# Patient Record
Sex: FEMALE | Race: WHITE | NOT HISPANIC OR LATINO | Employment: PART TIME | URBAN - METROPOLITAN AREA
[De-identification: names, ages, dates, MRNs, and addresses within clinical notes are randomized per-mention and may not be internally consistent; named-entity substitution may affect disease eponyms.]

---

## 2017-04-07 ENCOUNTER — OFFICE VISIT (OUTPATIENT)
Dept: URGENT CARE | Facility: URGENT CARE | Age: 57
End: 2017-04-07
Payer: COMMERCIAL

## 2017-04-07 VITALS
DIASTOLIC BLOOD PRESSURE: 94 MMHG | TEMPERATURE: 98.3 F | BODY MASS INDEX: 39.47 KG/M2 | OXYGEN SATURATION: 97 % | SYSTOLIC BLOOD PRESSURE: 138 MMHG | HEART RATE: 97 BPM | WEIGHT: 252 LBS

## 2017-04-07 DIAGNOSIS — J02.9 SORE THROAT: Primary | ICD-10-CM

## 2017-04-07 LAB
DEPRECATED S PYO AG THROAT QL EIA: NORMAL
FLUAV+FLUBV AG SPEC QL: NEGATIVE
FLUAV+FLUBV AG SPEC QL: NORMAL
MICRO REPORT STATUS: NORMAL
SPECIMEN SOURCE: NORMAL
SPECIMEN SOURCE: NORMAL

## 2017-04-07 PROCEDURE — 87081 CULTURE SCREEN ONLY: CPT | Performed by: FAMILY MEDICINE

## 2017-04-07 PROCEDURE — 87880 STREP A ASSAY W/OPTIC: CPT | Performed by: FAMILY MEDICINE

## 2017-04-07 PROCEDURE — 87804 INFLUENZA ASSAY W/OPTIC: CPT | Performed by: FAMILY MEDICINE

## 2017-04-07 PROCEDURE — 99203 OFFICE O/P NEW LOW 30 MIN: CPT | Performed by: PHYSICIAN ASSISTANT

## 2017-04-07 NOTE — MR AVS SNAPSHOT
After Visit Summary   4/7/2017    Maylin Ni    MRN: [id number]           Patient Information     Date Of Birth          1960        Visit Information        Provider Department      4/7/2017 7:20 PM Zachary Huerta PA-C [facility]        Today's Diagnoses     Sore throat    -  1      Care Instructions      Viral Upper Respiratory Illness (Adult)  You have a viral upper respiratory illness (URI), which is another term for the common cold. This illness is contagious during the first few days. It is spread through the air by coughing and sneezing. It may also be spread by direct contact (touching the sick person and then touching your own eyes, nose, or mouth). Frequent handwashing will decrease risk of spread. Most viral illnesses go away within 7 to 10 days with rest and simple home remedies. Sometimes the illness may last for several weeks. Antibiotics will not kill a virus, and they are generally not prescribed for this condition.    Home care    If symptoms are severe, rest at home for the first 2 to 3 days. When you resume activity, don't let yourself get too tired.    Avoid being exposed to cigarette smoke (yours or others ).    You may use acetaminophen or ibuprofen to control pain and fever, unless another medicine was prescribed. (Note: If you have chronic liver or kidney disease, have ever had a stomach ulcer or gastrointestinal bleeding, or are taking blood-thinning medicines, talk with your healthcare provider before using these medicines.) Aspirin should never be given to anyone under 18 years of age who is ill with a viral infection or fever. It may cause severe liver or brain damage.    Your appetite may be poor, so a light diet is fine. Avoid dehydration by drinking 6 to 8 glasses of fluids per day (water, soft drinks, juices, tea, or soup). Extra fluids will help loosen secretions in the nose and lungs.    Over-the-counter cold medicines will not  shorten the length of time you re sick, but they may be helpful for the following symptoms: cough, sore throat, and nasal and sinus congestion. (Note: Do not use decongestants if you have high blood pressure.)  Follow-up care  Follow up with your healthcare provider, or as advised.  When to seek medical advice  Call your healthcare provider right away if any of these occur:    Cough with lots of colored sputum (mucus)    Severe headache; face, neck, or ear pain    Difficulty swallowing due to throat pain    Fever of 100.4 F (38 C)  Call 911, or get immediate medical care  Call emergency services right away if any of these occur:    Chest pain, shortness of breath, wheezing, or difficulty breathing    Coughing up blood    Inability to swallow due to throat pain    2624-2243 [employer]. [address] [place] [address], [place] [id number]. All rights reserved. This information is not intended as a substitute for professional medical care. Always follow your healthcare professional's instructions.        Self-Care for Sore Throats  Sore throats occur for many reasons, such as colds, allergies, and infections caused by viruses or bacteria. In any case, your throat becomes red and sore. Your goal for self-care is to reduce your discomfort while giving your throat a chance to heal.    Moisten and Soothe Your Throat    Try a sip of water first thing after waking up.    Keep your throat moist by drinking 6 or more glasses of clear liquids every day.    Run a cool-air humidifier in your room overnight.    Avoid cigarette smoke.     Suck on throat lozenges, cough drops, hard candy, ice chips, or frozen fruit-juice bars. Use the sugar-free versions if your diet or medical condition require them.  Gargle to Ease Irritation  Gargling every hour or 2 can ease irritation. Try gargling with 1 of these solutions:    1/4 teaspoon of salt in 1/2 cup of warm water    An over-the-counter anesthetic gargle  Use Medication for More  Relief  Over-the-counter medication can reduce sore throat symptoms. Ask your pharmacist if you have questions about which medication to use:    Ease pain with anesthetic sprays. Aspirin or an aspirin substitute also helps. Remember, never give aspirin to anyone 18 or younger, or if you are already taking blood thinners.     For sore throats caused by allergies, try antihistamines to block the allergic reaction.    Remember: unless a sore throat is caused by a bacterial infection, antibiotics won t help you.  Prevent Future Sore Throats    Stop smoking or reduce contact with secondhand smoke. Smoke irritates the tender throat lining.    Limit contact with pets and with allergy-causing substances, such as pollen and mold.    When you re around someone with a sore throat or cold, wash your hands frequently to keep viruses or bacteria from spreading.    Don t strain your vocal cords.  Call Your Health Care Provider  Contact your doctor if you have:    A temperature over 101 F (38.3 C)    White spots on the throat    Great difficulty swallowing    Trouble breathing    A skin rash    Recent exposure to someone else with strep bacteria    Severe hoarseness and swollen glands in the neck or jaw     7061-6289 [employer]. [address] [place] [address]. All rights reserved. This information is not intended as a substitute for professional medical care. Always follow your healthcare professional's instructions.        Laryngitis    Laryngitis is a swelling of the tissues around the vocal cords. Symptoms include a hoarse (scratchy) voice. The voice may be lost completely. It may be caused by a viral illness, such as a head or chest cold. It may also be due to overuse and strain of the voice. Smoking, drinking alcohol, acid reflux, allergies, or inhaling harsh chemicals may also lead to symptoms. This condition will usually resolve in 1-2 weeks.  Home care    Rest your voice until it recovers. Talk as  little as possible. If your symptoms are severe, rest at home for a day or so.    Breathing cool steam from a humidifier/vaporizer or in a steamy shower may be helpful.    Drink plenty of fluids to stay well hydrated.    Do not smoke  Follow-up care  Follow up with your healthcare provider or this facility if you have not improved after one week.  When to seek medical advice  Contact your healthcare provider for any of the following:    Severe pain with swallowing    Trouble opening mouth    Neck swelling, neck pain, or trouble moving neck    Noisy breathing or trouble breathing    Fever of 100.4 F (38. C) or higher, or as directed by your healthcare provider    Drooling    Symptoms do not resolve in 2 weeks    7414-8905 [employer]. [address] [place] [address], [place] [address]. All rights reserved. This information is not intended as a substitute for professional medical care. Always follow your healthcare professional's instructions.              Follow-ups after your visit        Who to contact     If you have questions or need follow up information about today's clinic visit or your schedule please contact [facility] directly at [phone].  Normal or non-critical lab and imaging results will be communicated to you by [employer], letter or phone within 4 business days after the clinic has received the results. If you do not hear from us within 7 days, please contact the clinic through [employer] or phone. If you have a critical or abnormal lab result, we will notify you by phone as soon as possible.  Submit refill requests through [employer] or call your pharmacy and they will forward the refill request to us. Please allow 3 business days for your refill to be completed.          Additional Information About Your Visit        [employer] Information     [employer] lets you send messages to your doctor, view your test results, renew your prescriptions, schedule appointments and more. To sign up, go  "to [email or web address] . Click on \"Log in\" on the left side of the screen, which will take you to the Welcome page. Then click on \"Sign up Now\" on the right side of the page.     You will be asked to enter the access code listed below, as well as some personal information. Please follow the directions to create your username and password.     Your access code is: [email or web address]  Expires: 2017  8:16 PM     Your access code will  in 90 days. If you need help or a new code, please call your [place] clinic or [phone].        Care EveryWhere ID     This is your Care EveryWhere ID. This could be used by other organizations to access your [place] medical records  [id number]        Your Vitals Were     Pulse Temperature Pulse Oximetry BMI (Body Mass Index)          97 98.3  F (36.8  C) (Oral) 97% 39.47 kg/m2         Blood Pressure from Last 3 Encounters:   17 (!) 138/94   12 122/80    Weight from Last 3 Encounters:   17 252 lb (114.3 kg)   12 225 lb (102.1 kg)              We Performed the Following     Beta strep group A culture     Influenza A/B antigen     Strep, Rapid Screen        Primary Care Provider Office Phone # Fax #    Janeth Billingsley [phone]       [facility] [address] [place] [address] DR SARWAT GOMEZ [facility] [phone]        Thank you!     Thank you for choosing [facility]  for your care. Our goal is always to provide you with excellent care. Hearing back from our patients is one way we can continue to improve our services. Please take a few minutes to complete the written survey that you may receive in the mail after your visit with us. Thank you!             Your Updated Medication List - Protect others around you: Learn how to safely use, store and throw away your medicines at www.disposemymeds.org.          This list is accurate as of: 17  8:19 PM.  Always use your most recent med list.                   Brand Name Dispense " Instructions for use    IBUPROFEN          lisinopril 20 MG tablet    PRINIVIL/ZESTRIL     Take 20 mg by mouth daily.       lisinopril-hydrochlorothiazide 20-25 MG per tablet    PRINZIDE/ZESTORETIC     Take 1 tablet by mouth daily.

## 2017-04-08 NOTE — PROGRESS NOTES
SUBJECTIVE:   Maylin Ni is a 56 year old female presenting with a chief complaint of sore throat.  Onset of symptoms was 3 day(s) ago.  Course of illness is worsening.    Severity moderate  Current and Associated symptoms: rhinorrhea and cough   Treatment measures tried include None tried.  Predisposing factors include None.    No past medical history on file.  Current Outpatient Prescriptions   Medication Sig Dispense Refill     lisinopril-hydrochlorothiazide (PRINZIDE,ZESTORETIC) 20-25 MG per tablet Take 1 tablet by mouth daily.       lisinopril (PRINIVIL,ZESTRIL) 20 MG tablet Take 20 mg by mouth daily.       IBUPROFEN        Social History   Substance Use Topics     Smoking status: Never Smoker     Smokeless tobacco: Not on file     Alcohol use Not on file       ROS:  Review of systems negative except as stated above.    OBJECTIVE  :BP (!) 138/94  Pulse 97  Temp 98.3  F (36.8  C) (Oral)  Wt 252 lb (114.3 kg)  SpO2 97%  BMI 39.47 kg/m2  GENERAL APPEARANCE: healthy, alert and no distress  EYES: EOMI,  PERRL, conjunctiva clear  HENT: ear canals and TM's normal.  Nose and mouth without ulcers, erythema or lesions  NECK: supple, nontender, no lymphadenopathy  RESP: lungs clear to auscultation - no rales, rhonchi or wheezes  CV: regular rates and rhythm, normal S1 S2, no murmur noted    Results for orders placed or performed in visit on 04/07/17   Strep, Rapid Screen   Result Value Ref Range    Specimen Description Throat     Rapid Strep A Screen       NEGATIVE: No Group A streptococcal antigen detected by immunoassay, await   culture report.      Micro Report Status FINAL 04/07/2017    Influenza A/B antigen   Result Value Ref Range    Influenza A/B Agn Specimen Nasal     Influenza A Negative NEG    Influenza B  NEG     Negative   Test results must be correlated with clinical data. If necessary, results   should be confirmed by a molecular assay or viral culture.           ASSESSMENT:  (J02.9) Sore throat   (primary encounter diagnosis)  Comment: Likely viral URI, will follow up with worsening/persisting issues  Plan: Strep, Rapid Screen, Influenza A/B antigen,         Beta strep group A culture  Voice rest x2-3 days  Fluids, rest, supportive measures  Follow up with PCP if symptoms worsen or fail to improve

## 2017-04-08 NOTE — PATIENT INSTRUCTIONS
Viral Upper Respiratory Illness (Adult)  You have a viral upper respiratory illness (URI), which is another term for the common cold. This illness is contagious during the first few days. It is spread through the air by coughing and sneezing. It may also be spread by direct contact (touching the sick person and then touching your own eyes, nose, or mouth). Frequent handwashing will decrease risk of spread. Most viral illnesses go away within 7 to 10 days with rest and simple home remedies. Sometimes the illness may last for several weeks. Antibiotics will not kill a virus, and they are generally not prescribed for this condition.    Home care    If symptoms are severe, rest at home for the first 2 to 3 days. When you resume activity, don't let yourself get too tired.    Avoid being exposed to cigarette smoke (yours or others ).    You may use acetaminophen or ibuprofen to control pain and fever, unless another medicine was prescribed. (Note: If you have chronic liver or kidney disease, have ever had a stomach ulcer or gastrointestinal bleeding, or are taking blood-thinning medicines, talk with your healthcare provider before using these medicines.) Aspirin should never be given to anyone under 18 years of age who is ill with a viral infection or fever. It may cause severe liver or brain damage.    Your appetite may be poor, so a light diet is fine. Avoid dehydration by drinking 6 to 8 glasses of fluids per day (water, soft drinks, juices, tea, or soup). Extra fluids will help loosen secretions in the nose and lungs.    Over-the-counter cold medicines will not shorten the length of time you re sick, but they may be helpful for the following symptoms: cough, sore throat, and nasal and sinus congestion. (Note: Do not use decongestants if you have high blood pressure.)  Follow-up care  Follow up with your healthcare provider, or as advised.  When to seek medical advice  Call your healthcare provider right away if any  of these occur:    Cough with lots of colored sputum (mucus)    Severe headache; face, neck, or ear pain    Difficulty swallowing due to throat pain    Fever of 100.4 F (38 C)  Call 911, or get immediate medical care  Call emergency services right away if any of these occur:    Chest pain, shortness of breath, wheezing, or difficulty breathing    Coughing up blood    Inability to swallow due to throat pain    4502-7022 [employer]. [address] [place] [address], [place] [address]. All rights reserved. This information is not intended as a substitute for professional medical care. Always follow your healthcare professional's instructions.        Self-Care for Sore Throats  Sore throats occur for many reasons, such as colds, allergies, and infections caused by viruses or bacteria. In any case, your throat becomes red and sore. Your goal for self-care is to reduce your discomfort while giving your throat a chance to heal.    Moisten and Soothe Your Throat    Try a sip of water first thing after waking up.    Keep your throat moist by drinking 6 or more glasses of clear liquids every day.    Run a cool-air humidifier in your room overnight.    Avoid cigarette smoke.     Suck on throat lozenges, cough drops, hard candy, ice chips, or frozen fruit-juice bars. Use the sugar-free versions if your diet or medical condition require them.  Gargle to Ease Irritation  Gargling every hour or 2 can ease irritation. Try gargling with 1 of these solutions:    1/4 teaspoon of salt in 1/2 cup of warm water    An over-the-counter anesthetic gargle  Use Medication for More Relief  Over-the-counter medication can reduce sore throat symptoms. Ask your pharmacist if you have questions about which medication to use:    Ease pain with anesthetic sprays. Aspirin or an aspirin substitute also helps. Remember, never give aspirin to anyone 18 or younger, or if you are already taking blood thinners.     For sore throats caused by  allergies, try antihistamines to block the allergic reaction.    Remember: unless a sore throat is caused by a bacterial infection, antibiotics won t help you.  Prevent Future Sore Throats    Stop smoking or reduce contact with secondhand smoke. Smoke irritates the tender throat lining.    Limit contact with pets and with allergy-causing substances, such as pollen and mold.    When you re around someone with a sore throat or cold, wash your hands frequently to keep viruses or bacteria from spreading.    Don t strain your vocal cords.  Call Your Health Care Provider  Contact your doctor if you have:    A temperature over 101 F (38.3 C)    White spots on the throat    Great difficulty swallowing    Trouble breathing    A skin rash    Recent exposure to someone else with strep bacteria    Severe hoarseness and swollen glands in the neck or jaw     1147-4245 [employer]. [address] [place], [place] [address]. All rights reserved. This information is not intended as a substitute for professional medical care. Always follow your healthcare professional's instructions.        Laryngitis    Laryngitis is a swelling of the tissues around the vocal cords. Symptoms include a hoarse (scratchy) voice. The voice may be lost completely. It may be caused by a viral illness, such as a head or chest cold. It may also be due to overuse and strain of the voice. Smoking, drinking alcohol, acid reflux, allergies, or inhaling harsh chemicals may also lead to symptoms. This condition will usually resolve in 1-2 weeks.  Home care    Rest your voice until it recovers. Talk as little as possible. If your symptoms are severe, rest at home for a day or so.    Breathing cool steam from a humidifier/vaporizer or in a steamy shower may be helpful.    Drink plenty of fluids to stay well hydrated.    Do not smoke  Follow-up care  Follow up with your healthcare provider or this facility if you have not improved after one week.  When  to seek medical advice  Contact your healthcare provider for any of the following:    Severe pain with swallowing    Trouble opening mouth    Neck swelling, neck pain, or trouble moving neck    Noisy breathing or trouble breathing    Fever of 100.4 F (38. C) or higher, or as directed by your healthcare provider    Drooling    Symptoms do not resolve in 2 weeks    2198-1412 [employer]. [address] [place], [place] [address]. All rights reserved. This information is not intended as a substitute for professional medical care. Always follow your healthcare professional's instructions.

## 2017-04-08 NOTE — NURSING NOTE
"Chief Complaint   Patient presents with     Urgent Care     Pharyngitis     ST since Tuesday, cough/congestion X 2-3 days, settling into chest yesterday.     Initial BP (!) 138/94  Pulse 97  Temp 98.3  F (36.8  C) (Oral)  Wt 252 lb (114.3 kg)  SpO2 97%  BMI 39.47 kg/m2 Estimated body mass index is 39.47 kg/(m^2) as calculated from the following:    Height as of 5/29/12: 5' 7\" (1.702 m).    Weight as of this encounter: 252 lb (114.3 kg).  BP completed using cuff size  large    Trinity Guy/CMA    "

## 2017-04-09 LAB
BACTERIA SPEC CULT: NORMAL
MICRO REPORT STATUS: NORMAL
SPECIMEN SOURCE: NORMAL

## 2017-04-10 ENCOUNTER — OFFICE VISIT - HEALTHEAST (OUTPATIENT)
Dept: FAMILY MEDICINE | Facility: CLINIC | Age: 57
End: 2017-04-10

## 2017-04-10 DIAGNOSIS — J40 BRONCHITIS: ICD-10-CM

## 2017-04-10 DIAGNOSIS — I10 ESSENTIAL (PRIMARY) HYPERTENSION: ICD-10-CM

## 2017-04-10 LAB
CHOLEST SERPL-MCNC: 167 MG/DL
FASTING STATUS PATIENT QL REPORTED: YES
HDLC SERPL-MCNC: 52 MG/DL
LDLC SERPL CALC-MCNC: 100 MG/DL
TRIGL SERPL-MCNC: 75 MG/DL

## 2017-04-10 ASSESSMENT — MIFFLIN-ST. JEOR: SCORE: 1708.03

## 2017-05-23 ENCOUNTER — RECORDS - HEALTHEAST (OUTPATIENT)
Dept: ADMINISTRATIVE | Facility: OTHER | Age: 57
End: 2017-05-23

## 2017-11-05 ENCOUNTER — COMMUNICATION - HEALTHEAST (OUTPATIENT)
Dept: FAMILY MEDICINE | Facility: CLINIC | Age: 57
End: 2017-11-05

## 2017-11-05 DIAGNOSIS — I10 ESSENTIAL (PRIMARY) HYPERTENSION: ICD-10-CM

## 2017-12-29 ENCOUNTER — HOSPITAL ENCOUNTER (OUTPATIENT)
Dept: MAMMOGRAPHY | Facility: CLINIC | Age: 57
Discharge: HOME | End: 2017-12-29
Attending: FAMILY MEDICINE

## 2017-12-29 DIAGNOSIS — Z12.31 VISIT FOR SCREENING MAMMOGRAM: ICD-10-CM

## 2018-01-16 ENCOUNTER — OFFICE VISIT - HEALTHEAST (OUTPATIENT)
Dept: FAMILY MEDICINE | Facility: CLINIC | Age: 58
End: 2018-01-16

## 2018-01-16 DIAGNOSIS — I10 ESSENTIAL (PRIMARY) HYPERTENSION: ICD-10-CM

## 2018-01-16 DIAGNOSIS — Z00.00 ROUTINE GENERAL MEDICAL EXAMINATION AT A HEALTH CARE FACILITY: ICD-10-CM

## 2018-01-16 LAB
ALBUMIN SERPL-MCNC: 4 G/DL (ref 3.5–5)
ALBUMIN UR-MCNC: NEGATIVE MG/DL
ALP SERPL-CCNC: 68 U/L (ref 45–120)
ALT SERPL W P-5'-P-CCNC: 25 U/L (ref 0–45)
ANION GAP SERPL CALCULATED.3IONS-SCNC: 9 MMOL/L (ref 5–18)
APPEARANCE UR: CLEAR
AST SERPL W P-5'-P-CCNC: 22 U/L (ref 0–40)
BILIRUB SERPL-MCNC: 0.8 MG/DL (ref 0–1)
BILIRUB UR QL STRIP: NEGATIVE
BUN SERPL-MCNC: 12 MG/DL (ref 8–22)
CALCIUM SERPL-MCNC: 9.7 MG/DL (ref 8.5–10.5)
CHLORIDE BLD-SCNC: 102 MMOL/L (ref 98–107)
CHOLEST SERPL-MCNC: 171 MG/DL
CO2 SERPL-SCNC: 27 MMOL/L (ref 22–31)
COLOR UR AUTO: YELLOW
CREAT SERPL-MCNC: 0.7 MG/DL (ref 0.6–1.1)
FASTING STATUS PATIENT QL REPORTED: YES
GFR SERPL CREATININE-BSD FRML MDRD: >60 ML/MIN/1.73M2
GLUCOSE BLD-MCNC: 92 MG/DL (ref 70–125)
GLUCOSE UR STRIP-MCNC: NEGATIVE MG/DL
HDLC SERPL-MCNC: 58 MG/DL
HGB BLD-MCNC: 13.7 G/DL (ref 12–16)
HGB UR QL STRIP: ABNORMAL
KETONES UR STRIP-MCNC: NEGATIVE MG/DL
LDLC SERPL CALC-MCNC: 96 MG/DL
LEUKOCYTE ESTERASE UR QL STRIP: NEGATIVE
NITRATE UR QL: NEGATIVE
PH UR STRIP: 6.5 [PH] (ref 5–8)
POTASSIUM BLD-SCNC: 3.7 MMOL/L (ref 3.5–5)
PROT SERPL-MCNC: 6.9 G/DL (ref 6–8)
SODIUM SERPL-SCNC: 138 MMOL/L (ref 136–145)
SP GR UR STRIP: 1.01 (ref 1–1.03)
TRIGL SERPL-MCNC: 86 MG/DL
UROBILINOGEN UR STRIP-ACNC: ABNORMAL

## 2018-01-16 ASSESSMENT — MIFFLIN-ST. JEOR: SCORE: 1752.48

## 2018-07-27 ENCOUNTER — COMMUNICATION - HEALTHEAST (OUTPATIENT)
Dept: FAMILY MEDICINE | Facility: CLINIC | Age: 58
End: 2018-07-27

## 2018-07-27 DIAGNOSIS — I10 ESSENTIAL (PRIMARY) HYPERTENSION: ICD-10-CM

## 2018-10-06 ENCOUNTER — OFFICE VISIT (OUTPATIENT)
Dept: URGENT CARE | Facility: URGENT CARE | Age: 58
End: 2018-10-06
Payer: COMMERCIAL

## 2018-10-06 VITALS
DIASTOLIC BLOOD PRESSURE: 84 MMHG | WEIGHT: 256 LBS | HEART RATE: 104 BPM | SYSTOLIC BLOOD PRESSURE: 144 MMHG | BODY MASS INDEX: 40.1 KG/M2 | TEMPERATURE: 99.5 F | OXYGEN SATURATION: 97 % | RESPIRATION RATE: 12 BRPM

## 2018-10-06 DIAGNOSIS — J02.9 ACUTE PHARYNGITIS, UNSPECIFIED ETIOLOGY: ICD-10-CM

## 2018-10-06 DIAGNOSIS — J22 LOWER RESP. TRACT INFECTION: Primary | ICD-10-CM

## 2018-10-06 LAB
DEPRECATED S PYO AG THROAT QL EIA: NORMAL
SPECIMEN SOURCE: NORMAL

## 2018-10-06 PROCEDURE — 87081 CULTURE SCREEN ONLY: CPT | Performed by: PHYSICIAN ASSISTANT

## 2018-10-06 PROCEDURE — 87880 STREP A ASSAY W/OPTIC: CPT | Performed by: PHYSICIAN ASSISTANT

## 2018-10-06 PROCEDURE — 99213 OFFICE O/P EST LOW 20 MIN: CPT | Performed by: PHYSICIAN ASSISTANT

## 2018-10-06 RX ORDER — PREDNISONE 20 MG/1
40 TABLET ORAL DAILY
Qty: 10 TABLET | Refills: 0 | Status: SHIPPED | OUTPATIENT
Start: 2018-10-06 | End: 2018-10-11

## 2018-10-06 RX ORDER — ALBUTEROL SULFATE 90 UG/1
2 AEROSOL, METERED RESPIRATORY (INHALATION) EVERY 4 HOURS PRN
Qty: 1 INHALER | Refills: 0 | Status: SHIPPED | OUTPATIENT
Start: 2018-10-06 | End: 2022-05-31

## 2018-10-06 NOTE — PROGRESS NOTES
SUBJECTIVE:   Maylin Ni is a 58 year old female presenting with a chief complaint of ST, cough and cold sx that more in chest now.  Feels congested.  Coughing up green mucous.  Developed fever last night up to 101.4  Denies SOB or chest pain.  ST worse today and worried about strep  Onset of symptoms was 4 week(s) ago on and off but getting worse  Course of illness is worsening.    Severity moderate  Current and Associated symptoms: negative other than stated above  Treatment measures tried include Tylenol/Ibuprofen, Fluids and Rest.  Predisposing factors include None.    Generally healthy and no underlying medical issues    No past medical history on file.  Current Outpatient Prescriptions   Medication Sig Dispense Refill     albuterol (PROAIR HFA/PROVENTIL HFA/VENTOLIN HFA) 108 (90 Base) MCG/ACT inhaler Inhale 2 puffs into the lungs every 4 hours as needed for shortness of breath / dyspnea 1 Inhaler 0     amoxicillin-clavulanate (AUGMENTIN) 875-125 MG per tablet Take 1 tablet by mouth 2 times daily 20 tablet 0     IBUPROFEN        lisinopril (PRINIVIL,ZESTRIL) 20 MG tablet Take 20 mg by mouth daily.       lisinopril-hydrochlorothiazide (PRINZIDE,ZESTORETIC) 20-25 MG per tablet Take 1 tablet by mouth daily.       predniSONE (DELTASONE) 20 MG tablet Take 2 tablets (40 mg) by mouth daily for 5 days 10 tablet 0     Social History   Substance Use Topics     Smoking status: Never Smoker     Smokeless tobacco: Not on file     Alcohol use Not on file       ROS:  Review of systems negative except as stated above.    OBJECTIVE:  /84  Pulse 104  Temp 99.5  F (37.5  C) (Oral)  Resp 12  Wt 256 lb (116.1 kg)  SpO2 97%  BMI 40.1 kg/m2  GENERAL APPEARANCE: healthy, alert and no distress  EYES: EOMI,  PERRL, conjunctiva clear  HENT: TM's normal bilaterally, nasal turbinates erythematous, swollen, rhinorrhea yellow and green, oral mucous membranes moist, no erythema noted and thick green discharge  NECK: supple,  nontender, no lymphadenopathy  RESP: rhonchi throughout   CV: regular rates and rhythm, normal S1 S2, no murmur noted  NEURO: Normal strength and tone, sensory exam grossly normal,  normal speech and mentation  SKIN: no suspicious lesions or rashes    Results for orders placed or performed in visit on 10/06/18   Strep, Rapid Screen   Result Value Ref Range    Specimen Description Throat     Rapid Strep A Screen       NEGATIVE: No Group A streptococcal antigen detected by immunoassay, await culture report.   Beta strep group A culture   Result Value Ref Range    Specimen Description Throat     Culture Micro No beta hemolytic Streptococcus Group A isolated          assessment/plan:  (J22) Lower resp. tract infection  (primary encounter diagnosis)  Comment:   Plan: amoxicillin-clavulanate (AUGMENTIN) 875-125 MG         per tablet, predniSONE (DELTASONE) 20 MG         tablet, albuterol (PROAIR HFA/PROVENTIL         HFA/VENTOLIN HFA) 108 (90 Base) MCG/ACT inhaler          Med as directed and OTC med for sx relief.  Due to length of sx, exam and hx will treat.  Hold x-ray for now. Follow-up with PCP as needed if sx worsen and red flag signs discussed    (J02.9) Acute pharyngitis, unspecified etiology  Comment:   Plan: Strep, Rapid Screen, Beta strep group A culture          As above

## 2018-10-06 NOTE — NURSING NOTE
"Chief Complaint   Patient presents with     Urgent Care     Fever     Pt has been sick off and on over the 2-3 weeks.  Last night she developed a fever and wasnt feeling well.  This morning fever has increased and coughing, painful to swallow and throat feels full.  Coughing up green mucous.      Initial /84  Pulse 104  Temp 99.5  F (37.5  C) (Oral)  Resp 12  Wt 256 lb (116.1 kg)  SpO2 97%  BMI 40.1 kg/m2 Estimated body mass index is 40.1 kg/(m^2) as calculated from the following:    Height as of 5/29/12: 5' 7\" (1.702 m).    Weight as of this encounter: 256 lb (116.1 kg)..  BP completed using cuff size: rosalba Albarran R.N.    "

## 2018-10-07 LAB
BACTERIA SPEC CULT: NORMAL
SPECIMEN SOURCE: NORMAL

## 2018-10-13 ENCOUNTER — HEALTH MAINTENANCE LETTER (OUTPATIENT)
Age: 58
End: 2018-10-13

## 2018-12-11 ENCOUNTER — RECORDS - HEALTHEAST (OUTPATIENT)
Dept: ADMINISTRATIVE | Facility: OTHER | Age: 58
End: 2018-12-11

## 2019-01-11 ENCOUNTER — HOSPITAL ENCOUNTER (OUTPATIENT)
Dept: MAMMOGRAPHY | Facility: CLINIC | Age: 59
Discharge: HOME | End: 2019-01-11
Attending: FAMILY MEDICINE

## 2019-01-11 DIAGNOSIS — Z12.31 VISIT FOR SCREENING MAMMOGRAM: ICD-10-CM

## 2019-01-16 ENCOUNTER — COMMUNICATION - HEALTHEAST (OUTPATIENT)
Dept: FAMILY MEDICINE | Facility: CLINIC | Age: 59
End: 2019-01-16

## 2019-01-16 DIAGNOSIS — I10 ESSENTIAL (PRIMARY) HYPERTENSION: ICD-10-CM

## 2019-01-29 ENCOUNTER — OFFICE VISIT - HEALTHEAST (OUTPATIENT)
Dept: FAMILY MEDICINE | Facility: CLINIC | Age: 59
End: 2019-01-29

## 2019-01-29 DIAGNOSIS — Z00.00 ROUTINE GENERAL MEDICAL EXAMINATION AT A HEALTH CARE FACILITY: ICD-10-CM

## 2019-01-29 DIAGNOSIS — I10 ESSENTIAL (PRIMARY) HYPERTENSION: ICD-10-CM

## 2019-01-29 LAB
ALBUMIN SERPL-MCNC: 4.3 G/DL (ref 3.5–5)
ALBUMIN UR-MCNC: NEGATIVE MG/DL
ALP SERPL-CCNC: 70 U/L (ref 45–120)
ALT SERPL W P-5'-P-CCNC: 24 U/L (ref 0–45)
ANION GAP SERPL CALCULATED.3IONS-SCNC: 11 MMOL/L (ref 5–18)
APPEARANCE UR: CLEAR
AST SERPL W P-5'-P-CCNC: 24 U/L (ref 0–40)
BILIRUB SERPL-MCNC: 0.7 MG/DL (ref 0–1)
BILIRUB UR QL STRIP: NEGATIVE
BUN SERPL-MCNC: 9 MG/DL (ref 8–22)
CALCIUM SERPL-MCNC: 10 MG/DL (ref 8.5–10.5)
CHLORIDE BLD-SCNC: 101 MMOL/L (ref 98–107)
CHOLEST SERPL-MCNC: 164 MG/DL
CO2 SERPL-SCNC: 27 MMOL/L (ref 22–31)
COLOR UR AUTO: YELLOW
CREAT SERPL-MCNC: 0.73 MG/DL (ref 0.6–1.1)
FASTING STATUS PATIENT QL REPORTED: YES
GFR SERPL CREATININE-BSD FRML MDRD: >60 ML/MIN/1.73M2
GLUCOSE BLD-MCNC: 94 MG/DL (ref 70–125)
GLUCOSE UR STRIP-MCNC: NEGATIVE MG/DL
HDLC SERPL-MCNC: 54 MG/DL
HGB BLD-MCNC: 13.8 G/DL (ref 12–16)
HGB UR QL STRIP: ABNORMAL
KETONES UR STRIP-MCNC: NEGATIVE MG/DL
LDLC SERPL CALC-MCNC: 93 MG/DL
LEUKOCYTE ESTERASE UR QL STRIP: NEGATIVE
NITRATE UR QL: NEGATIVE
PH UR STRIP: 7.5 [PH] (ref 5–8)
POTASSIUM BLD-SCNC: 4 MMOL/L (ref 3.5–5)
PROT SERPL-MCNC: 7.1 G/DL (ref 6–8)
SODIUM SERPL-SCNC: 139 MMOL/L (ref 136–145)
SP GR UR STRIP: 1.01 (ref 1–1.03)
TRIGL SERPL-MCNC: 86 MG/DL
UROBILINOGEN UR STRIP-ACNC: ABNORMAL

## 2019-01-30 LAB
HPV SOURCE: NORMAL
HUMAN PAPILLOMA VIRUS 16 DNA: NEGATIVE
HUMAN PAPILLOMA VIRUS 18 DNA: NEGATIVE
HUMAN PAPILLOMA VIRUS FINAL DIAGNOSIS: NORMAL
HUMAN PAPILLOMA VIRUS OTHER HR: NEGATIVE
SPECIMEN DESCRIPTION: NORMAL

## 2019-02-06 LAB
BKR LAB AP ABNORMAL BLEEDING: NO
BKR LAB AP BIRTH CONTROL/HORMONES: NORMAL
BKR LAB AP CERVICAL APPEARANCE: NORMAL
BKR LAB AP GYN ADEQUACY: NORMAL
BKR LAB AP GYN INTERPRETATION: NORMAL
BKR LAB AP HPV REFLEX: NORMAL
BKR LAB AP LMP: NORMAL
BKR LAB AP PATIENT STATUS: NORMAL
BKR LAB AP PREVIOUS ABNORMAL: NO
BKR LAB AP PREVIOUS NORMAL: NORMAL
HIGH RISK?: NO
PATH REPORT.COMMENTS IMP SPEC: NORMAL
RESULT FLAG (HE HISTORICAL CONVERSION): NORMAL

## 2019-05-01 ENCOUNTER — COMMUNICATION - HEALTHEAST (OUTPATIENT)
Dept: FAMILY MEDICINE | Facility: CLINIC | Age: 59
End: 2019-05-01

## 2019-05-01 DIAGNOSIS — I10 ESSENTIAL (PRIMARY) HYPERTENSION: ICD-10-CM

## 2019-05-28 ENCOUNTER — COMMUNICATION - HEALTHEAST (OUTPATIENT)
Dept: FAMILY MEDICINE | Facility: CLINIC | Age: 59
End: 2019-05-28

## 2019-07-01 ENCOUNTER — COMMUNICATION - HEALTHEAST (OUTPATIENT)
Dept: FAMILY MEDICINE | Facility: CLINIC | Age: 59
End: 2019-07-01

## 2019-07-01 DIAGNOSIS — J01.90 ACUTE NON-RECURRENT SINUSITIS, UNSPECIFIED LOCATION: ICD-10-CM

## 2019-07-18 ENCOUNTER — RECORDS - HEALTHEAST (OUTPATIENT)
Dept: ADMINISTRATIVE | Facility: OTHER | Age: 59
End: 2019-07-18

## 2019-07-18 ENCOUNTER — COMMUNICATION - HEALTHEAST (OUTPATIENT)
Dept: FAMILY MEDICINE | Facility: CLINIC | Age: 59
End: 2019-07-18

## 2019-07-22 ENCOUNTER — OFFICE VISIT - HEALTHEAST (OUTPATIENT)
Dept: FAMILY MEDICINE | Facility: CLINIC | Age: 59
End: 2019-07-22

## 2019-07-22 DIAGNOSIS — M25.531 RIGHT WRIST PAIN: ICD-10-CM

## 2019-07-22 DIAGNOSIS — R82.90 ABNORMAL URINALYSIS: ICD-10-CM

## 2019-07-22 DIAGNOSIS — I10 ESSENTIAL (PRIMARY) HYPERTENSION: ICD-10-CM

## 2019-07-22 DIAGNOSIS — Z01.818 PREOPERATIVE EXAMINATION: ICD-10-CM

## 2019-07-22 LAB
ALBUMIN SERPL-MCNC: 4.2 G/DL (ref 3.5–5)
ALBUMIN UR-MCNC: NEGATIVE MG/DL
ALP SERPL-CCNC: 76 U/L (ref 45–120)
ALT SERPL W P-5'-P-CCNC: 28 U/L (ref 0–45)
ANION GAP SERPL CALCULATED.3IONS-SCNC: 10 MMOL/L (ref 5–18)
APPEARANCE UR: CLEAR
AST SERPL W P-5'-P-CCNC: 23 U/L (ref 0–40)
ATRIAL RATE - MUSE: 71 BPM
BACTERIA #/AREA URNS HPF: ABNORMAL HPF
BILIRUB SERPL-MCNC: 0.6 MG/DL (ref 0–1)
BILIRUB UR QL STRIP: NEGATIVE
BUN SERPL-MCNC: 11 MG/DL (ref 8–22)
CALCIUM SERPL-MCNC: 9.8 MG/DL (ref 8.5–10.5)
CHLORIDE BLD-SCNC: 102 MMOL/L (ref 98–107)
CHOLEST SERPL-MCNC: 165 MG/DL
CO2 SERPL-SCNC: 27 MMOL/L (ref 22–31)
COLOR UR AUTO: YELLOW
CREAT SERPL-MCNC: 0.67 MG/DL (ref 0.6–1.1)
DIASTOLIC BLOOD PRESSURE - MUSE: NORMAL MMHG
FASTING STATUS PATIENT QL REPORTED: YES
GFR SERPL CREATININE-BSD FRML MDRD: >60 ML/MIN/1.73M2
GLUCOSE BLD-MCNC: 84 MG/DL (ref 70–125)
GLUCOSE UR STRIP-MCNC: NEGATIVE MG/DL
HDLC SERPL-MCNC: 59 MG/DL
HGB BLD-MCNC: 13.5 G/DL (ref 12–16)
HGB UR QL STRIP: ABNORMAL
INTERPRETATION ECG - MUSE: NORMAL
KETONES UR STRIP-MCNC: NEGATIVE MG/DL
LDLC SERPL CALC-MCNC: 95 MG/DL
LEUKOCYTE ESTERASE UR QL STRIP: ABNORMAL
NITRATE UR QL: NEGATIVE
P AXIS - MUSE: 4 DEGREES
PH UR STRIP: 6.5 [PH] (ref 5–8)
POTASSIUM BLD-SCNC: 3.5 MMOL/L (ref 3.5–5)
PR INTERVAL - MUSE: 140 MS
PROT SERPL-MCNC: 6.6 G/DL (ref 6–8)
QRS DURATION - MUSE: 84 MS
QT - MUSE: 400 MS
QTC - MUSE: 434 MS
R AXIS - MUSE: -10 DEGREES
RBC #/AREA URNS AUTO: ABNORMAL HPF
SODIUM SERPL-SCNC: 139 MMOL/L (ref 136–145)
SP GR UR STRIP: 1.02 (ref 1–1.03)
SQUAMOUS #/AREA URNS AUTO: ABNORMAL LPF
SYSTOLIC BLOOD PRESSURE - MUSE: NORMAL MMHG
T AXIS - MUSE: 4 DEGREES
TRIGL SERPL-MCNC: 57 MG/DL
UROBILINOGEN UR STRIP-ACNC: ABNORMAL
VENTRICULAR RATE- MUSE: 71 BPM
WBC #/AREA URNS AUTO: ABNORMAL HPF

## 2019-07-22 ASSESSMENT — MIFFLIN-ST. JEOR: SCORE: 1579.66

## 2019-07-23 LAB — BACTERIA SPEC CULT: NO GROWTH

## 2019-08-08 ENCOUNTER — RECORDS - HEALTHEAST (OUTPATIENT)
Dept: ADMINISTRATIVE | Facility: OTHER | Age: 59
End: 2019-08-08

## 2019-09-10 ENCOUNTER — RECORDS - HEALTHEAST (OUTPATIENT)
Dept: ADMINISTRATIVE | Facility: OTHER | Age: 59
End: 2019-09-10

## 2019-12-16 ENCOUNTER — HEALTH MAINTENANCE LETTER (OUTPATIENT)
Age: 59
End: 2019-12-16

## 2020-01-06 ENCOUNTER — COMMUNICATION - HEALTHEAST (OUTPATIENT)
Dept: FAMILY MEDICINE | Facility: CLINIC | Age: 60
End: 2020-01-06

## 2020-01-06 DIAGNOSIS — I10 ESSENTIAL (PRIMARY) HYPERTENSION: ICD-10-CM

## 2020-01-31 ENCOUNTER — OFFICE VISIT - HEALTHEAST (OUTPATIENT)
Dept: FAMILY MEDICINE | Facility: CLINIC | Age: 60
End: 2020-01-31

## 2020-01-31 DIAGNOSIS — Z00.00 ROUTINE GENERAL MEDICAL EXAMINATION AT A HEALTH CARE FACILITY: ICD-10-CM

## 2020-01-31 DIAGNOSIS — I10 ESSENTIAL (PRIMARY) HYPERTENSION: ICD-10-CM

## 2020-01-31 DIAGNOSIS — Z12.11 SCREEN FOR COLON CANCER: ICD-10-CM

## 2020-01-31 LAB
ALBUMIN SERPL-MCNC: 4.2 G/DL (ref 3.5–5)
ALBUMIN UR-MCNC: NEGATIVE MG/DL
ALP SERPL-CCNC: 79 U/L (ref 45–120)
ALT SERPL W P-5'-P-CCNC: 19 U/L (ref 0–45)
ANION GAP SERPL CALCULATED.3IONS-SCNC: 14 MMOL/L (ref 5–18)
APPEARANCE UR: CLEAR
AST SERPL W P-5'-P-CCNC: 20 U/L (ref 0–40)
BILIRUB SERPL-MCNC: 0.6 MG/DL (ref 0–1)
BILIRUB UR QL STRIP: NEGATIVE
BUN SERPL-MCNC: 10 MG/DL (ref 8–22)
CALCIUM SERPL-MCNC: 9.7 MG/DL (ref 8.5–10.5)
CHLORIDE BLD-SCNC: 100 MMOL/L (ref 98–107)
CHOLEST SERPL-MCNC: 186 MG/DL
CO2 SERPL-SCNC: 24 MMOL/L (ref 22–31)
COLOR UR AUTO: YELLOW
CREAT SERPL-MCNC: 0.65 MG/DL (ref 0.6–1.1)
FASTING STATUS PATIENT QL REPORTED: NORMAL
GFR SERPL CREATININE-BSD FRML MDRD: >60 ML/MIN/1.73M2
GLUCOSE BLD-MCNC: 77 MG/DL (ref 70–125)
GLUCOSE UR STRIP-MCNC: NEGATIVE MG/DL
HDLC SERPL-MCNC: 66 MG/DL
HGB BLD-MCNC: 14.1 G/DL (ref 12–16)
HGB UR QL STRIP: NEGATIVE
KETONES UR STRIP-MCNC: NEGATIVE MG/DL
LDLC SERPL CALC-MCNC: 108 MG/DL
LEUKOCYTE ESTERASE UR QL STRIP: NEGATIVE
NITRATE UR QL: NEGATIVE
PH UR STRIP: 7 [PH] (ref 5–8)
POTASSIUM BLD-SCNC: 3.9 MMOL/L (ref 3.5–5)
PROT SERPL-MCNC: 6.8 G/DL (ref 6–8)
SODIUM SERPL-SCNC: 138 MMOL/L (ref 136–145)
SP GR UR STRIP: 1.02 (ref 1–1.03)
TRIGL SERPL-MCNC: 60 MG/DL
UROBILINOGEN UR STRIP-ACNC: NORMAL

## 2020-01-31 ASSESSMENT — MIFFLIN-ST. JEOR: SCORE: 1484.41

## 2020-02-17 ENCOUNTER — HOSPITAL ENCOUNTER (OUTPATIENT)
Dept: MAMMOGRAPHY | Facility: CLINIC | Age: 60
Discharge: HOME | End: 2020-02-17
Attending: FAMILY MEDICINE

## 2020-02-17 DIAGNOSIS — Z12.31 VISIT FOR SCREENING MAMMOGRAM: ICD-10-CM

## 2020-06-25 ENCOUNTER — OFFICE VISIT (OUTPATIENT)
Dept: OPTOMETRY | Facility: CLINIC | Age: 60
End: 2020-06-25
Payer: COMMERCIAL

## 2020-06-25 ENCOUNTER — RECORDS - HEALTHEAST (OUTPATIENT)
Dept: ADMINISTRATIVE | Facility: OTHER | Age: 60
End: 2020-06-25

## 2020-06-25 DIAGNOSIS — Z01.00 ENCOUNTER FOR EXAMINATION OF EYES AND VISION WITHOUT ABNORMAL FINDINGS: Primary | ICD-10-CM

## 2020-06-25 DIAGNOSIS — H52.223 REGULAR ASTIGMATISM OF BOTH EYES: ICD-10-CM

## 2020-06-25 DIAGNOSIS — H52.13 MYOPIA OF BOTH EYES: ICD-10-CM

## 2020-06-25 DIAGNOSIS — H52.4 PRESBYOPIA: ICD-10-CM

## 2020-06-25 PROCEDURE — 92004 COMPRE OPH EXAM NEW PT 1/>: CPT | Performed by: OPTOMETRIST

## 2020-06-25 PROCEDURE — 92015 DETERMINE REFRACTIVE STATE: CPT | Performed by: OPTOMETRIST

## 2020-06-25 ASSESSMENT — TONOMETRY
OS_IOP_MMHG: 16
OD_IOP_MMHG: 15
IOP_METHOD: APPLANATION

## 2020-06-25 ASSESSMENT — VISUAL ACUITY
OS_CC: 20/20
METHOD: SNELLEN - LINEAR
OD_CC: 20/20
OS_CC+: -2
CORRECTION_TYPE: GLASSES
OD_SC: 20/150
OD_CC: 20/30-1
OS_CC: 20/25
OD_CC+: -1
OS_SC: 20/100

## 2020-06-25 ASSESSMENT — REFRACTION_MANIFEST
OD_SPHERE: -1.00
OS_AXIS: 003
OS_CYLINDER: +0.50
OD_CYLINDER: SPHERE
OS_AXIS: 001
OD_ADD: +2.25
OS_SPHERE: -1.00
OS_SPHERE: -1.25
OS_ADD: +2.25
OD_CYLINDER: +0.50
OD_SPHERE: -1.25
METHOD_AUTOREFRACTION: 1
OD_AXIS: 180
OS_CYLINDER: +0.50

## 2020-06-25 ASSESSMENT — CONF VISUAL FIELD
OD_NORMAL: 1
OS_NORMAL: 1

## 2020-06-25 ASSESSMENT — EXTERNAL EXAM - RIGHT EYE: OD_EXAM: NORMAL

## 2020-06-25 ASSESSMENT — REFRACTION_WEARINGRX
OS_AXIS: 040
OD_SPHERE: -1.25
OD_ADD: +2.50
SPECS_TYPE: PAL
OS_ADD: +2.50
OS_CYLINDER: +0.25
OS_SPHERE: -1.25
OD_CYLINDER: SPHERE

## 2020-06-25 ASSESSMENT — SLIT LAMP EXAM - LIDS
COMMENTS: NORMAL
COMMENTS: NORMAL

## 2020-06-25 ASSESSMENT — EXTERNAL EXAM - LEFT EYE: OS_EXAM: NORMAL

## 2020-06-25 ASSESSMENT — CUP TO DISC RATIO
OS_RATIO: 0.25
OD_RATIO: 0.25

## 2020-06-25 NOTE — PATIENT INSTRUCTIONS
Maylin was advised of today's exam findings.  Optional to fill new glasses prescription, minimal change  Copy of glasses Rx provided today.    Return in 1-2 years for eye exam, or sooner if needed.    The effects of the dilating drops last for 4- 6 hours.  You will be more sensitive to light and vision will be blurry up close.  Mydriatic sunglasses were given if needed.    Leonard Chen O.D.  [facility]  [address] [place] [address] [place] [address] [phone]

## 2020-06-25 NOTE — PROGRESS NOTES
Chief Complaint   Patient presents with     Annual Eye Exam      Accompanied by self  Last Eye Exam: 3-  Dilated Previously: Yes    What are you currently using to see?  glasses       Distance Vision Acuity: Satisfied with vision    Near Vision Acuity: Not satisfied     Eye Comfort: good  Do you use eye drops? : No  Occupation or Hobbies: nurse manager at the     [employer]          Medical, surgical and family histories reviewed and updated 6/25/2020.       OBJECTIVE: See Ophthalmology exam    ASSESSMENT:    ICD-10-CM    1. Encounter for examination of eyes and vision without abnormal findings  Z01.00    2. Myopia of both eyes  H52.13    3. Regular astigmatism of both eyes  H52.223    4. Presbyopia  H52.4       PLAN:     Patient Instructions   Maylin was advised of today's exam findings.  Optional to fill new glasses prescription, minimal change  Copy of glasses Rx provided today.    Return in 1-2 years for eye exam, or sooner if needed.    The effects of the dilating drops last for 4- 6 hours.  You will be more sensitive to light and vision will be blurry up close.  Mydriatic sunglasses were given if needed.    Leonard Chen O.D.  [facility] [place]  [address] [address] [phone]

## 2020-06-25 NOTE — LETTER
6/25/2020         RE: Maylin Ni  [address] [place] [address]        Dear Colleague,    Thank you for referring your patient, Maylin Ni, to the [facility]. Please see a copy of my visit note below.    Chief Complaint   Patient presents with     Annual Eye Exam      Accompanied by self  Last Eye Exam: 3-  Dilated Previously: Yes    What are you currently using to see?  glasses       Distance Vision Acuity: Satisfied with vision    Near Vision Acuity: Not satisfied     Eye Comfort: good  Do you use eye drops? 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          Medical, surgical and family histories reviewed and updated 6/25/2020.       OBJECTIVE: See Ophthalmology exam    ASSESSMENT:    ICD-10-CM    1. Encounter for examination of eyes and vision without abnormal findings  Z01.00    2. Myopia of both eyes  H52.13    3. Regular astigmatism of both eyes  H52.223    4. Presbyopia  H52.4       PLAN:     Patient Instructions   Maylin was advised of today's exam findings.  Optional to fill new glasses prescription, minimal change  Copy of glasses Rx provided today.    Return in 1-2 years for eye exam, or sooner if needed.    The effects of the dilating drops last for 4- 6 hours.  You will be more sensitive to light and vision will be blurry up close.  Mydriatic sunglasses were given if needed.    Leonard Chen O.D.  [address] [place], [place] [id number] [phone]           Again, thank you for allowing me to participate in the care of your patient.        Sincerely,        Leonard Chen, OD

## 2020-07-31 ENCOUNTER — RECORDS - HEALTHEAST (OUTPATIENT)
Dept: ADMINISTRATIVE | Facility: OTHER | Age: 60
End: 2020-07-31

## 2020-08-10 ENCOUNTER — OFFICE VISIT - HEALTHEAST (OUTPATIENT)
Dept: FAMILY MEDICINE | Facility: CLINIC | Age: 60
End: 2020-08-10

## 2020-08-10 DIAGNOSIS — I10 ESSENTIAL (PRIMARY) HYPERTENSION: ICD-10-CM

## 2020-08-12 ENCOUNTER — COMMUNICATION - HEALTHEAST (OUTPATIENT)
Dept: FAMILY MEDICINE | Facility: CLINIC | Age: 60
End: 2020-08-12

## 2020-08-14 ENCOUNTER — AMBULATORY - HEALTHEAST (OUTPATIENT)
Dept: LAB | Facility: CLINIC | Age: 60
End: 2020-08-14

## 2020-08-14 ENCOUNTER — AMBULATORY - HEALTHEAST (OUTPATIENT)
Dept: NURSING | Facility: CLINIC | Age: 60
End: 2020-08-14

## 2020-08-14 DIAGNOSIS — I10 ESSENTIAL (PRIMARY) HYPERTENSION: ICD-10-CM

## 2020-08-14 LAB
ALBUMIN SERPL-MCNC: 3.9 G/DL (ref 3.5–5)
ALP SERPL-CCNC: 76 U/L (ref 45–120)
ALT SERPL W P-5'-P-CCNC: 19 U/L (ref 0–45)
ANION GAP SERPL CALCULATED.3IONS-SCNC: 8 MMOL/L (ref 5–18)
AST SERPL W P-5'-P-CCNC: 21 U/L (ref 0–40)
BILIRUB SERPL-MCNC: 0.6 MG/DL (ref 0–1)
BUN SERPL-MCNC: 14 MG/DL (ref 8–22)
CALCIUM SERPL-MCNC: 9.3 MG/DL (ref 8.5–10.5)
CHLORIDE BLD-SCNC: 102 MMOL/L (ref 98–107)
CHOLEST SERPL-MCNC: 183 MG/DL
CO2 SERPL-SCNC: 27 MMOL/L (ref 22–31)
CREAT SERPL-MCNC: 0.68 MG/DL (ref 0.6–1.1)
FASTING STATUS PATIENT QL REPORTED: YES
GFR SERPL CREATININE-BSD FRML MDRD: >60 ML/MIN/1.73M2
GLUCOSE BLD-MCNC: 88 MG/DL (ref 70–125)
HDLC SERPL-MCNC: 65 MG/DL
LDLC SERPL CALC-MCNC: 108 MG/DL
POTASSIUM BLD-SCNC: 3.8 MMOL/L (ref 3.5–5)
PROT SERPL-MCNC: 6.4 G/DL (ref 6–8)
SODIUM SERPL-SCNC: 137 MMOL/L (ref 136–145)
TRIGL SERPL-MCNC: 52 MG/DL

## 2020-10-04 ENCOUNTER — COMMUNICATION - HEALTHEAST (OUTPATIENT)
Dept: FAMILY MEDICINE | Facility: CLINIC | Age: 60
End: 2020-10-04

## 2021-03-10 ENCOUNTER — COMMUNICATION - HEALTHEAST (OUTPATIENT)
Dept: FAMILY MEDICINE | Facility: CLINIC | Age: 61
End: 2021-03-10

## 2021-03-24 ENCOUNTER — RECORDS - HEALTHEAST (OUTPATIENT)
Dept: FAMILY MEDICINE | Facility: CLINIC | Age: 61
End: 2021-03-24

## 2021-03-24 DIAGNOSIS — Z12.31 VISIT FOR SCREENING MAMMOGRAM: ICD-10-CM

## 2021-04-15 ENCOUNTER — DOCUMENTATION ONLY (OUTPATIENT)
Dept: LAB | Facility: CLINIC | Age: 61
End: 2021-04-15

## 2021-04-15 DIAGNOSIS — Z13.220 LIPID SCREENING: Primary | ICD-10-CM

## 2021-04-15 DIAGNOSIS — I10 ESSENTIAL HYPERTENSION: ICD-10-CM

## 2021-04-15 NOTE — PROGRESS NOTES
"Patient has lab only appt 4/23/21 for \"fasting labs\", no orders in chart.  She has a Px appt with you on 4/27/21.  Please place FUTURE orders in Epic if appropriate.    Thanks,   lab      ALSO:      Maylin Ni has an upcoming lab appointment:    Future Appointments   Date Time Provider Department Center   4/23/2021  8:20 AM EA LAB EALAB    4/23/2021  8:30 AM EAMA1 EAMAMM    4/27/2021  1:50 PM [facility]      Please review Health Maintenance and sign order: Review of Health Maintenance Protocol Orders (HMPO) to authorize patient's due Health Maintenance labs to be drawn.    Health Maintenance Due   Topic     ANNUAL REVIEW OF HM ORDERS      HIV SCREENING      HEPATITIS C SCREENING      LIPID      Yenifer Ramirez, MLT    "

## 2021-04-23 ENCOUNTER — ANCILLARY PROCEDURE (OUTPATIENT)
Dept: MAMMOGRAPHY | Facility: CLINIC | Age: 61
End: 2021-04-23
Payer: COMMERCIAL

## 2021-04-23 DIAGNOSIS — Z12.31 VISIT FOR SCREENING MAMMOGRAM: ICD-10-CM

## 2021-04-23 DIAGNOSIS — Z13.220 LIPID SCREENING: ICD-10-CM

## 2021-04-23 DIAGNOSIS — Z11.4 SCREENING FOR HIV (HUMAN IMMUNODEFICIENCY VIRUS): ICD-10-CM

## 2021-04-23 DIAGNOSIS — Z11.59 NEED FOR HEPATITIS C SCREENING TEST: ICD-10-CM

## 2021-04-23 DIAGNOSIS — I10 ESSENTIAL HYPERTENSION: ICD-10-CM

## 2021-04-23 LAB
ALBUMIN SERPL-MCNC: 3.8 G/DL (ref 3.4–5)
ALP SERPL-CCNC: 69 U/L (ref 40–150)
ALT SERPL W P-5'-P-CCNC: 33 U/L (ref 0–50)
ANION GAP SERPL CALCULATED.3IONS-SCNC: 6 MMOL/L (ref 3–14)
AST SERPL W P-5'-P-CCNC: 22 U/L (ref 0–45)
BILIRUB SERPL-MCNC: 0.6 MG/DL (ref 0.2–1.3)
BUN SERPL-MCNC: 13 MG/DL (ref 7–30)
CALCIUM SERPL-MCNC: 9.2 MG/DL (ref 8.5–10.1)
CHLORIDE SERPL-SCNC: 104 MMOL/L (ref 94–109)
CHOLEST SERPL-MCNC: 191 MG/DL
CO2 SERPL-SCNC: 28 MMOL/L (ref 20–32)
CREAT SERPL-MCNC: 0.67 MG/DL (ref 0.52–1.04)
GFR SERPL CREATININE-BSD FRML MDRD: >90 ML/MIN/{1.73_M2}
GLUCOSE SERPL-MCNC: 80 MG/DL (ref 70–99)
HCV AB SERPL QL IA: NONREACTIVE
HDLC SERPL-MCNC: 73 MG/DL
HIV 1+2 AB+HIV1 P24 AG SERPL QL IA: NONREACTIVE
LDLC SERPL CALC-MCNC: 103 MG/DL
NONHDLC SERPL-MCNC: 119 MG/DL
POTASSIUM SERPL-SCNC: 3.7 MMOL/L (ref 3.4–5.3)
PROT SERPL-MCNC: 7 G/DL (ref 6.8–8.8)
SODIUM SERPL-SCNC: 138 MMOL/L (ref 133–144)
TRIGL SERPL-MCNC: 77 MG/DL

## 2021-04-23 PROCEDURE — 86803 HEPATITIS C AB TEST: CPT | Performed by: FAMILY MEDICINE

## 2021-04-23 PROCEDURE — 36415 COLL VENOUS BLD VENIPUNCTURE: CPT | Performed by: FAMILY MEDICINE

## 2021-04-23 PROCEDURE — 80053 COMPREHEN METABOLIC PANEL: CPT | Performed by: FAMILY MEDICINE

## 2021-04-23 PROCEDURE — 80061 LIPID PANEL: CPT | Performed by: FAMILY MEDICINE

## 2021-04-23 PROCEDURE — 87389 HIV-1 AG W/HIV-1&-2 AB AG IA: CPT | Performed by: FAMILY MEDICINE

## 2021-04-23 PROCEDURE — 77067 SCR MAMMO BI INCL CAD: CPT | Mod: TC | Performed by: RADIOLOGY

## 2021-04-26 ASSESSMENT — ENCOUNTER SYMPTOMS
NAUSEA: 0
ABDOMINAL PAIN: 0
NERVOUS/ANXIOUS: 0
DYSURIA: 0
HEMATURIA: 0
CONSTIPATION: 0
MYALGIAS: 0
WEAKNESS: 0
CHILLS: 0
BREAST MASS: 0
COUGH: 0
ARTHRALGIAS: 0
DIARRHEA: 0
HEMATOCHEZIA: 0
PARESTHESIAS: 0
HEARTBURN: 0
SHORTNESS OF BREATH: 0
PALPITATIONS: 0
HEADACHES: 0
JOINT SWELLING: 0
FEVER: 0
SORE THROAT: 0
EYE PAIN: 0
FREQUENCY: 0
DIZZINESS: 0

## 2021-04-27 ENCOUNTER — OFFICE VISIT (OUTPATIENT)
Dept: PEDIATRICS | Facility: CLINIC | Age: 61
End: 2021-04-27
Payer: COMMERCIAL

## 2021-04-27 VITALS
SYSTOLIC BLOOD PRESSURE: 138 MMHG | DIASTOLIC BLOOD PRESSURE: 82 MMHG | HEART RATE: 62 BPM | WEIGHT: 225 LBS | BODY MASS INDEX: 36.16 KG/M2 | HEIGHT: 66 IN | RESPIRATION RATE: 18 BRPM | OXYGEN SATURATION: 97 % | TEMPERATURE: 98.2 F

## 2021-04-27 DIAGNOSIS — J45.20 MILD INTERMITTENT ASTHMA WITHOUT COMPLICATION: ICD-10-CM

## 2021-04-27 DIAGNOSIS — I10 ESSENTIAL HYPERTENSION: ICD-10-CM

## 2021-04-27 DIAGNOSIS — E66.01 MORBID OBESITY (H): ICD-10-CM

## 2021-04-27 DIAGNOSIS — Z00.00 ROUTINE GENERAL MEDICAL EXAMINATION AT A HEALTH CARE FACILITY: Primary | ICD-10-CM

## 2021-04-27 PROCEDURE — 99214 OFFICE O/P EST MOD 30 MIN: CPT | Mod: 25 | Performed by: INTERNAL MEDICINE

## 2021-04-27 PROCEDURE — 99396 PREV VISIT EST AGE 40-64: CPT | Performed by: INTERNAL MEDICINE

## 2021-04-27 RX ORDER — LISINOPRIL AND HYDROCHLOROTHIAZIDE 12.5; 2 MG/1; MG/1
2 TABLET ORAL DAILY
Qty: 180 TABLET | Refills: 3 | Status: SHIPPED | OUTPATIENT
Start: 2021-04-27 | End: 2022-04-28

## 2021-04-27 RX ORDER — LISINOPRIL AND HYDROCHLOROTHIAZIDE 12.5; 2 MG/1; MG/1
2 TABLET ORAL DAILY
Qty: 90 TABLET | Refills: 3 | Status: SHIPPED | OUTPATIENT
Start: 2021-04-27 | End: 2021-04-27

## 2021-04-27 ASSESSMENT — ENCOUNTER SYMPTOMS
PARESTHESIAS: 0
HEMATOCHEZIA: 0
DIARRHEA: 0
DYSURIA: 0
HEMATURIA: 0
FEVER: 0
NAUSEA: 0
WEAKNESS: 0
CHILLS: 0
BREAST MASS: 0
FREQUENCY: 0
PALPITATIONS: 0
EYE PAIN: 0
ARTHRALGIAS: 0
SORE THROAT: 0
NERVOUS/ANXIOUS: 0
HEARTBURN: 0
SHORTNESS OF BREATH: 0
ABDOMINAL PAIN: 0
DIZZINESS: 0
HEADACHES: 0
JOINT SWELLING: 0
MYALGIAS: 0
CONSTIPATION: 0
COUGH: 0

## 2021-04-27 ASSESSMENT — MIFFLIN-ST. JEOR: SCORE: 1594.4

## 2021-04-27 NOTE — PROGRESS NOTES
SUBJECTIVE:   CC: Maylin Ni is an 60 year old woman who presents for preventive health visit.     Patient has been advised of split billing requirements and indicates understanding: Yes     Healthy Habits:     Getting at least 3 servings of Calcium per day:  Yes    Bi-annual eye exam:  Yes    Dental care twice a year:  Yes    Sleep apnea or symptoms of sleep apnea:  None    Diet:  Regular (no restrictions)    Frequency of exercise:  6-7 days/week    Duration of exercise:  45-60 minutes    Taking medications regularly:  Yes    Medication side effects:  None    PHQ-2 Total Score: 0    Additional concerns today:  No    Last pap 1/29/2019 negative HPV - due every 5 years  Colonoscopy 7/31/2020 - Q5 years    The 10-year ASCVD risk score (Janessa MOYER JrLeila, et al., 2013) is: 4.6%    Values used to calculate the score:      Age: 60 years      Sex: Female      Is Non- : No      Diabetic: No      Tobacco smoker: No      Systolic Blood Pressure: 138 mmHg      Is BP treated: Yes      HDL Cholesterol: 73 mg/dL      Total Cholesterol: 191 mg/dL      Today's PHQ-2 Score:   PHQ-2 ( 1999 Pfizer) 4/26/2021   Q1: Little interest or pleasure in doing things 0   Q2: Feeling down, depressed or hopeless 0   PHQ-2 Score 0   Q1: Little interest or pleasure in doing things Not at all   Q2: Feeling down, depressed or hopeless Not at all   PHQ-2 Score 0     Abuse: Current or Past (Physical, Sexual or Emotional) - No  Do you feel safe in your environment? Yes    Have you ever done Advance Care Planning? (For example, a Health Directive, POLST, or a discussion with a medical provider or your loved ones about your wishes): No, advance care planning information given to patient to review.  Advanced care planning was discussed at today's visit.    Social History     Tobacco Use     Smoking status: Never Smoker     Smokeless tobacco: Never Used   Substance Use Topics     Alcohol use: Yes     If you drink alcohol do you  typically have >3 drinks per day or >7 drinks per week? No    Alcohol Use 4/27/2021   Prescreen: >3 drinks/day or >7 drinks/week? -   Prescreen: >3 drinks/day or >7 drinks/week? No     Reviewed orders with patient.  Reviewed health maintenance and updated orders accordingly - Yes      Breast Cancer Screening:    FSH-7:   Breast CA Risk Assessment (FHS-7) 4/26/2021   Did any of your first-degree relatives have breast or ovarian cancer? No   Did any of your relatives have bilateral breast cancer? Yes   Did any man in your family have breast cancer? No   Did any woman in your family have breast and ovarian cancer? No   Did any woman in your family have breast cancer before age 50 y? No   Do you have 2 or more relatives with breast and/or ovarian cancer? No   Do you have 2 or more relatives with breast and/or bowel cancer? Yes     Mammogram Screening: Recommended mammography every 1-2 years with patient discussion and risk factor consideration  Pertinent mammograms are reviewed under the imaging tab.    History of abnormal Pap smear: NO - age 30-65 PAP every 5 years with negative HPV co-testing recommended     Reviewed and updated as needed this visit by clinical staff  Tobacco  Allergies               Reviewed and updated as needed this visit by Provider                    Review of Systems   Constitutional: Negative for chills and fever.   HENT: Negative for congestion, ear pain, hearing loss and sore throat.    Eyes: Negative for pain and visual disturbance.   Respiratory: Negative for cough and shortness of breath.    Cardiovascular: Negative for chest pain, palpitations and peripheral edema.   Gastrointestinal: Negative for abdominal pain, constipation, diarrhea, heartburn, hematochezia and nausea.   Breasts:  Negative for tenderness, breast mass and discharge.   Genitourinary: Negative for dysuria, frequency, genital sores, hematuria, pelvic pain, urgency, vaginal bleeding and vaginal discharge.  "  Musculoskeletal: Negative for arthralgias, joint swelling and myalgias.   Skin: Negative for rash.   Neurological: Negative for dizziness, weakness, headaches and paresthesias.   Psychiatric/Behavioral: Negative for mood changes. The patient is not nervous/anxious.           OBJECTIVE:   /82 (BP Location: Right arm, Patient Position: Sitting, Cuff Size: Adult Regular)   Pulse 62   Temp 98.2  F (36.8  C) (Tympanic)   Resp 18   Ht 1.664 m (5' 5.5\")   Wt 102.1 kg (225 lb)   SpO2 97%   BMI 36.87 kg/m    Physical Exam  GENERAL: healthy, alert and no distress  EYES: Eyes grossly normal to inspection, PERRL and conjunctivae and sclerae normal  HENT: ear canals and TM's normal, nose and mouth without ulcers or lesions  NECK: no adenopathy, no asymmetry, masses, or scars and thyroid normal to palpation  RESP: lungs clear to auscultation - no rales, rhonchi or wheezes  CV: regular rate and rhythm, normal S1 S2, no S3 or S4, no murmur, click or rub, no peripheral edema and peripheral pulses strong  ABDOMEN: soft, nontender, no hepatosplenomegaly, no masses and bowel sounds normal  MS: no gross musculoskeletal defects noted, no edema  SKIN: no suspicious lesions or rashes  NEURO: Normal strength and tone, mentation intact and speech normal  PSYCH: mentation appears normal, affect normal/bright    Diagnostic Test Results:  Labs reviewed in Epic    ASSESSMENT/PLAN:   1. Routine general medical examination at a health care facility  61 yo here for annual preventive health physical.  Reviewed healthy BP and BMI ranges.  Counseled on lifestyle modifications for optimal mental and physical health.  Discussed age-appropriate health maintanence.  Additional concerns addressed.      2. Essential hypertension  - Stable, no side effects with medications, refilled meds today  - lisinopril-hydrochlorothiazide (ZESTORETIC) 20-12.5 MG tablet; Take 2 tablets by mouth daily  Dispense: 180 tablet; Refill: 3    3. Mild intermittent " "asthma without complication  - Stable, no side effects with medications, refilled meds today        Patient has been advised of split billing requirements and indicates understanding: No  COUNSELING:  Reviewed preventive health counseling, as reflected in patient instructions    Estimated body mass index is 36.87 kg/m  as calculated from the following:    Height as of this encounter: 1.664 m (5' 5.5\").    Weight as of this encounter: 102.1 kg (225 lb).    Weight management plan: Discussed healthy diet and exercise guidelines    She reports that she has never smoked. She has never used smokeless tobacco.      Counseling Resources:  ATP IV Guidelines  Pooled Cohorts Equation Calculator  Breast Cancer Risk Calculator  BRCA-Related Cancer Risk Assessment: FHS-7 Tool  FRAX Risk Assessment  ICSI Preventive Guidelines  Dietary Guidelines for Americans, 2010  USDA's MyPlate  ASA Prophylaxis  Lung CA Screening    Liang Medina MD  [facility]  "

## 2021-05-27 VITALS
RESPIRATION RATE: 18 BRPM | SYSTOLIC BLOOD PRESSURE: 132 MMHG | HEART RATE: 84 BPM | DIASTOLIC BLOOD PRESSURE: 84 MMHG | OXYGEN SATURATION: 98 %

## 2021-05-28 ASSESSMENT — ASTHMA QUESTIONNAIRES: ACT_TOTALSCORE: 25

## 2021-05-28 NOTE — TELEPHONE ENCOUNTER
Refill Approved    Rx renewed per Medication Renewal Policy. Medication was last renewed on 1/29/19.    Gris Vidal, [facility] Triage/Med Refill 5/2/2019     Requested Prescriptions   Pending Prescriptions Disp Refills     lisinopril-hydrochlorothiazide (PRINZIDE,ZESTORETIC) 20-25 mg per tablet [Pharmacy Med Name: LISINOPRIL-HCTZ 20/25MG TABLETS] 90 tablet 0     Sig: TAKE 1 TABLET BY MOUTH EVERY MORNING       Diuretics/Combination Diuretics Refill Protocol  Passed - 5/1/2019  3:50 PM        Passed - Visit with PCP or prescribing provider visit in past 12 months     Last office visit with prescriber/PCP: 4/10/2017 Janeth Billingsley MD OR same dept: Visit date not found OR same specialty: 4/10/2017 Janeth Billingsley MD  Last physical: 1/29/2019 Last MTM visit: Visit date not found   Next visit within 3 mo: Visit date not found  Next physical within 3 mo: Visit date not found  Prescriber OR PCP: Janeth Billingsley MD  Last diagnosis associated with med order: 1. Essential (primary) hypertension  - lisinopril-hydrochlorothiazide (PRINZIDE,ZESTORETIC) 20-25 mg per tablet [Pharmacy Med Name: LISINOPRIL-HCTZ 20/25MG TABLETS]; TAKE 1 TABLET BY MOUTH EVERY MORNING  Dispense: 90 tablet; Refill: 0  - lisinopril (PRINIVIL,ZESTRIL) 20 MG tablet [Pharmacy Med Name: LISINOPRIL 20MG TABLETS]; TAKE 1 TABLET(20 MG) BY MOUTH DAILY  Dispense: 90 tablet; Refill: 0    If protocol passes may refill for 12 months if within 3 months of last provider visit (or a total of 15 months).             Passed - Serum Potassium in past 12 months      Lab Results   Component Value Date    Potassium 4.0 01/29/2019             Passed - Serum Sodium in past 12 months      Lab Results   Component Value Date    Sodium 139 01/29/2019             Passed - Blood pressure on file in past 12 months     BP Readings from Last 1 Encounters:   01/29/19 132/73             Passed - Serum Creatinine in past 12 months      Creatinine   Date Value Ref  Range Status   01/29/2019 0.73 0.60 - 1.10 mg/dL Final             lisinopril (PRINIVIL,ZESTRIL) 20 MG tablet [Pharmacy Med Name: LISINOPRIL 20MG TABLETS] 90 tablet 0     Sig: TAKE 1 TABLET(20 MG) BY MOUTH DAILY       Ace Inhibitors Refill Protocol Passed - 5/1/2019  3:50 PM        Passed - PCP or prescribing provider visit in past 12 months       Last office visit with prescriber/PCP: 4/10/2017 Janeth Billingsley MD OR same dept: Visit date not found OR same specialty: 4/10/2017 Janeth Billingsley MD  Last physical: 1/29/2019 Last MTM visit: Visit date not found   Next visit within 3 mo: Visit date not found  Next physical within 3 mo: Visit date not found  Prescriber OR PCP: Janeth Billingsley MD  Last diagnosis associated with med order: 1. Essential (primary) hypertension  - lisinopril-hydrochlorothiazide (PRINZIDE,ZESTORETIC) 20-25 mg per tablet [Pharmacy Med Name: LISINOPRIL-HCTZ 20/25MG TABLETS]; TAKE 1 TABLET BY MOUTH EVERY MORNING  Dispense: 90 tablet; Refill: 0  - lisinopril (PRINIVIL,ZESTRIL) 20 MG tablet [Pharmacy Med Name: LISINOPRIL 20MG TABLETS]; TAKE 1 TABLET(20 MG) BY MOUTH DAILY  Dispense: 90 tablet; Refill: 0    If protocol passes may refill for 12 months if within 3 months of last provider visit (or a total of 15 months).             Passed - Serum Potassium in past 12 months     Lab Results   Component Value Date    Potassium 4.0 01/29/2019             Passed - Blood pressure filed in past 12 months     BP Readings from Last 1 Encounters:   01/29/19 132/73             Passed - Serum Creatinine in past 12 months     Creatinine   Date Value Ref Range Status   01/29/2019 0.73 0.60 - 1.10 mg/dL Final

## 2021-05-29 NOTE — TELEPHONE ENCOUNTER
Medication Question or Clarification  Who is calling: Pharmacy: Transfered to another pharmacy.  What medication are you calling about? (include dose and sig) lisinopril and Prinizide  Who prescribed the medication?: PCP  What is your question/concern?: Is the patient on both medications?  Per charting by PCP at annual physical patient is on both to manage hypertension.  No further questions.  Pharmacy: [facility]  Okay to leave a detailed message?: No call back needed.   Site CMT - Please call the pharmacy to obtain any additional needed information.

## 2021-05-30 VITALS — BODY MASS INDEX: 39.91 KG/M2 | WEIGHT: 248.3 LBS | HEIGHT: 66 IN

## 2021-05-30 NOTE — TELEPHONE ENCOUNTER
New Appointment Needed  What is the reason for the visit:  Pre op  Pre-Op Appt Request  When is the surgery? :  07/26/19  Where is the surgery?:   [facility]   Who is the surgeon? :     What type of surgery is being done?: right wrist   Provider Preference: Any available  How soon do you need to be seen?: as soon as she can get in   Waitlist offered?: No  Okay to leave a detailed message:  Yes

## 2021-05-30 NOTE — PROGRESS NOTES
Assessment/Plan:      Visit for Preoperative Exam.     Patient approved for surgery with general or local anesthesia. Postoperative pain to be managed by surgeon during post-operative [employer] timeframe, typically 30-60 days for major surgery, and less for others. Labs will be done as indicated. Above recommendations were reviewed with the patient. Copy of the pre-op was given to the patient to bring along on the day of surgery. Follow up as needed. Proceed with proposed surgery without additional clinical clarifications.     Assessment: Maylin Ni is a 59 y.o. female who presents for pre-op history and physical. 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       Plan:    She appears to be medically optimized for the planned procedure.   . Labs were done as indicated below.  Recommendations were reviewed with the patient. Copy of the pre-op was given to the patient to bring along on the day of surgery.   Patient does have history of hypertension which is well controlled with lisinopril and lisinopril/hydrochlorothiazide.     The patient is approved for surgery and is considered to be low anesthetic risk.   Follow up as needed.    Please page me at [phone] if you have any questions or concerns regarding the care of this patient.     Patient is due for follow-up labs and follow-up visit regarding her hypertension.  She feels as if she is doing well on her blood pressure medication.  She is having no problems or complications from this.  Electrolyte panel as well as lipid panel were drawn today to follow-up on her medication.  She does not need a refill of her medications today but when she does she certainly will let me know.  She denies that she is been having any chest pain or shortness of breath or swelling of her extremities.  We should see her back in about 6 months for follow-up of her hypertension.    Subjective:     Scheduled Procedure:  Release of tendon, right wrist  Surgery Date:  7/29/19  Surgery Location:  [facility] [phone]  Surgeon:  Dr. Villaseñor    Current Outpatient Medications   Medication Sig Dispense Refill     lisinopril (PRINIVIL,ZESTRIL) 20 MG tablet TAKE 1 TABLET(20 MG) BY MOUTH DAILY 90 tablet 2     lisinopril-hydrochlorothiazide (PRINZIDE,ZESTORETIC) 20-25 mg per tablet TAKE 1 TABLET BY MOUTH EVERY MORNING 90 tablet 2     phentermine 30 MG capsule Take 30 mg by mouth every morning.       albuterol (PROVENTIL HFA;VENTOLIN HFA) 90 mcg/actuation inhaler Inhale 2 puffs every 6 (six) hours as needed for wheezing. 18 g 0     No current facility-administered medications for this visit.        Allergies   Allergen Reactions     Other Drug Allergy (See Comments)      Reacted to flu vaccine 10/2018       Immunization History   Administered Date(s) Administered     Influenza P3a1-22, 11/09/2009     Influenza, inj, historic,unspecified 10/23/2014, 10/22/2015, 10/26/2016, 10/06/2017     Influenza,O0B1-30,Pandemic,split,IM 12/04/2009     Influenza,seasonal, Inj IIV3 10/20/2008, 11/08/2009, 10/25/2010, 10/09/2012, 10/10/2013     Pneumo Polysac 23-V 07/04/2012     Td,adult,historic,unspecified 03/20/2006     Tdap 02/12/2010, 02/12/2010, 07/26/2013       Patient Active Problem List   Diagnosis     Adrenal Cortical Adenoma     Complex atypical endometrial hyperplasia     Endometriosis     SBO (small bowel obstruction) (H)     Airway hyperreactivity     Essential (primary) hypertension       Past Medical History:   Diagnosis Date     Endometriosis      Hypertension      Normal delivery     x3, 1986, 1988, 1991     Tubular adenoma 4/2015    found on colonoscopy, repeat in 5 yrs     Unspecified intestinal obstruction 10/2012    small bowel obstruction, thought to be secondary to raw carrots and apples       Social History     Socioeconomic History     Marital status:      Spouse name: Edgar     Number of children: 3     Years of education:  Not on file     Highest education level: Not on file   Occupational History     Occupation: RN Manager     Employer: [employer]     Comment: [place]   Social Needs     Financial resource strain: Not on file     Food insecurity:     Worry: Not on file     Inability: Not on file     Transportation needs:     Medical: Not on file     Non-medical: Not on file   Tobacco Use     Smoking status: Never Smoker     Smokeless tobacco: Never Used   Substance and Sexual Activity     Alcohol use: Yes     Alcohol/week: 0.5 oz     Types: 1 Standard drinks or equivalent per week     Frequency: 2-4 times a month     Drinks per session: 1 or 2     Binge frequency: Never     Drug use: No     Sexual activity: Yes     Partners: Male     Birth control/protection: Surgical     Comment: hysterectomy   Lifestyle     Physical activity:     Days per week: Not on file     Minutes per session: Not on file     Stress: Not on file   Relationships     Social connections:     Talks on phone: Not on file     Gets together: Not on file     Attends Yazidi service: Not on file     Active member of club or organization: Not on file     Attends meetings of clubs or organizations: Not on file     Relationship status: Not on file     Intimate partner violence:     Fear of current or ex partner: Not on file     Emotionally abused: Not on file     Physically abused: Not on file     Forced sexual activity: Not on file   Other Topics Concern     Not on file   Social History Narrative     Not on file       Past Surgical History:   Procedure Laterality Date     APPENDECTOMY  1980    with R ovary, fallopian tube surgery     CHOLECYSTECTOMY  1997    Laparoscopic     HYSTERECTOMY  2012    has left ovary remaining     oophorectomy Right 1980    R ovary and fallopian tube removed, endometriosis       HPI: Maylin is a 59 y.o. female here for a pre-operative consultation. The exam is requested by Dr. Milly Villaseñor in preparation for right wrist  "surgery to be performed at [facility]  on 7/29/19. Today s examination on 7/22/2019 is done to review the underlying surgical condition of chronic right wrist pain  as well as to clear for anesthesia and review her medical problems and make appropriate changes in medications. Patient has had right wrist pain, thought to be DeQuervian syndrome, for several years. She has had multiple injections into this wrist and is still having problems with pain. She now presents for surgical repair of the right wrist. She does have a history of hypertension which is well controlled with lisinopril.     History of Present Illness  Recent Health  Fever: no  Chills: no  Fatigue: no  Chest Pain: no  Cough: no  Dyspnea: no  Urinary Frequency: no  Nausea: no  Vomiting: no  Diarrhea: no  Abdominal Pain: no  Easy Bruising: no  Lower Extremity Swelling: no  Poor Exercise Tolerance: no      Pertinent History  Prior Anesthesia: yes  Previous Anesthesia Reaction:  no  Diabetes: no  Cardiovascular Disease: yes, history of hypertension, well controlled  Pulmonary Disease: no  Renal Disease: no  GI Disease: no  Sleep Apnea: no  Thromboembolic Problems: no  Clotting Disorder: no  Bleeding Disorder: no  Transfusion Reaction: no  Impaired Immunity: no  Steroid use in the last 6 months: no  Frequent Aspirin use: no    Family history of anesthesia reaction, Aneurysm, clotting disorder and bleeding disorder. Patient's father had stroke, MI and sudden cardiac death. Patient herself has not had problems.     Social history of patient does not wear denture or partial plates, there is no transfusion refusal and there are no concerns regarding care after surgery    After surgery, the patient plans to recover at home with family.            Objective:         Vitals:    07/22/19 0802   BP: 105/71   Pulse: 84   SpO2: 99%   Weight: 220 lb (99.8 kg)   Height: 5' 6\" (1.676 m)           Review of Systems:  Denies fever, chills, visual changes, " "fatigue, myalgias, nasal congestion, rhinorrhea, ear pain or discharge, sore throat, swollen glands, breast mass, nipple discharge, breast changes, abdominal pain, nausea, vomiting, diarrhea, constipation, cough, shortness of breath, chest pain, weight change, change in bowel habits, melena, rectal bleeding, dysuria, frequency, urgency, hematuria, polyuria, polydipsia, polyphagia, joint pain or swelling or erythema, edema, rash, weakness, paresthesias, vaginal discharge or bleeding or mood changes.  Remainder of review of systems was negative.    Positives: feeling well other than recurrent right wrist pain      Objective:   /71   Pulse 84   Ht 5' 6\" (1.676 m)   Wt 220 lb (99.8 kg)   LMP 09/16/2013   SpO2 99%   BMI 35.51 kg/m    Weight: 220 lb (99.8 kg)        Physical Exam:  General Appearance: Alert, cooperative, no distress, appears stated age  Head: Normocephalic, without obvious abnormality, atraumatic  Eyes: PERRL, conjunctiva/corneas clear, EOM's intact  Ears: Normal TM's and external ear canals, both ears  Nose: Nares normal, septum midline,mucosa normal, no drainage  Throat: Lips, mucosa, and tongue normal; teeth and gums normal  Neck: Supple, symmetrical, trachea midline, no adenopathy;  thyroid: not enlarged, symmetric, no tenderness/mass/nodules  Back: Symmetric, no curvature, ROM normal, no CVA tenderness  Lungs: Clear to auscultation bilaterally, respirations unlabored  Heart: Regular rate and rhythm, S1 and S2 normal, no murmur, rub, or gallop  Abdomen: Soft, non-tender, bowel sounds active all four quadrants,  no masses, no organomegaly  Extremities: Extremities normal, atraumatic, no cyanosis or edema  Skin: Skin color, texture, turgor normal, no rashes or lesions  Lymph nodes: Cervical and supraclavicular nodes normal  Neurologic: Normal         Recent Results (from the past 240 hour(s))   Electrocardiogram Perform and Read   Result Value Ref Range    SYSTOLIC BLOOD PRESSURE  mmHg    " DIASTOLIC BLOOD PRESSURE  mmHg    VENTRICULAR RATE 71 BPM    ATRIAL RATE 71 BPM    P-R INTERVAL 140 ms    QRS DURATION 84 ms    Q-T INTERVAL 400 ms    QTC CALCULATION (BEZET) 434 ms    P Axis 4 degrees    R AXIS -10 degrees    T AXIS 4 degrees    MUSE DIAGNOSIS       Normal sinus rhythm  Normal ECG  No previous ECGs available  Confirmed by TALA TREVINO MD LOC:[facility] ([id number]) on 7/22/2019 3:51:10 PM     Hemoglobin   Result Value Ref Range    Hemoglobin 13.5 12.0 - 16.0 g/dL   Comprehensive Metabolic Panel   Result Value Ref Range    Sodium 139 136 - 145 mmol/L    Potassium 3.5 3.5 - 5.0 mmol/L    Chloride 102 98 - 107 mmol/L    CO2 27 22 - 31 mmol/L    Anion Gap, Calculation 10 5 - 18 mmol/L    Glucose 84 70 - 125 mg/dL    BUN 11 8 - 22 mg/dL    Creatinine 0.67 0.60 - 1.10 mg/dL    GFR MDRD Af Amer >60 >60 mL/min/1.73m2    GFR MDRD Non Af Amer >60 >60 mL/min/1.73m2    Bilirubin, Total 0.6 0.0 - 1.0 mg/dL    Calcium 9.8 8.5 - 10.5 mg/dL    Protein, Total 6.6 6.0 - 8.0 g/dL    Albumin 4.2 3.5 - 5.0 g/dL    Alkaline Phosphatase 76 45 - 120 U/L    AST 23 0 - 40 U/L    ALT 28 0 - 45 U/L   Lipid Cascade   Result Value Ref Range    Cholesterol 165 <=199 mg/dL    Triglycerides 57 <=149 mg/dL    HDL Cholesterol 59 >=50 mg/dL    LDL Calculated 95 <=129 mg/dL    Patient Fasting > 8hrs? Yes    Urinalysis Macroscopic   Result Value Ref Range    Color, UA Yellow Colorless, Yellow, Straw, Light Yellow    Clarity, UA Clear Clear    Glucose, UA Negative Negative    Bilirubin, UA Negative Negative    Ketones, UA Negative Negative    Specific Gravity, UA 1.020 1.005 - 1.030    Blood, UA Trace (!) Negative    pH, UA 6.5 5.0 - 8.0    Protein, UA Negative Negative mg/dL    Urobilinogen, UA 0.2 E.U./dL 0.2 E.U./dL, 1.0 E.U./dL    Nitrite, UA Negative Negative    Leukocytes, UA Small (!) Negative   Urine,Microscopic   Result Value Ref Range    Bacteria, UA Few (!) None Seen hpf    RBC, UA 0-2 None Seen, 0-2 hpf    WBC, UA 0-5 None Seen, 0-5  hpf    Squam Epithel, UA 10-25 (!) None Seen, 0-5 lpf   Culture, Urine   Result Value Ref Range    Culture No Growth        Janeth Billingsley M.D.  [id number]  [place] [address]  Ph: [phone] Fax: [phone]  Pager [phone]

## 2021-05-31 VITALS — BODY MASS INDEX: 41.48 KG/M2 | HEIGHT: 66 IN | WEIGHT: 258.1 LBS

## 2021-06-02 VITALS — WEIGHT: 251 LBS | BODY MASS INDEX: 40.51 KG/M2

## 2021-06-03 VITALS — WEIGHT: 220 LBS | HEIGHT: 66 IN | BODY MASS INDEX: 35.36 KG/M2

## 2021-06-04 VITALS
HEART RATE: 75 BPM | BODY MASS INDEX: 31.98 KG/M2 | OXYGEN SATURATION: 99 % | SYSTOLIC BLOOD PRESSURE: 100 MMHG | HEIGHT: 66 IN | DIASTOLIC BLOOD PRESSURE: 70 MMHG | WEIGHT: 199 LBS

## 2021-06-05 NOTE — TELEPHONE ENCOUNTER
Refill Approved    Rx renewed per Medication Renewal Policy. Medication was last renewed on 05/02/2019    Zeenat Degroot, [facility] Triage/Med Refill 1/7/2020     Requested Prescriptions   Pending Prescriptions Disp Refills     lisinopril-hydrochlorothiazide (PRINZIDE,ZESTORETIC) 20-25 mg per tablet [Pharmacy Med Name: LISINOPRIL-HYDROCHLOROTH 20-25 TABS] 240 tablet 0     Sig: TAKE ONE TABLET BY MOUTH EVERY MORNING       Diuretics/Combination Diuretics Refill Protocol  Passed - 1/6/2020  9:53 AM        Passed - Visit with PCP or prescribing provider visit in past 12 months     Last office visit with prescriber/PCP: 4/10/2017 Janeht Billingsley MD OR same dept: Visit date not found OR same specialty: 4/10/2017 Janeth Billingsley MD  Last physical: 7/22/2019 Last MTM visit: Visit date not found   Next visit within 3 mo: Visit date not found  Next physical within 3 mo: Visit date not found  Prescriber OR PCP: Janeth Billingsley MD  Last diagnosis associated with med order: 1. Essential (primary) hypertension  - lisinopril-hydrochlorothiazide (PRINZIDE,ZESTORETIC) 20-25 mg per tablet [Pharmacy Med Name: LISINOPRIL-HYDROCHLOROTH 20-25 TABS]; TAKE ONE TABLET BY MOUTH EVERY MORNING  Dispense: 240 tablet; Refill: 0    If protocol passes may refill for 12 months if within 3 months of last provider visit (or a total of 15 months).             Passed - Serum Potassium in past 12 months      Lab Results   Component Value Date    Potassium 3.5 07/22/2019             Passed - Serum Sodium in past 12 months      Lab Results   Component Value Date    Sodium 139 07/22/2019             Passed - Blood pressure on file in past 12 months     BP Readings from Last 1 Encounters:   07/22/19 105/71             Passed - Serum Creatinine in past 12 months      Creatinine   Date Value Ref Range Status   07/22/2019 0.67 0.60 - 1.10 mg/dL Final

## 2021-06-05 NOTE — PROGRESS NOTES
Assessment:      Healthy female exam.      Plan:       All questions answered.  Await pap smear results.      ASSESSMENT: 59 y.o. female physical exam.     PLAN:     Routine general medical examination at a health care facility [Z00.00]    1. Routine general medical examination at a health care facility  - Hemoglobin  - Urinalysis Macroscopic  - Lipid Cascade    2. Essential (primary) hypertension  - Lipid Cascade  - Comprehensive Metabolic Panel  - lisinopril-hydrochlorothiazide (PRINZIDE,ZESTORETIC) 20-25 mg per tablet; Take 1 tablet by mouth every morning.  Dispense: 90 tablet; Refill: 1  - lisinopril (PRINIVIL,ZESTRIL) 20 MG tablet; Take 1 tablet (20 mg total) by mouth daily.  Dispense: 90 tablet; Refill: 1    3. Screen for colon cancer  - Ambulatory referral for Colonoscopy      Patient is a 59 y.o. female who is overall doing well.  She continues on lisinopril as well as lisinopril hydrochlorothiazide for blood pressure.  Those seem to be working well for her.  She is not having side effects to the medication.  Refill of both of those medications was sent to the pharmacy.  I also placed a referral for colonoscopy as she is due for colonoscopy in the upcoming months.  She does have a history of polyps in her colon and therefore needs a colonoscopy every 5 years.  She has been doing a weight loss program and has been very successful with that.  She is quite pleased to report that things are going well.  All of her questions and concerns were addressed today.  If she has additional problems or concerns should let me know.    Will contact her with the results of the labs when available.  Janeth Billingsley M.D.       Subjective:      Maylin Ni is a 59 y.o. female who presents for an annual exam. The patient is sexually active. The patient participates in regular exercise: yes. The patient reports that there is not domestic violence in her life.  She continues on lisinopril and hydrochlorothiazide for her  blood pressure.  She takes lisinopril hydrochlorothiazide in the morning and then an additional 20 mg lisinopril in the evening.  Her blood pressure today looks excellent at 100/70.  She is overall feeling well.  She has been on phentermine and has been losing weight and is quite pleased by that.  She plans to lose another 5 pounds or so and then she is hoping that she can stop the program.  She is tolerating her blood pressure occasions without problems.  She denies that she is having any chest pain or shortness of breath or problems with swelling.  She is due for colonoscopy.  Her mom had colon cancer and she has previously been found to have polyps in her colon and therefore is due to have a colonoscopy every 5 years.  Referral was placed for that today.  She does have mammogram scheduled already.  She does need a refill of her pressure medications which we will do today.  She has already gotten a flu vaccination.  She declines any kind of Pap or pelvic exam today.  She declines hepatitis C testing and HIV testing.  She will talk with her insurance regarding shingles vaccination.    Healthy Habits:   Regular Exercise: Yes  Sunscreen Use: Yes  Healthy Diet: Yes  Dental Visits Regularly: Yes  Seat Belt: Yes  Sexually active: Yes  Self Breast Exam Monthly:Yes  Hemoccults: N/A  Flex Sig: N/A  Colonoscopy: Yes  Lipid Profile: N/A  Glucose Screen: N/A  Prevention of Osteoporosis: N/A  Last Dexa: No  Guns at Home:  Yes  Guns Safety Locks:  Yes and Gun safe/class:  Yes      Immunization History   Administered Date(s) Administered     Influenza [id number], 11/09/2009     Influenza, inj, historic,unspecified 10/23/2014, 10/22/2015, 10/26/2016, 10/06/2017, 09/24/2019     Influenza,[id number],Pandemic,split,IM 12/04/2009     Influenza,seasonal, Inj IIV3 10/20/2008, 11/08/2009, 10/25/2010, 10/09/2012, 10/10/2013     Pneumo Polysac 23-V 07/04/2012     Td,adult,historic,unspecified 03/20/2006     Tdap 02/12/2010, 02/12/2010,  2013     Immunization status: up to date and documented, patient has had hepatitis B examinations.  She will check with her insurance in regards to shingles vaccination..    No exam data present    Gynecologic History  Patient's last menstrual period was 2013.  Contraception: none  Last Pap: . Results were: normal  Last mammogram: . Results were: normal.  Patient has upcoming mammogram already scheduled.      OB History    Para Term  AB Living   3 3 3 0 0 3   SAB TAB Ectopic Multiple Live Births   0 0 0 0 3      # Outcome Date GA Lbr Judd/2nd Weight Sex Delivery Anes PTL Lv   3 Term            2 Term            1 Term                Current Outpatient Medications   Medication Sig Dispense Refill     lisinopril (PRINIVIL,ZESTRIL) 20 MG tablet Take 1 tablet (20 mg total) by mouth daily. 90 tablet 1     lisinopril-hydrochlorothiazide (PRINZIDE,ZESTORETIC) 20-25 mg per tablet Take 1 tablet by mouth every morning. 90 tablet 1     phentermine 30 MG capsule Take 30 mg by mouth every morning.       albuterol (PROVENTIL HFA;VENTOLIN HFA) 90 mcg/actuation inhaler Inhale 2 puffs every 6 (six) hours as needed for wheezing. 18 g 0     No current facility-administered medications for this visit.      Past Medical History:   Diagnosis Date     Endometriosis      Hypertension      Normal delivery     x3, , ,      Tubular adenoma 2015    found on colonoscopy, repeat in 5 yrs     Unspecified intestinal obstruction 10/2012    small bowel obstruction, thought to be secondary to raw carrots and apples     Past Surgical History:   Procedure Laterality Date     APPENDECTOMY      with R ovary, fallopian tube surgery     CHOLECYSTECTOMY      Laparoscopic     HYSTERECTOMY  2012    has left ovary remaining     oophorectomy Right     R ovary and fallopian tube removed, endometriosis     right wrist 1st dorsal compartment release Right 2019    [facility]     Other drug allergy  (see comments)  Family History   Problem Relation Age of Onset     Colon cancer Mother         dx @ 64     Hypertension Mother      Osteopenia Mother      Lupus Mother      Heart disease Father         MI, sudden cardiac death     Hypertension Father      Stroke Father 59        carotid endarterectomy     Heart attack Father      Uterine cancer Sister         leiomyosarcoma     Breast cancer Paternal Grandmother      Social History     Socioeconomic History     Marital status:      Spouse name: Edgar     Number of children: 3     Years of education: Not on file     Highest education level: Not on file   Occupational History     Occupation: RN Manager     Employer: [employer]     Comment: [place]   [employer] Needs     Financial resource strain: Not on file     Food insecurity:     Worry: Not on file     Inability: Not on file     Transportation needs:     Medical: Not on file     Non-medical: Not on file   Tobacco Use     Smoking status: Never Smoker     Smokeless tobacco: Never Used   Substance and Sexual Activity     Alcohol use: Yes     Alcohol/week: 0.8 standard drinks     Types: 1 Standard drinks or equivalent per week     Frequency: 2-4 times a month     Drinks per session: 1 or 2     Binge frequency: Never     Drug use: No     Sexual activity: Yes     Partners: Male     Birth control/protection: Surgical     Comment: hysterectomy   Lifestyle     Physical activity:     Days per week: Not on file     Minutes per session: Not on file     Stress: Not on file   Relationships     Social connections:     Talks on phone: Not on file     Gets together: Not on file     Attends Judaism service: Not on file     Active member of club or organization: Not on file     Attends meetings of clubs or organizations: Not on file     Relationship status: Not on file     Intimate partner violence:     Fear of current or ex partner: Not on file     Emotionally abused: Not on file     Physically abused: Not on file  "    Forced sexual activity: Not on file   Other Topics Concern     Not on file   Social History Narrative     Not on file               Objective:         Vitals:    01/31/20 0958   BP: 100/70   Pulse: 75   SpO2: 99%   Weight: 199 lb (90.3 kg)   Height: 5' 6\" (1.676 m)     Body mass index is 32.12 kg/m .       REVIEW OF SYSTEMS:   Denies fever, chills, visual changes, fatigue, myalgias, nasal congestion, rhinorrhea, ear pain or discharge, sore throat, swollen glands, breast mass, nipple discharge, breast changes, abdominal pain, nausea, vomiting, diarrhea, constipation, cough, shortness of breath, chest pain, weight change, change in bowel habits, melena, rectal bleeding, dysuria, frequency, urgency, hematuria, polyuria, polydipsia, polyphagia, joint pain or swelling or erythema, edema, rash, weakness, paresthesias, vaginal discharge or bleeding or mood changes.  Remainder of review of systems was negative.      PHYSICAL EXAM:  On exam, patient is a WD, WN 59 y.o. female in [facility].  /70   Pulse 75   Ht 5' 6\" (1.676 m)   Wt 199 lb (90.3 kg)   LMP 09/16/2013   SpO2 99%   BMI 32.12 kg/m    Head normocephalic, atraumatic.  Eyes PERRL, ears TM s clear bilaterally.  Throat without significant erythemia or exudate.  Neck was supple, full range of motion. No significant lymphadenopathy or thyromegaly was appreciated.  Lungs clear to auscultation  Heart regular rate and rhythm.  Breast exam was done. No masses were appreciated. Axilla were clear bilaterally. No nipple discharge was appreciated. Self breast exam was reviewed and taught today.  Abdomen was soft, nontender, nondistended. No masses or organomegaly were palpated. Positive bowel sounds were appreciated.  Extremities with full range of motion of all 4 extremities were noted.  Deep tendon reflexes were equal and symmetrical. Motor and sensation were intact to both the upper and lower extremities.  Cranial nerves 2 through 12 were grossly intact.  EOM were " intact.  Patient declined pelvic exam and Pap smear.    Recent Results (from the past 240 hour(s))   Hemoglobin   Result Value Ref Range    Hemoglobin 14.1 12.0 - 16.0 g/dL   Urinalysis Macroscopic   Result Value Ref Range    Color, UA Yellow Colorless, Yellow, Straw, Light Yellow    Clarity, UA Clear Clear    Glucose, UA Negative Negative    Bilirubin, UA Negative Negative    Ketones, UA Negative Negative    Specific Gravity, UA 1.025 1.005 - 1.030    Blood, UA Negative Negative    pH, UA 7.0 5.0 - 8.0    Protein, UA Negative Negative mg/dL    Urobilinogen, UA 0.2 E.U./dL 0.2 E.U./dL, 1.0 E.U./dL    Nitrite, UA Negative Negative    Leukocytes, UA Negative Negative   Lipid Cascade   Result Value Ref Range    Cholesterol 186 <=199 mg/dL    Triglycerides 60 <=149 mg/dL    HDL Cholesterol 66 >=50 mg/dL    LDL Calculated 108 <=129 mg/dL    Patient Fasting > 8hrs? Unknown    Comprehensive Metabolic Panel   Result Value Ref Range    Sodium 138 136 - 145 mmol/L    Potassium 3.9 3.5 - 5.0 mmol/L    Chloride 100 98 - 107 mmol/L    CO2 24 22 - 31 mmol/L    Anion Gap, Calculation 14 5 - 18 mmol/L    Glucose 77 70 - 125 mg/dL    BUN 10 8 - 22 mg/dL    Creatinine 0.65 0.60 - 1.10 mg/dL    GFR MDRD Af Amer >60 >60 mL/min/1.73m2    GFR MDRD Non Af Amer >60 >60 mL/min/1.73m2    Bilirubin, Total 0.6 0.0 - 1.0 mg/dL    Calcium 9.7 8.5 - 10.5 mg/dL    Protein, Total 6.8 6.0 - 8.0 g/dL    Albumin 4.2 3.5 - 5.0 g/dL    Alkaline Phosphatase 79 45 - 120 U/L    AST 20 0 - 40 U/L    ALT 19 0 - 45 U/L

## 2021-06-10 NOTE — PROGRESS NOTES
PROGRESS NOTE   4/10/2017    SUBJECTIVE:  Maylin Ni is a 56 y.o. female  who presents for   Chief Complaint   Patient presents with     Cough     Check productive cough, congestion, headache sinus pressure, facial pain x 1 week Was seen over the weekend had negative strep and influenza     Hypertension     Recheck     Patient comes in today because she has been feeling well now for about the last week or so.  She has had a productive cough as well as nasal congestion headache sinus pressure and facial pain.  She was seen in urgent care over this past weekend and they did a strep screen which was negative as well as an influenza test which was negative.  She continues to feel worse instead of better and therefore comes in for evaluation.  She is wondering if perhaps the cough is causing her to have a headache but the rest of the symptoms just do not seem to be getting better at all.  She started using an inhaler on Friday night and she did get a new one yesterday because it did not seem to be working quite as well but really has not noticed a significant difference.  She has heard some wheezing in her chest over the weekend but it seems like it is a bit better now.  The cough has been primarily dry over the last 24 hours or so but prior to that it was quite wet.  Last night when she was trying to sleep her coughing was really bad and her sinus pressure seems to be much worse today than it has been and therefore she comes back in for evaluation.  She also needs a recheck on her hypertension medications.  She is on lisinopril 20 mg in the evening and lisinopril hydrochlorothiazide 20/20 5 in the morning.  This combination medication is working well for her.  She has been on this medication for quite some time without problems or difficulties.  She denies that she is currently having any side effects to the medication.  Her blood pressure today is 122/86.  She is not having any chest pain shortness of breath or  other difficulties.  She denies that she is having any swelling of her extremities.  She is does need a refill of these medications and also we need to draw labs to follow-up on these medications.    Patient Active Problem List   Diagnosis     Acute Pharyngitis     Abdominal Pain     Adrenal Cortical Adenoma     Complex atypical endometrial hyperplasia     Endometriosis     Decreased potassium in the blood     Elevated WBC count     SBO (small bowel obstruction)     Airway hyperreactivity     Essential (primary) hypertension       Current Outpatient Prescriptions   Medication Sig Dispense Refill     albuterol (PROVENTIL HFA;VENTOLIN HFA) 90 mcg/actuation inhaler Inhale 2 puffs every 6 (six) hours as needed for wheezing. 18 g 0     lisinopril (PRINIVIL,ZESTRIL) 20 MG tablet Take 1 tablet (20 mg total) by mouth daily. 90 tablet 1     lisinopril-hydrochlorothiazide (PRINZIDE,ZESTORETIC) 20-25 mg per tablet Take 1 tablet by mouth every morning. 90 tablet 1     azithromycin (ZITHROMAX Z-YELENA) 250 MG tablet Take 2 tablets (500 mg) on  Day 1,  followed by 1 tablet (250 mg) once daily on Days 2 through 5. 6 tablet 0     No current facility-administered medications for this visit.        No Known Allergies    Past Medical History:   Diagnosis Date     Endometriosis      Hypertension      Normal delivery     x3, 1986, 1988, 1991     Tubular adenoma 4/2015    found on colonoscopy, repeat in 5 yrs     Unspecified intestinal obstruction 10/2012    small bowel obstruction, thought to be secondary to raw carrots and apples       Past Surgical History:   Procedure Laterality Date     APPENDECTOMY  1980    with R ovary, fallopian tube surgery     CHOLECYSTECTOMY  1997    Laparoscopic     HYSTERECTOMY  2012    has left ovary remaining     oophorectomy  1980    R ovary and fallopian tube removed, endometriosis       History   Smoking Status     Never Smoker   Smokeless Tobacco     Never Used       OBJECTIVE:     /86 (Patient  "Site: Right Arm, Patient Position: Sitting, Cuff Size: Adult Large)  Pulse 100 Comment: regular  Temp 98.3  F (36.8  C) (Oral)   Resp 14 Comment: regular  Ht 5' 6\" (1.676 m)  Wt (!) 248 lb 4.8 oz (112.6 kg)  LMP 09/16/2013  BMI 40.08 kg/m2    Physical Exam:  GENERAL APPEARANCE: A&A, NAD, well hydrated, well nourished  SKIN:  Normal skin turgor, no lesions/rashes   HEENT: moist mucous membranes, no rhinorrhea, PERRLA, TM's clear bilaterally, Throat without significant erythema or exudate.  No significant tenderness noted over the maxillary or the frontal sinuses bilaterally.  NECK: Supple, full ROM, no significant lymphadenopathy or thyromegaly  CV: RRR, no M/G/R   LUNGS: CTAB, no wheezing appreciated   EXTREMITY: no edema   NEURO: no gross deficits   PSYCHIATRIC;  Mood appropriate, memory intact    LABS:     Recent Results (from the past 240 hour(s))   Comprehensive Metabolic Panel   Result Value Ref Range    Sodium 140 136 - 145 mmol/L    Potassium 3.8 3.5 - 5.0 mmol/L    Chloride 103 98 - 107 mmol/L    CO2 25 22 - 31 mmol/L    Anion Gap, Calculation 12 5 - 18 mmol/L    Glucose 94 70 - 125 mg/dL    BUN 7 (L) 8 - 22 mg/dL    Creatinine 0.70 0.60 - 1.10 mg/dL    GFR MDRD Af Amer >60 >60 mL/min/1.73m2    GFR MDRD Non Af Amer >60 >60 mL/min/1.73m2    Bilirubin, Total 0.5 0.0 - 1.0 mg/dL    Calcium 9.5 8.5 - 10.5 mg/dL    Protein, Total 6.9 6.0 - 8.0 g/dL    Albumin 4.0 3.5 - 5.0 g/dL    Alkaline Phosphatase 79 45 - 120 U/L    AST 19 0 - 40 U/L    ALT 20 0 - 45 U/L   Lipid Cascade   Result Value Ref Range    Cholesterol 167 <=199 mg/dL    Triglycerides 75 <=149 mg/dL    HDL Cholesterol 52 >=50 mg/dL    LDL Calculated 100 <=129 mg/dL    Patient Fasting > 8hrs? Yes        ASSESSMENT/PLAN:     Bronchitis [J40]      1. Bronchitis  - azithromycin (ZITHROMAX Z-YELENA) 250 MG tablet; Take 2 tablets (500 mg) on  Day 1,  followed by 1 tablet (250 mg) once daily on Days 2 through 5.  Dispense: 6 tablet; Refill: 0    2. " Essential (primary) hypertension  - Comprehensive Metabolic Panel  - Lipid Cascade  - lisinopril (PRINIVIL,ZESTRIL) 20 MG tablet; Take 1 tablet (20 mg total) by mouth daily.  Dispense: 90 tablet; Refill: 1  - lisinopril-hydrochlorothiazide (PRINZIDE,ZESTORETIC) 20-25 mg per tablet; Take 1 tablet by mouth every morning.  Dispense: 90 tablet; Refill: 1    Patient overall seems to be doing okay.  In terms of her cold and upper respiratory infection symptoms I suspect that she has bronchitis.  She has not seemed to improve over a week now and therefore I think she does need to be started on antibiotic.  I did send a prescription for Zithromax to the pharmacy.  She has additional questions or concerns he does not have gradual improvement in her symptoms she certainly should let me know.  She absolutely comes to continue on the albuterol inhaler that she has at home.  She should use this on an as-needed basis.  In terms of her hypertension her blood pressure looks fine today.  We will recheck labs today and then I did also refill both her lisinopril had a chlorothiazide as well as her lisinopril tablets.  Each were given for 90 day supply with a refill which should take her through the next 6 months.  All of her questions were answered today.  If she has additional questions or concerns or again does not have gradual improvement in her symptoms she certainly should let me know.  We will see her back in 6 months for follow-up of her hypertension.  Janeth Billingsley MD     Ears: no ear pain and no hearing problems.Nose: no nasal congestion and no nasal drainage.Mouth/Throat: no dysphagia, no hoarseness and no throat pain.Neck: no lumps, no pain, no stiffness and no swollen glands.

## 2021-06-10 NOTE — PROGRESS NOTES
"Maylin Ni is a 60 y.o. female who is being evaluated via a billable telephone visit.      The patient has been notified of following:     \"This telephone visit will be conducted via a call between you and your physician/provider. We have found that certain health care needs can be provided without the need for a physical exam.  This service lets us provide the care you need with a short phone conversation.  If a prescription is necessary we can send it directly to your pharmacy.  If lab work is needed we can place an order for that and you can then stop by our lab to have the test done at a later time.    Telephone visits are billed at different rates depending on your insurance coverage. During this emergency period, for some insurers they may be billed the same as an in-person visit.  Please reach out to your insurance provider with any questions.    If during the course of the call the physician/provider feels a telephone visit is not appropriate, you will not be charged for this service.\"    Patient has given verbal consent to a Telephone visit? Yes    What phone number would you like to be contacted at? [phone]    Patient would like to receive their AVS by AVS Preference: Rachel.     Chief Complaint   Patient presents with     Medication Management     no concerns: update b/p meds- lab appt sched for fri 8/14 w/ b/p check     Medication Refill     lisinopril & lisinopril-hctz     Allergies   Allergen Reactions     Other Drug Allergy (See Comments)      Reacted to flu vaccine 10/2018     Past Medical History:   Diagnosis Date     Endometriosis      H/O colonoscopy 07/31/2020    nl, repeat 5 years for polyp surveillance     Hypertension      Normal delivery     x3, 1986, 1988, 1991     Tubular adenoma 4/2015    found on colonoscopy, repeat in 5 yrs     Unspecified intestinal obstruction 10/2012    small bowel obstruction, thought to be secondary to raw carrots and apples     Past Surgical History: "   Procedure Laterality Date     APPENDECTOMY  1980    with R ovary, fallopian tube surgery     CHOLECYSTECTOMY  1997    Laparoscopic     HYSTERECTOMY  2012    has left ovary remaining     oophorectomy Right 1980    R ovary and fallopian tube removed, endometriosis     OOPHORECTOMY Right     1 removed, 1 remains     right wrist 1st dorsal compartment release Right 08/2019    [facility]     Family History   Problem Relation Age of Onset     Colon cancer Mother         dx @ 64     Hypertension Mother      Osteopenia Mother      Lupus Mother      Heart disease Father         MI, sudden cardiac death     Hypertension Father      Stroke Father 59        carotid endarterectomy     Heart attack Father      Uterine cancer Sister         leiomyosarcoma     Breast cancer Paternal Grandmother        Current Outpatient Medications:      lisinopriL (PRINIVIL,ZESTRIL) 20 MG tablet, Take 1 tablet (20 mg total) by mouth daily., Disp: 90 tablet, Rfl: 1     lisinopriL-hydrochlorothiazide (PRINZIDE,ZESTORETIC) 20-25 mg per tablet, Take 1 tablet by mouth every morning., Disp: 90 tablet, Rfl: 1     phentermine 30 MG capsule, Take 30 mg by mouth every morning., Disp: , Rfl:      albuterol (PROVENTIL HFA;VENTOLIN HFA) 90 mcg/actuation inhaler, Inhale 2 puffs every 6 (six) hours as needed for wheezing., Disp: 18 g, Rfl: 0    Additional provider notes:   Patient is seen today via telephone visit rather than office visit due to the COVID 19 outbreak pandemic.  This is done for the protection of patient from potential exposure to this virus by coming to the clinic.  Patient is being evaluated today for follow-up of her hypertension.  She has a history of hypertension for many years.  She currently is taking lisinopril hydrochlorothiazide in the morning and lisinopril in the evening.  She overall is doing well.  She is not have any side effects to these medications.  She will occasionally take her blood pressure and it is always been in  acceptable range.  She had a colonoscopy last Friday and her blood pressure was 130/80 on arrival to the gastroenterology clinic.  She notes that she is feeling well.  She denies that she is having any chest pain or shortness of breath or problems with swelling.  She is honest and says that she always remembers her blood pressure medication in the morning and she probably remembers the evening dose about 50% of the time.  She does have access to checking her blood pressure and every once a while she will check it and so is been in the acceptable range.  She does have a history of needing an albuterol inhaler only when she is ill or when the seasons change.  She does have an inhaler at home but she has not had to use anything for about 2 years.  Does need labs to follow-up on her blood pressure medications.  She does need refills to be sent today as well.    Recent Results (from the past 240 hour(s))   Comprehensive Metabolic Panel   Result Value Ref Range    Sodium 137 136 - 145 mmol/L    Potassium 3.8 3.5 - 5.0 mmol/L    Chloride 102 98 - 107 mmol/L    CO2 27 22 - 31 mmol/L    Anion Gap, Calculation 8 5 - 18 mmol/L    Glucose 88 70 - 125 mg/dL    BUN 14 8 - 22 mg/dL    Creatinine 0.68 0.60 - 1.10 mg/dL    GFR MDRD Af Amer >60 >60 mL/min/1.73m2    GFR MDRD Non Af Amer >60 >60 mL/min/1.73m2    Bilirubin, Total 0.6 0.0 - 1.0 mg/dL    Calcium 9.3 8.5 - 10.5 mg/dL    Protein, Total 6.4 6.0 - 8.0 g/dL    Albumin 3.9 3.5 - 5.0 g/dL    Alkaline Phosphatase 76 45 - 120 U/L    AST 21 0 - 40 U/L    ALT 19 0 - 45 U/L   Lipid Cascade   Result Value Ref Range    Cholesterol 183 <=199 mg/dL    Triglycerides 52 <=149 mg/dL    HDL Cholesterol 65 >=50 mg/dL    LDL Calculated 108 <=129 mg/dL    Patient Fasting > 8hrs? Yes        Assessment/Plan:  1. Essential (primary) hypertension  - Comprehensive Metabolic Panel; Future  - Lipid Cascade; Future  - lisinopriL (PRINIVIL,ZESTRIL) 20 MG tablet; Take 1 tablet (20 mg total) by mouth  daily.  Dispense: 90 tablet; Refill: 1  - lisinopriL-hydrochlorothiazide (PRINZIDE,ZESTORETIC) 20-25 mg per tablet; Take 1 tablet by mouth every morning.  Dispense: 90 tablet; Refill: 1    Patient overall seems to be doing well.  She seems to be tolerating her medication without problems.  Her blood pressure last Friday looked great.  We will have her return to do blood pressure check and lab only appointment sometime within the next couple of weeks.  We actually did make her an appointment for this coming Friday for lab only and she will also have her blood pressure checked at that time.  I will contact her with results of the lab work when it returns.  I did refill her lisinopril and her lisinopril hydrochlorothiazide today.  She will continue to take them as directed.  All of her questions were answered today.  We will see her back in about 6 months for follow-up of her medications but also for physical exam.  If patient has any changes in her symptoms will certainly let me know.    Phone call duration:  10 minutes    Janeth Billingsley MD

## 2021-06-10 NOTE — TELEPHONE ENCOUNTER
Medication Question or Clarification  Who is calling: Pharmacy   What medication are you calling about (include dose and sig)?:    Disp  Refills  Start  End     lisinopriL-hydrochlorothiazide (PRINZIDE,ZESTORETIC) 20-25 mg per tablet  90 tablet  1  8/10/2020      Sig - Route: Take 1 tablet by mouth every morning. - Oral     Sent to pharmacy as: lisinopril 20 mg-hydrochlorothiazide 25 mg tablet (PRINZIDE,ZESTORETIC)     E-Prescribing Status: Receipt confirmed by pharmacy (8/10/2020 11:57 AM CDT)        Disp  Refills  Start  End     lisinopriL (PRINIVIL,ZESTRIL) 20 MG tablet  90 tablet  1  8/10/2020      Sig - Route: Take 1 tablet (20 mg total) by mouth daily. - Oral     Sent to pharmacy as: lisinopriL 20 mg tablet (PRINIVIL,ZESTRIL)     E-Prescribing Status: Receipt confirmed by pharmacy (8/10/2020 11:57 AM CDT)         Who prescribed the medication?: aJneth Billingsley MD   What is your question/concern?: Caller is needing to know which one patient is suppose to take.   [facility]ed [facility]: [facility]  Okay to leave a detailed message?: Yes  [phone]

## 2021-06-10 NOTE — TELEPHONE ENCOUNTER
Spoke with Dr. Billingsley and clarified medication dosing. Called pharmacy and relayed message.     No further action needed.     Ember Lee, CMA

## 2021-06-15 NOTE — PROGRESS NOTES
Mayiln Ni is a 57 y.o. here for a Physical exam. Patient is overall doing well.  She continues on lisinopril hydrochlorothiazide in the morning and lisinopril in the evening for her blood pressure.  This combination seems to be working well.  Her blood pressure today is 122/80.  She denies that she is having any side effects or problems with this medication.  She does need a refill of this medication and that will be sent today.  She does have an albuterol inhaler at home but she only uses it very occasionally when she gets ill.  She otherwise is doing well.  She has had a hysterectomy for noncancerous reasons and will not repeat a Pap smear today.  Tdap is up-to-date she has had a flu vaccine this year already.  She just recently had a mammogram so she is up-to-date in terms of that as well and her colonoscopy is up-to-date as well.  She does need colonoscopy every 5 years because of maternal history of colon cancer as well as personal history of colon polyps.    Healthy Habits:   Regular Exercise: Yes  Sunscreen Use: Yes  Healthy Diet: Yes  Dental Visits Regularly: Yes  Seat Belt: Yes  Sexually active: Yes  Self Breast Exam Monthly:Yes  Hemoccults: No  Flex Sig: No  Colonoscopy: Yes  Lipid Profile: Yes  Glucose Screen: Yes  Prevention of Osteoporosis: No  Last Dexa: No  Guns at Home:  No  Domestic Violence:  No    Current Outpatient Medications Include:    Current Outpatient Prescriptions:      albuterol (PROVENTIL HFA;VENTOLIN HFA) 90 mcg/actuation inhaler, Inhale 2 puffs every 6 (six) hours as needed for wheezing., Disp: 18 g, Rfl: 0     lisinopril (PRINIVIL,ZESTRIL) 20 MG tablet, Take 1 tablet (20 mg total) by mouth daily., Disp: 90 tablet, Rfl: 1     lisinopril-hydrochlorothiazide (PRINZIDE,ZESTORETIC) 20-25 mg per tablet, Take 1 tablet by mouth every morning., Disp: 90 tablet, Rfl: 1    Allergies:  No Known Allergies    Past Medical History:   Diagnosis Date     Endometriosis      Hypertension       Normal delivery     x3, 1986, 1988, 1991     Tubular adenoma 4/2015    found on colonoscopy, repeat in 5 yrs     Unspecified intestinal obstruction 10/2012    small bowel obstruction, thought to be secondary to raw carrots and apples       Past Surgical History:   Procedure Laterality Date     APPENDECTOMY  1980    with R ovary, fallopian tube surgery     CHOLECYSTECTOMY  1997    Laparoscopic     HYSTERECTOMY  2012    has left ovary remaining     oophorectomy Right 1980    R ovary and fallopian tube removed, endometriosis       Immunization History   Administered Date(s) Administered     Influenza [id number], 11/09/2009     Influenza, inj, historic,unspecified 10/23/2014, 10/22/2015, 10/26/2016, 10/06/2017     Influenza,[id number],Pandemic,split,IM 12/04/2009     Influenza,seasonal, Inj IIV3 10/20/2008, 11/08/2009, 10/25/2010, 10/09/2012, 10/10/2013     Pneumo Polysac 23-V 07/04/2012     Td,adult,historic,unspecified 03/20/2006     Tdap 02/12/2010, 02/12/2010, 07/26/2013       Family History   Problem Relation Age of Onset     Colon cancer Mother      dx @ 64     Hypertension Mother      Osteopenia Mother      Lupus Mother      Heart disease Father      MI, sudden cardiac death     Hypertension Father      Stroke Father 59     carotid endarterectomy     Heart attack Father      Uterine cancer Sister      leiomyosarcoma     Breast cancer Paternal Grandmother        Social History     Social History     Marital status:      Spouse name: Edgar     Number of children: 3     Years of education: N/A     Occupational History     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[employer] [place]     Social History Main Topics     Smoking status: Never Smoker     Smokeless tobacco: Never Used     Alcohol use 0.5 oz/week     1 Standard drinks or equivalent per week     Drug use: No     Sexual activity: Yes     Partners: Male     Birth control/ protection: Surgical      Comment: hysterectomy     Other Topics Concern     Not on file     Social  "History Narrative       Last cholesterol:   Lab Results   Component Value Date    CHOL 171 01/16/2018    CHOL 167 04/10/2017    CHOL 191 11/11/2016     Lab Results   Component Value Date    HDL 58 01/16/2018    HDL 52 04/10/2017    HDL 65 11/11/2016     Lab Results   Component Value Date    LDLCALC 96 01/16/2018    LDLCALC 100 04/10/2017    LDLCALC 111 11/11/2016     Lab Results   Component Value Date    TRIG 86 01/16/2018    TRIG 75 04/10/2017    TRIG 75 11/11/2016       Last mammogram: 12/29/2017    Birth Control Method: hysterectomy  High Risk/Behavior: none    PREVENTATIVE SERVICES  Preventative services were reviewed today, 1/16/2018    LMP: Patient's last menstrual period was 09/16/2013.  Menstrual Regularity: n/a  Flow: n/a    REVIEW OF SYSTEMS:  Denies fever, chills, visual changes, fatigue, myalgias, nasal congestion, rhinorrhea, ear pain or discharge, sore throat, swollen glands, breast mass, nipple discharge, breast changes, abdominal pain, nausea, vomiting, diarrhea, constipation, cough, shortness of breath, chest pain, weight change, change in bowel habits, melena, rectal bleeding, dysuria, frequency, urgency, hematuria, polyuria, polydipsia, polyphagia, joint pain or swelling or erythema, edema, rash, weakness, paresthesias, vaginal discharge or bleeding or mood changes.  Remainder of review of systems was negative.      PHYSICAL EXAM:  On exam, patient is a WD, WN 57 y.o. female in NAD.  /80 (Patient Site: Left Arm, Patient Position: Sitting, Cuff Size: Adult Large)  Pulse 76 Comment: reular  Temp 97.8  F (36.6  C) (Oral)   Resp 14 Comment: regular  Ht 5' 6\" (1.676 m)  Wt (!) 258 lb 1.6 oz (117.1 kg)  LMP 09/16/2013  BMI 41.66 kg/m2  Head normocephalic, atraumatic.  Eyes PERRL, ears TM s clear bilaterally.  Throat without significant erythemia or exudate.  Neck was supple, full range of motion. No significant lymphadenopathy or thyromegaly was appreciated.  Lungs clear to " auscultation  Heart regular rate and rhythm.  Breast exam was done. No masses were appreciated. Axilla were clear bilaterally. No nipple discharge was appreciated. Self breast exam was reviewed and taught today.  Abdomen was soft, nontender, nondistended. No masses or organomegaly were palpated. Positive bowel sounds were appreciated.  Extremities with full range of motion of all 4 extremities were noted.  Deep tendon reflexes were equal and symmetrical. Motor and sensation were intact to both the upper and lower extremities.  Cranial nerves 2 through 12 were grossly intact.  EOM were intact.  Pelvic exam was done.  External genitalia appeared normal. Bimanual exam revealed no palpable masses.     Recent Results (from the past 240 hour(s))   Hemoglobin   Result Value Ref Range    Hemoglobin 13.7 12.0 - 16.0 g/dL   Urinalysis Macroscopic   Result Value Ref Range    Color, UA Yellow Colorless, Yellow, Straw, Light Yellow    Clarity, UA Clear Clear    Glucose, UA Negative Negative    Bilirubin, UA Negative Negative    Ketones, UA Negative Negative    Specific Gravity, UA 1.015 1.005 - 1.030    Blood, UA Trace (!) Negative    pH, UA 6.5 5.0 - 8.0    Protein, UA Negative Negative mg/dL    Urobilinogen, UA 0.2 E.U./dL 0.2 E.U./dL, 1.0 E.U./dL    Nitrite, UA Negative Negative    Leukocytes, UA Negative Negative   Lipid Cascade   Result Value Ref Range    Cholesterol 171 <=199 mg/dL    Triglycerides 86 <=149 mg/dL    HDL Cholesterol 58 >=50 mg/dL    LDL Calculated 96 <=129 mg/dL    Patient Fasting > 8hrs? Yes    Comprehensive Metabolic Panel   Result Value Ref Range    Sodium 138 136 - 145 mmol/L    Potassium 3.7 3.5 - 5.0 mmol/L    Chloride 102 98 - 107 mmol/L    CO2 27 22 - 31 mmol/L    Anion Gap, Calculation 9 5 - 18 mmol/L    Glucose 92 70 - 125 mg/dL    BUN 12 8 - 22 mg/dL    Creatinine 0.70 0.60 - 1.10 mg/dL    GFR MDRD Af Amer >60 >60 mL/min/1.73m2    GFR MDRD Non Af Amer >60 >60 mL/min/1.73m2    Bilirubin, Total 0.8  0.0 - 1.0 mg/dL    Calcium 9.7 8.5 - 10.5 mg/dL    Protein, Total 6.9 6.0 - 8.0 g/dL    Albumin 4.0 3.5 - 5.0 g/dL    Alkaline Phosphatase 68 45 - 120 U/L    AST 22 0 - 40 U/L    ALT 25 0 - 45 U/L       ASSESSMENT: 57 y.o. female physical exam and pap smear.    PLAN:     Routine general medical examination at a health care facility [Z00.00]    1. Routine general medical examination at a health care facility  - Hemoglobin  - Urinalysis Macroscopic  - Lipid Cascade    2. Essential (primary) hypertension  - lisinopril-hydrochlorothiazide (PRINZIDE,ZESTORETIC) 20-25 mg per tablet; Take 1 tablet by mouth every morning.  Dispense: 90 tablet; Refill: 1  - lisinopril (PRINIVIL,ZESTRIL) 20 MG tablet; Take 1 tablet (20 mg total) by mouth daily.  Dispense: 90 tablet; Refill: 1  - Lipid Cascade  - Comprehensive Metabolic Panel    Patient is a 57 y.o. female who is overall doing well.  He continues on medication for high pressure which seems to be controlling her blood pressure.  Refill of lisinopril hydrochlorothiazide as well as lisinopril was sent to the pharmacy today.  Lipid panel as well as electrolytes were drawn today and will contact her with results of those and they return.  She is up-to-date with her colonoscopy as well as her mammogram as well as her immunizations. She overall feels like things are doing well.  All of her questions and concerns were addressed today.  If she has additional problems or concerns should let me know.    Will contact her with the results of the labs when available.  I should see her back in 6 months for follow-up of her hypertension.  Janeth Billingsley M.D.

## 2021-06-16 ENCOUNTER — TRANSFERRED RECORDS (OUTPATIENT)
Dept: HEALTH INFORMATION MANAGEMENT | Facility: CLINIC | Age: 61
End: 2021-06-16

## 2021-06-23 NOTE — TELEPHONE ENCOUNTER
RN cannot approve Refill Request    RN can NOT refill this medication Protocol failed and NO refill given. Last office visit: 4/10/2017 Janeth Billingsley MD Last Physical: 1/16/2018 Last MTM visit: Visit date not found Last visit same specialty: 4/10/2017 Janeth Billingsley MD.  Next visit within 3 mo: Visit date not found  Next physical within 3 mo: Visit date not found      Migdalia Rankin, [facility] Triage/Med Refill 1/16/2019    Requested Prescriptions   Pending Prescriptions Disp Refills     lisinopril-hydrochlorothiazide (PRINZIDE,ZESTORETIC) 20-25 mg per tablet [Pharmacy Med Name: LISINOPRIL-HCTZ 20/25MG TABLETS] 90 tablet 0     Sig: TAKE 1 TABLET BY MOUTH EVERY MORNING    Diuretics/Combination Diuretics Refill Protocol  Failed - 1/16/2019  3:26 AM       Failed - Visit with PCP or prescribing provider visit in past 12 months    Last office visit with prescriber/PCP: 4/10/2017 Janeth Billingsley MD OR same dept: Visit date not found OR same specialty: 4/10/2017 Janeht Billingsley MD  Last physical: 1/16/2018 Last MTM visit: Visit date not found   Next visit within 3 mo: Visit date not found  Next physical within 3 mo: Visit date not found  Prescriber OR PCP: Janeth Billingsley MD  Last diagnosis associated with med order: 1. Essential (primary) hypertension  - lisinopril-hydrochlorothiazide (PRINZIDE,ZESTORETIC) 20-25 mg per tablet [Pharmacy Med Name: LISINOPRIL-HCTZ 20/25MG TABLETS]; TAKE 1 TABLET BY MOUTH EVERY MORNING  Dispense: 90 tablet; Refill: 0  - lisinopril (PRINIVIL,ZESTRIL) 20 MG tablet [Pharmacy Med Name: LISINOPRIL 20MG TABLETS]; TAKE 1 TABLET(20 MG) BY MOUTH DAILY  Dispense: 90 tablet; Refill: 0    If protocol passes may refill for 12 months if within 3 months of last provider visit (or a total of 15 months).            Passed - Serum Potassium in past 12 months     Lab Results   Component Value Date    Potassium 3.7 01/16/2018            Passed - Serum Sodium in past 12 months      Lab Results   Component Value Date    Sodium 138 01/16/2018            Passed - Blood pressure on file in past 12 months    BP Readings from Last 1 Encounters:   01/16/18 122/80            Passed - Serum Creatinine in past 12 months     Creatinine   Date Value Ref Range Status   01/16/2018 0.70 0.60 - 1.10 mg/dL Final             lisinopril (PRINIVIL,ZESTRIL) 20 MG tablet [Pharmacy Med Name: LISINOPRIL 20MG TABLETS] 90 tablet 0     Sig: TAKE 1 TABLET(20 MG) BY MOUTH DAILY    Ace Inhibitors Refill Protocol Failed - 1/16/2019  3:26 AM       Failed - PCP or prescribing provider visit in past 12 months      Last office visit with prescriber/PCP: 4/10/2017 Janeth Billingsley MD OR same dept: Visit date not found OR same specialty: 4/10/2017 Janeth Billingsley MD  Last physical: 1/16/2018 Last MTM visit: Visit date not found   Next visit within 3 mo: Visit date not found  Next physical within 3 mo: Visit date not found  Prescriber OR PCP: Janeth Billingsley MD  Last diagnosis associated with med order: 1. Essential (primary) hypertension  - lisinopril-hydrochlorothiazide (PRINZIDE,ZESTORETIC) 20-25 mg per tablet [Pharmacy Med Name: LISINOPRIL-HCTZ 20/25MG TABLETS]; TAKE 1 TABLET BY MOUTH EVERY MORNING  Dispense: 90 tablet; Refill: 0  - lisinopril (PRINIVIL,ZESTRIL) 20 MG tablet [Pharmacy Med Name: LISINOPRIL 20MG TABLETS]; TAKE 1 TABLET(20 MG) BY MOUTH DAILY  Dispense: 90 tablet; Refill: 0    If protocol passes may refill for 12 months if within 3 months of last provider visit (or a total of 15 months).            Passed - Serum Potassium in past 12 months    Lab Results   Component Value Date    Potassium 3.7 01/16/2018            Passed - Blood pressure filed in past 12 months    BP Readings from Last 1 Encounters:   01/16/18 122/80            Passed - Serum Creatinine in past 12 months    Creatinine   Date Value Ref Range Status   01/16/2018 0.70 0.60 - 1.10 mg/dL Final

## 2021-06-23 NOTE — PROGRESS NOTES
Maylin Ni is a 58 y.o. here for a Pap smear and physical exam. Patient is overall doing well.  She continues on lisinopril and lisinopril hydrochlorothiazide for her blood pressure.  This combination medication is working well for her.  She has been on this for several years.  She does need labs to be drawn today to follow-up on these medications.  Her blood pressure looks excellent today at 132/73.  She denies that she is having any chest pain or shortness of breath or other difficulties.  She does note that she had a reaction to her flu shot this year.  She thinks that that is probably what it was because she got really sick right after she got her flu vaccination.  She seems to have recovered from that fine.  She had a mammogram last week.  She is due for colonoscopy in 2020.  She has been trying to lose weight and has lost 3 pounds since last week so is quite proud of that.  Patient does have an albuterol inhaler at home that she uses only when she gets ill.    Healthy Habits:   Regular Exercise: Yes  Sunscreen Use: Yes  Healthy Diet: Yes  Dental Visits Regularly: Yes  Seat Belt: Yes  Sexually active: Yes  Self Breast Exam Monthly:Yes  Hemoccults: N/A  Flex Sig: N/A  Colonoscopy: Yes  Lipid Profile: N/A  Glucose Screen: N/A  Prevention of Osteoporosis: No  Last Dexa: No  Guns at Home:  Yes  Guns Safety Locks:  Yes  Domestic Violence:  No    Current Outpatient Medications Include:    Current Outpatient Medications:      lisinopril (PRINIVIL,ZESTRIL) 20 MG tablet, Take 1 tablet (20 mg total) by mouth daily., Disp: 90 tablet, Rfl: 1     lisinopril-hydrochlorothiazide (PRINZIDE,ZESTORETIC) 20-25 mg per tablet, Take 1 tablet by mouth every morning., Disp: 90 tablet, Rfl: 1     albuterol (PROVENTIL HFA;VENTOLIN HFA) 90 mcg/actuation inhaler, Inhale 2 puffs every 6 (six) hours as needed for wheezing., Disp: 18 g, Rfl: 0    Allergies:    Allergies   Allergen Reactions     Other Drug Allergy (See Comments)       Reacted to flu vaccine 10/2018       Past Medical History:   Diagnosis Date     Endometriosis      Hypertension      Normal delivery     x3, 1986, 1988, 1991     Tubular adenoma 4/2015    found on colonoscopy, repeat in 5 yrs     Unspecified intestinal obstruction 10/2012    small bowel obstruction, thought to be secondary to raw carrots and apples       Past Surgical History:   Procedure Laterality Date     APPENDECTOMY  1980    with R ovary, fallopian tube surgery     CHOLECYSTECTOMY  1997    Laparoscopic     HYSTERECTOMY  2012    has left ovary remaining     oophorectomy Right 1980    R ovary and fallopian tube removed, endometriosis       Immunization History   Administered Date(s) Administered     Influenza A2b6-11, 11/09/2009     Influenza, inj, historic,unspecified 10/23/2014, 10/22/2015, 10/26/2016, 10/06/2017     Influenza,W4B7-23,Pandemic,split,IM 12/04/2009     Influenza,seasonal, Inj IIV3 10/20/2008, 11/08/2009, 10/25/2010, 10/09/2012, 10/10/2013     Pneumo Polysac 23-V 07/04/2012     Td,adult,historic,unspecified 03/20/2006     Tdap 02/12/2010, 02/12/2010, 07/26/2013       Family History   Problem Relation Age of Onset     Colon cancer Mother         dx @ 64     Hypertension Mother      Osteopenia Mother      Lupus Mother      Heart disease Father         MI, sudden cardiac death     Hypertension Father      Stroke Father 59        carotid endarterectomy     Heart attack Father      Uterine cancer Sister         leiomyosarcoma     Breast cancer Paternal Grandmother        Social History     Socioeconomic History     Marital status:      Spouse name: Edgar     Number of children: 3     Years of education: Not on file     Highest education level: Not on file   Social Needs     Financial resource strain: Not on file     Food insecurity - worry: Not on file     Food insecurity - inability: Not on file     Transportation needs - medical: Not on file     Transportation needs - non-medical: Not on file    Occupational History     Occupation: RN Manager     Employer: [employer]     Comment: [employer]   Tobacco Use     Smoking status: Never Smoker     Smokeless tobacco: Never Used   Substance and Sexual Activity     Alcohol use: Yes     Alcohol/week: 0.5 oz     Types: 1 Standard drinks or equivalent per week     Frequency: 2-4 times a month     Drinks per session: 1 or 2     Binge frequency: Never     Drug use: No     Sexual activity: Yes     Partners: Male     Birth control/protection: Surgical     Comment: hysterectomy   Other Topics Concern     Not on file   Social History Narrative     Not on file       Last cholesterol:   Lab Results   Component Value Date    CHOL 164 01/29/2019    CHOL 171 01/16/2018    CHOL 167 04/10/2017     Lab Results   Component Value Date    HDL 54 01/29/2019    HDL 58 01/16/2018    HDL 52 04/10/2017     Lab Results   Component Value Date    LDLCALC 93 01/29/2019    LDLCALC 96 01/16/2018    LDLCALC 100 04/10/2017     Lab Results   Component Value Date    TRIG 86 01/29/2019    TRIG 86 01/16/2018    TRIG 75 04/10/2017       Last mammogram: 12/29/2018    Birth Control Method: Hysterectomy  High Risk/Behavior: No    PREVENTATIVE SERVICES  Preventative services were reviewed today, 1/29/2019    LMP: Patient's last menstrual period was 09/16/2013.  Menstrual Regularity: n/a  Flow: n/a    REVIEW OF SYSTEMS:   Denies fever, chills, visual changes, fatigue, myalgias, nasal congestion, rhinorrhea, ear pain or discharge, sore throat, swollen glands, breast mass, nipple discharge, breast changes, abdominal pain, nausea, vomiting, diarrhea, constipation, cough, shortness of breath, chest pain, weight change, change in bowel habits, melena, rectal bleeding, dysuria, frequency, urgency, hematuria, polyuria, polydipsia, polyphagia, joint pain or swelling or erythema, edema, rash, weakness, paresthesias, vaginal discharge or bleeding or mood changes.  Remainder of review of systems was  negative.      PHYSICAL EXAM:  On exam, patient is a WD, WN 58 y.o. female in NAD.  /73   Pulse 97   Wt (!) 251 lb (113.9 kg)   LMP 09/16/2013   SpO2 98%   BMI 40.51 kg/m    Head normocephalic, atraumatic.  Eyes PERRL, ears TM s clear bilaterally.  Throat without significant erythemia or exudate.  Neck was supple, full range of motion. No significant lymphadenopathy or thyromegaly was appreciated.  Lungs clear to auscultation  Heart regular rate and rhythm.  Breast exam was done. No masses were appreciated. Axilla were clear bilaterally. No nipple discharge was appreciated. Self breast exam was reviewed and taught today.  Abdomen was soft, nontender, nondistended. No masses or organomegaly were palpated. Positive bowel sounds were appreciated.  Extremities with full range of motion of all 4 extremities were noted.  Deep tendon reflexes were equal and symmetrical. Motor and sensation were intact to both the upper and lower extremities.  Cranial nerves 2 through 12 were grossly intact.  EOM were intact.  Pelvic exam was done.  External genitalia appeared normal. Speculum was introduced and the Pap smear obtained. Bimanual exam revealed no palpable masses.     Recent Results (from the past 240 hour(s))   Hemoglobin   Result Value Ref Range    Hemoglobin 13.8 12.0 - 16.0 g/dL   Lipid Cascade   Result Value Ref Range    Cholesterol 164 <=199 mg/dL    Triglycerides 86 <=149 mg/dL    HDL Cholesterol 54 >=50 mg/dL    LDL Calculated 93 <=129 mg/dL    Patient Fasting > 8hrs? Yes    Comprehensive Metabolic Panel   Result Value Ref Range    Sodium 139 136 - 145 mmol/L    Potassium 4.0 3.5 - 5.0 mmol/L    Chloride 101 98 - 107 mmol/L    CO2 27 22 - 31 mmol/L    Anion Gap, Calculation 11 5 - 18 mmol/L    Glucose 94 70 - 125 mg/dL    BUN 9 8 - 22 mg/dL    Creatinine 0.73 0.60 - 1.10 mg/dL    GFR MDRD Af Amer >60 >60 mL/min/1.73m2    GFR MDRD Non Af Amer >60 >60 mL/min/1.73m2    Bilirubin, Total 0.7 0.0 - 1.0 mg/dL     Calcium 10.0 8.5 - 10.5 mg/dL    Protein, Total 7.1 6.0 - 8.0 g/dL    Albumin 4.3 3.5 - 5.0 g/dL    Alkaline Phosphatase 70 45 - 120 U/L    AST 24 0 - 40 U/L    ALT 24 0 - 45 U/L   Urinalysis Macroscopic   Result Value Ref Range    Color, UA Yellow Colorless, Yellow, Straw, Light Yellow    Clarity, UA Clear Clear    Glucose, UA Negative Negative    Bilirubin, UA Negative Negative    Ketones, UA Negative Negative    Specific Gravity, UA 1.015 1.005 - 1.030    Blood, UA Trace (!) Negative    pH, UA 7.5 5.0 - 8.0    Protein, UA Negative Negative mg/dL    Urobilinogen, UA 0.2 E.U./dL 0.2 E.U./dL, 1.0 E.U./dL    Nitrite, UA Negative Negative    Leukocytes, UA Negative Negative   HPV High Risk DNA Cervical   Result Value Ref Range    HPV Source Sure[facility]     HPV16 DNA Negative NEG    HPV18 DNA Negative NEG    Other HR HPV Negative NEG    Final Diagnosis SEE NOTES     Specimen Description Cervical Cells        ASSESSMENT: 58 y.o. female physical exam and pap smear.    PLAN:     Routine general medical examination at a health care facility [Z00.00]    1. Routine general medical examination at a health care facility  - Hemoglobin  - Urinalysis Macroscopic  - Lipid Cascade  - Gynecologic Cytology (PAP Smear)  - HPV High Risk DNA Cervical    2. Essential (primary) hypertension  - Lipid Cascade  - Comprehensive Metabolic Panel  - lisinopril-hydrochlorothiazide (PRINZIDE,ZESTORETIC) 20-25 mg per tablet; Take 1 tablet by mouth every morning.  Dispense: 90 tablet; Refill: 1  - lisinopril (PRINIVIL,ZESTRIL) 20 MG tablet; Take 1 tablet (20 mg total) by mouth daily.  Dispense: 90 tablet; Refill: 1      Patient is a 58 y.o. female who is overall doing well.  She continues on lisinopril/chlorothiazide in the morning and lisinopril in the evening for her blood pressure.  She seems to be tolerating this well.  Her blood pressure today looks excellent.  She denies that she is having any chest pain or shortness of breath.  She will continue  on both these medications.  Prescriptions for both of them were sent to the pharmacy today.  Labs are drawn including lipid panel and electrolyte panel to follow-up on her hypertensive medication.  Will contact her with results of those when they return.  She is otherwise feeling well.  She is trying to lose weight now and has been successful with a new soren on her phone and she is quite excited about that.  Asthma control test and asthma action plan were completed today.  All of her questions and concerns were addressed today.  If she has additional problems or concerns should let me know.    Will contact her with the results of the labs when available.  Janeth Billingsley M.D.

## 2021-07-06 ENCOUNTER — TRANSFERRED RECORDS (OUTPATIENT)
Dept: HEALTH INFORMATION MANAGEMENT | Facility: CLINIC | Age: 61
End: 2021-07-06

## 2021-07-16 ENCOUNTER — OFFICE VISIT (OUTPATIENT)
Dept: PEDIATRICS | Facility: CLINIC | Age: 61
End: 2021-07-16
Payer: COMMERCIAL

## 2021-07-16 VITALS
HEIGHT: 66 IN | RESPIRATION RATE: 16 BRPM | WEIGHT: 229 LBS | DIASTOLIC BLOOD PRESSURE: 72 MMHG | BODY MASS INDEX: 36.8 KG/M2 | SYSTOLIC BLOOD PRESSURE: 124 MMHG | TEMPERATURE: 97.6 F | HEART RATE: 77 BPM | OXYGEN SATURATION: 98 %

## 2021-07-16 DIAGNOSIS — T30.0 BURN: ICD-10-CM

## 2021-07-16 DIAGNOSIS — G56.01 CARPAL TUNNEL SYNDROME OF RIGHT WRIST: ICD-10-CM

## 2021-07-16 DIAGNOSIS — Z01.818 PREOP GENERAL PHYSICAL EXAM: Primary | ICD-10-CM

## 2021-07-16 DIAGNOSIS — I10 ESSENTIAL HYPERTENSION: ICD-10-CM

## 2021-07-16 DIAGNOSIS — E66.01 MORBID OBESITY (H): ICD-10-CM

## 2021-07-16 DIAGNOSIS — J45.20 MILD INTERMITTENT ASTHMA WITHOUT COMPLICATION: ICD-10-CM

## 2021-07-16 LAB
ANION GAP SERPL CALCULATED.3IONS-SCNC: 4 MMOL/L (ref 3–14)
BUN SERPL-MCNC: 14 MG/DL (ref 7–30)
CALCIUM SERPL-MCNC: 9.7 MG/DL (ref 8.5–10.1)
CHLORIDE BLD-SCNC: 105 MMOL/L (ref 94–109)
CO2 SERPL-SCNC: 29 MMOL/L (ref 20–32)
CREAT SERPL-MCNC: 0.67 MG/DL (ref 0.52–1.04)
GFR SERPL CREATININE-BSD FRML MDRD: >90 ML/MIN/1.73M2
GLUCOSE BLD-MCNC: 88 MG/DL (ref 70–99)
HGB BLD-MCNC: 12.8 G/DL (ref 11.7–15.7)
POTASSIUM BLD-SCNC: 3.7 MMOL/L (ref 3.4–5.3)
SODIUM SERPL-SCNC: 138 MMOL/L (ref 133–144)

## 2021-07-16 PROCEDURE — 99214 OFFICE O/P EST MOD 30 MIN: CPT | Performed by: NURSE PRACTITIONER

## 2021-07-16 PROCEDURE — 36415 COLL VENOUS BLD VENIPUNCTURE: CPT | Performed by: NURSE PRACTITIONER

## 2021-07-16 PROCEDURE — 80048 BASIC METABOLIC PNL TOTAL CA: CPT | Performed by: NURSE PRACTITIONER

## 2021-07-16 PROCEDURE — 85018 HEMOGLOBIN: CPT | Performed by: NURSE PRACTITIONER

## 2021-07-16 RX ORDER — MUPIROCIN 20 MG/G
OINTMENT TOPICAL 3 TIMES DAILY
Qty: 30 G | Refills: 0 | Status: SHIPPED | OUTPATIENT
Start: 2021-07-16 | End: 2022-05-16

## 2021-07-16 ASSESSMENT — MIFFLIN-ST. JEOR: SCORE: 1607.55

## 2021-07-16 NOTE — PROGRESS NOTES
[facility]  [address]  [address]  [place] [address]  Phone: [phone]  Fax: [phone]  Primary Provider: Jessica Medina Mai  Pre-op Performing Provider: SHAQ JAFFE    PREOPERATIVE EVALUATION:  Today's date: 7/16/2021    Maylin Ni is a 61 year old female who presents for a preoperative evaluation.    Surgical Information:  Surgery/Procedure: right hand carpal tunnel   Surgery Location: [facility]   Surgeon: Dr. Villaseñro   Surgery Date: 7/19/21  Time of Surgery: 930  Where patient plans to recover: At home with family  Fax number for surgical facility: [phone]    Type of Anesthesia Anticipated: to be determined    Assessment & Plan     The proposed surgical procedure is considered INTERMEDIATE risk.    Preop general physical exam  - Basic metabolic panel  (Ca, Cl, CO2, Creat, Gluc, K, Na, BUN); Future  - Hemoglobin; Future  - Basic metabolic panel  (Ca, Cl, CO2, Creat, Gluc, K, Na, BUN)  - Hemoglobin    Carpal tunnel syndrome of right wrist  Reason for procedure    Essential hypertension  stable  - Basic metabolic panel  (Ca, Cl, CO2, Creat, Gluc, K, Na, BUN); Future  - Hemoglobin; Future  - Basic metabolic panel  (Ca, Cl, CO2, Creat, Gluc, K, Na, BUN)  - Hemoglobin    Mild intermittent asthma without complication  Stable, rare albuterol use    Morbid obesity (H)  Discussed risks of post op complications     Burn  Located on forearm but should be higher than incision site, no signs of infection but discussed s/s to watch for and can start topicals if signs of any infection.  - mupirocin (BACTROBAN) 2 % external ointment; Apply topically 3 times daily     Risks and Recommendations:  The patient has the following additional risks and recommendations for perioperative complications:   - No identified additional risk factors other than previously addressed    Medication Instructions:  Patient is to take all scheduled medications on the day  of surgery    RECOMMENDATION:  APPROVAL GIVEN to proceed with proposed procedure, without further diagnostic evaluation.      Subjective     HPI related to upcoming procedure: hx of previous right wrist repair in 2019, now carpal tunnel issues    Preop Questions 7/9/2021   1. Have you ever had a heart attack or stroke? No   2. Have you ever had surgery on your heart or blood vessels, such as a stent placement, a coronary artery bypass, or surgery on an artery in your head, neck, heart, or legs? No   3. Do you have chest pain with activity? No   4. Do you have a history of  heart failure? No   5. Do you currently have a cold, bronchitis or symptoms of other infection? No   6. Do you have a cough, shortness of breath, or wheezing? No   7. Do you or anyone in your family have previous history of blood clots? No   8. Do you or does anyone in your family have a serious bleeding problem such as prolonged bleeding following surgeries or cuts? No   9. Have you ever had problems with anemia or been told to take iron pills? No   10. Have you had any abnormal blood loss such as black, tarry or bloody stools, or abnormal vaginal bleeding? No   11. Have you ever had a blood transfusion? No   12. Are you willing to have a blood transfusion if it is medically needed before, during, or after your surgery? YES    13. Have you or any of your relatives ever had problems with anesthesia? No   14. Do you have sleep apnea, excessive snoring or daytime drowsiness? No   15. Do you have any artifical heart valves or other implanted medical devices like a pacemaker, defibrillator, or continuous glucose monitor? No   16. Do you have artificial joints? No   17. Are you allergic to latex? No       Health Care Directive:  Patient does not have a Health Care Directive or Living Will: Discussed advance care planning with patient; information given to patient to review.    Preoperative Review of :   reviewed - no record of controlled  substances prescribed.    Status of Chronic Conditions:  ASTHMA - Patient has a longstanding history of moderate-severe Asthma . Patient has been doing well overall noting NO SYMPTOMS and continues on medication regimen consisting of prn albuterol (no use in years) without adverse reactions or side effects.     HYPERTENSION - Patient has longstanding history of HTN , currently denies any symptoms referable to elevated blood pressure. Specifically denies chest pain, palpitations, dyspnea, orthopnea, PND or peripheral edema. Blood pressure readings have been in normal range. Current medication regimen is as listed below. Patient denies any side effects of medication.     Review of Systems  Constitutional, neuro, ENT, endocrine, pulmonary, cardiac, gastrointestinal, genitourinary, musculoskeletal, integument and psychiatric systems are negative, except as otherwise noted.    Patient Active Problem List    Diagnosis Date Noted     Morbid obesity (H) 04/27/2021     Priority: Medium     Essential hypertension 09/15/2007     Priority: Medium     Asthma 09/15/2007     Priority: Medium      Past Medical History:   Diagnosis Date     Endometriosis      H/O colonoscopy 07/31/2020    nl, repeat 5 years for polyp surveillance     Hypertension      Hypertension      Normal delivery     x3, 1986, 1988, 1991     Tubular adenoma 4/2015    found on colonoscopy, repeat in 5 yrs     Unspecified intestinal obstruction 10/2012    small bowel obstruction, thought to be secondary to raw carrots and apples     Past Surgical History:   Procedure Laterality Date     APPENDECTOMY  1980    with R ovary, fallopian tube surgery     CHOLECYSTECTOMY  1997    Laparoscopic     hysterectomy N/A      HYSTERECTOMY  2012    has left ovary remaining     OOPHORECTOMY Right     1 removed, 1 remains     OTHER SURGICAL HISTORY Right 1980    oophorectomyR ovary and fallopian tube removed, endometriosis     OTHER SURGICAL HISTORY Right 08/2019    right [facility]     right [facility]       Current Outpatient Medications   Medication Sig Dispense Refill     albuterol (PROAIR HFA/PROVENTIL HFA/VENTOLIN HFA) 108 (90 Base) MCG/ACT inhaler Inhale 2 puffs into the lungs every 4 hours as needed for shortness of breath / dyspnea 1 Inhaler 0     IBUPROFEN        mupirocin (BACTROBAN) 2 % external ointment Apply topically 3 times daily 30 g 0     lisinopril-hydrochlorothiazide (ZESTORETIC) 20-12.5 MG tablet Take 2 tablets by mouth daily 180 tablet 3       Allergies   Allergen Reactions     Other Drug Allergy (See Comments) Unknown     Reacted to flu vaccine 10/2018        Social History     Tobacco Use     Smoking status: Never Smoker     Smokeless tobacco: Never Used   Substance Use Topics     Alcohol use: Yes     Family History   Problem Relation Age of Onset     Cancer Mother      Hypertension Father      Cerebrovascular Disease Father 59.00        carotid endarterectomy     Colon Cancer Mother         dx @ 64     Hypertension Mother      Osteopenia Mother      Lupus Mother      Heart Disease Father         MI, sudden cardiac death     Coronary Artery Disease Father      Uterine Cancer Sister         leiomyosarcoma     Breast Cancer Paternal Grandmother      History   Drug Use Unknown         Objective     /72 (BP Location: Right arm, Patient Position: Chair, Cuff Size: Adult Large)   Pulse 77   Temp 97.6  F (36.4  C) (Tympanic)   Resp 16   Ht 1.664 m (5' 5.5\")   Wt 103.9 kg (229 lb)   SpO2 98%   BMI 37.53 kg/m      Physical Exam    GENERAL APPEARANCE: healthy, alert and no distress     EYES: EOMI, PERRL     HENT: ear canals and TM's normal and nose and mouth without ulcers or lesions     NECK: no adenopathy, no asymmetry, masses, or scars and thyroid normal to palpation     RESP: lungs clear to auscultation - no rales, rhonchi or wheezes     CV: regular rates and rhythm, normal S1 S2, no S3 or S4 and no murmur, click " or rub     ABDOMEN:  soft, nontender, no HSM or masses and bowel sounds normal     MS: extremities normal- no gross deformities noted, no evidence of inflammation in joints, FROM in all extremities.     SKIN: small blister to right forearm, no drainage or surrounding erythema or swelling     NEURO: Normal strength and tone, sensory exam grossly normal, mentation intact and speech normal     PSYCH: mentation appears normal. and affect normal/bright     LYMPHATICS: No cervical adenopathy    Recent Labs   Lab Test 04/23/21  0827 08/14/20  0659 01/31/20  1106 07/22/19  0926   HGB  --   --  14.1 13.5    137 138 139   POTASSIUM 3.7 3.8 3.9 3.5   CR 0.67 0.68 0.65 0.67        Diagnostics:  Recent Results (from the past 24 hour(s))   Basic metabolic panel  (Ca, Cl, CO2, Creat, Gluc, K, Na, BUN)    Collection Time: 07/16/21  7:46 AM   Result Value Ref Range    Sodium 138 133 - 144 mmol/L    Potassium 3.7 3.4 - 5.3 mmol/L    Chloride 105 94 - 109 mmol/L    Carbon Dioxide (CO2) 29 20 - 32 mmol/L    Anion Gap 4 3 - 14 mmol/L    Urea Nitrogen 14 7 - 30 mg/dL    Creatinine 0.67 0.52 - 1.04 mg/dL    Calcium 9.7 8.5 - 10.1 mg/dL    Glucose 88 70 - 99 mg/dL    GFR Estimate >90 >60 mL/min/1.73m2   Hemoglobin    Collection Time: 07/16/21  7:46 AM   Result Value Ref Range    Hemoglobin 12.8 11.7 - 15.7 g/dL      No EKG required, no history of coronary heart disease, significant arrhythmia, peripheral arterial disease or other structural heart disease.    Revised Cardiac Risk Index (RCRI):  The patient has the following serious cardiovascular risks for perioperative complications:   - No serious cardiac risks = 0 points     RCRI Interpretation: 0 points: Class I (very low risk - 0.4% complication rate)           Signed Electronically by: Hiwot Murcia NP  Copy of this evaluation report is provided to requesting physician.

## 2021-07-22 ENCOUNTER — RECORDS - HEALTHEAST (OUTPATIENT)
Dept: MAMMOGRAPHY | Facility: CLINIC | Age: 61
End: 2021-07-22

## 2021-07-22 DIAGNOSIS — Z12.31 OTHER SCREENING MAMMOGRAM: ICD-10-CM

## 2021-09-04 ENCOUNTER — HEALTH MAINTENANCE LETTER (OUTPATIENT)
Age: 61
End: 2021-09-04

## 2022-03-18 NOTE — TELEPHONE ENCOUNTER
DIAGNOSIS: right hip injury ~5 weeks ago, some low back pain/ self ref/ no imaging   APPOINTMENT DATE: 4.26.22   NOTES STATUS DETAILS   MEDICATION LIST Internal

## 2022-03-31 ENCOUNTER — TRANSFERRED RECORDS (OUTPATIENT)
Dept: HEALTH INFORMATION MANAGEMENT | Facility: CLINIC | Age: 62
End: 2022-03-31
Payer: COMMERCIAL

## 2022-04-26 ENCOUNTER — PRE VISIT (OUTPATIENT)
Dept: ORTHOPEDICS | Facility: CLINIC | Age: 62
End: 2022-04-26

## 2022-04-27 DIAGNOSIS — I10 ESSENTIAL HYPERTENSION: ICD-10-CM

## 2022-04-28 RX ORDER — LISINOPRIL AND HYDROCHLOROTHIAZIDE 12.5; 2 MG/1; MG/1
2 TABLET ORAL DAILY
Qty: 180 TABLET | Refills: 0 | Status: SHIPPED | OUTPATIENT
Start: 2022-04-28 | End: 2022-05-16

## 2022-04-28 NOTE — TELEPHONE ENCOUNTER
Prescription approved per [facility] Refill Protocol.    Gris Clark RN on 4/28/2022 at 9:55 AM

## 2022-05-10 SDOH — ECONOMIC STABILITY: TRANSPORTATION INSECURITY
IN THE PAST 12 MONTHS, HAS LACK OF TRANSPORTATION KEPT YOU FROM MEETINGS, WORK, OR FROM GETTING THINGS NEEDED FOR DAILY LIVING?: NO

## 2022-05-10 SDOH — HEALTH STABILITY: PHYSICAL HEALTH: ON AVERAGE, HOW MANY MINUTES DO YOU ENGAGE IN EXERCISE AT THIS LEVEL?: 60 MIN

## 2022-05-10 SDOH — ECONOMIC STABILITY: TRANSPORTATION INSECURITY
IN THE PAST 12 MONTHS, HAS THE LACK OF TRANSPORTATION KEPT YOU FROM MEDICAL APPOINTMENTS OR FROM GETTING MEDICATIONS?: NO

## 2022-05-10 SDOH — ECONOMIC STABILITY: FOOD INSECURITY: WITHIN THE PAST 12 MONTHS, THE FOOD YOU BOUGHT JUST DIDN'T LAST AND YOU DIDN'T HAVE MONEY TO GET MORE.: NEVER TRUE

## 2022-05-10 SDOH — ECONOMIC STABILITY: FOOD INSECURITY: WITHIN THE PAST 12 MONTHS, YOU WORRIED THAT YOUR FOOD WOULD RUN OUT BEFORE YOU GOT MONEY TO BUY MORE.: NEVER TRUE

## 2022-05-10 SDOH — HEALTH STABILITY: PHYSICAL HEALTH: ON AVERAGE, HOW MANY DAYS PER WEEK DO YOU ENGAGE IN MODERATE TO STRENUOUS EXERCISE (LIKE A BRISK WALK)?: 5 DAYS

## 2022-05-10 SDOH — ECONOMIC STABILITY: INCOME INSECURITY: IN THE LAST 12 MONTHS, WAS THERE A TIME WHEN YOU WERE NOT ABLE TO PAY THE MORTGAGE OR RENT ON TIME?: NO

## 2022-05-10 SDOH — ECONOMIC STABILITY: INCOME INSECURITY: HOW HARD IS IT FOR YOU TO PAY FOR THE VERY BASICS LIKE FOOD, HOUSING, MEDICAL CARE, AND HEATING?: NOT HARD AT ALL

## 2022-05-10 ASSESSMENT — LIFESTYLE VARIABLES
HOW OFTEN DO YOU HAVE A DRINK CONTAINING ALCOHOL: 2-4 TIMES A MONTH
AUDIT-C TOTAL SCORE: 2
SKIP TO QUESTIONS 9-10: 1
HOW OFTEN DO YOU HAVE SIX OR MORE DRINKS ON ONE OCCASION: NEVER
HOW MANY STANDARD DRINKS CONTAINING ALCOHOL DO YOU HAVE ON A TYPICAL DAY: 1 OR 2

## 2022-05-10 ASSESSMENT — ENCOUNTER SYMPTOMS
WEAKNESS: 0
CHILLS: 0
NAUSEA: 0
NERVOUS/ANXIOUS: 0
JOINT SWELLING: 0
BREAST MASS: 0
ARTHRALGIAS: 0
EYE PAIN: 0
HEMATURIA: 0
CONSTIPATION: 0
COUGH: 0
DYSURIA: 0
MYALGIAS: 0
HEADACHES: 0
PARESTHESIAS: 0
FREQUENCY: 0
HEARTBURN: 0
FEVER: 0
SHORTNESS OF BREATH: 0
DIARRHEA: 0
PALPITATIONS: 0
ABDOMINAL PAIN: 0
SORE THROAT: 0
HEMATOCHEZIA: 0
DIZZINESS: 0

## 2022-05-10 ASSESSMENT — SOCIAL DETERMINANTS OF HEALTH (SDOH)
HOW OFTEN DO YOU GET TOGETHER WITH FRIENDS OR RELATIVES?: THREE TIMES A WEEK
IN A TYPICAL WEEK, HOW MANY TIMES DO YOU TALK ON THE PHONE WITH FAMILY, FRIENDS, OR NEIGHBORS?: MORE THAN THREE TIMES A WEEK
HOW OFTEN DO YOU ATTEND CHURCH OR RELIGIOUS SERVICES?: MORE THAN 4 TIMES PER YEAR
DO YOU BELONG TO ANY CLUBS OR ORGANIZATIONS SUCH AS CHURCH GROUPS UNIONS, FRATERNAL OR ATHLETIC GROUPS, OR SCHOOL GROUPS?: NO

## 2022-05-16 ENCOUNTER — ANCILLARY PROCEDURE (OUTPATIENT)
Dept: MAMMOGRAPHY | Facility: CLINIC | Age: 62
End: 2022-05-16
Attending: FAMILY MEDICINE
Payer: COMMERCIAL

## 2022-05-16 ENCOUNTER — OFFICE VISIT (OUTPATIENT)
Dept: PEDIATRICS | Facility: CLINIC | Age: 62
End: 2022-05-16
Payer: COMMERCIAL

## 2022-05-16 VITALS
HEIGHT: 66 IN | OXYGEN SATURATION: 99 % | RESPIRATION RATE: 18 BRPM | WEIGHT: 229 LBS | TEMPERATURE: 97.8 F | HEART RATE: 70 BPM | BODY MASS INDEX: 36.8 KG/M2 | DIASTOLIC BLOOD PRESSURE: 78 MMHG | SYSTOLIC BLOOD PRESSURE: 116 MMHG

## 2022-05-16 DIAGNOSIS — Z12.31 VISIT FOR SCREENING MAMMOGRAM: ICD-10-CM

## 2022-05-16 DIAGNOSIS — Z13.220 SCREENING FOR LIPID DISORDERS: ICD-10-CM

## 2022-05-16 DIAGNOSIS — I10 ESSENTIAL HYPERTENSION: ICD-10-CM

## 2022-05-16 DIAGNOSIS — Z00.00 ROUTINE GENERAL MEDICAL EXAMINATION AT A HEALTH CARE FACILITY: Primary | ICD-10-CM

## 2022-05-16 DIAGNOSIS — E66.01 MORBID OBESITY (H): ICD-10-CM

## 2022-05-16 LAB
ANION GAP SERPL CALCULATED.3IONS-SCNC: 3 MMOL/L (ref 3–14)
BUN SERPL-MCNC: 9 MG/DL (ref 7–30)
CALCIUM SERPL-MCNC: 9.4 MG/DL (ref 8.5–10.1)
CHLORIDE BLD-SCNC: 103 MMOL/L (ref 94–109)
CHOLEST SERPL-MCNC: 199 MG/DL
CO2 SERPL-SCNC: 31 MMOL/L (ref 20–32)
CREAT SERPL-MCNC: 0.62 MG/DL (ref 0.52–1.04)
FASTING STATUS PATIENT QL REPORTED: YES
GFR SERPL CREATININE-BSD FRML MDRD: >90 ML/MIN/1.73M2
GLUCOSE BLD-MCNC: 91 MG/DL (ref 70–99)
HDLC SERPL-MCNC: 76 MG/DL
LDLC SERPL CALC-MCNC: 109 MG/DL
NONHDLC SERPL-MCNC: 123 MG/DL
POTASSIUM BLD-SCNC: 3.7 MMOL/L (ref 3.4–5.3)
SODIUM SERPL-SCNC: 137 MMOL/L (ref 133–144)
TRIGL SERPL-MCNC: 72 MG/DL

## 2022-05-16 PROCEDURE — 80048 BASIC METABOLIC PNL TOTAL CA: CPT | Performed by: INTERNAL MEDICINE

## 2022-05-16 PROCEDURE — 80061 LIPID PANEL: CPT | Performed by: INTERNAL MEDICINE

## 2022-05-16 PROCEDURE — 99396 PREV VISIT EST AGE 40-64: CPT | Performed by: INTERNAL MEDICINE

## 2022-05-16 PROCEDURE — 36415 COLL VENOUS BLD VENIPUNCTURE: CPT | Performed by: INTERNAL MEDICINE

## 2022-05-16 PROCEDURE — 77067 SCR MAMMO BI INCL CAD: CPT | Mod: TC | Performed by: RADIOLOGY

## 2022-05-16 RX ORDER — LISINOPRIL AND HYDROCHLOROTHIAZIDE 12.5; 2 MG/1; MG/1
2 TABLET ORAL DAILY
Qty: 180 TABLET | Refills: 3 | Status: SHIPPED | OUTPATIENT
Start: 2022-05-16 | End: 2023-05-23

## 2022-05-16 ASSESSMENT — ENCOUNTER SYMPTOMS
HEARTBURN: 0
WEAKNESS: 0
BREAST MASS: 0
DIZZINESS: 0
COUGH: 0
ABDOMINAL PAIN: 0
DIARRHEA: 0
NERVOUS/ANXIOUS: 0
EYE PAIN: 0
DYSURIA: 0
CHILLS: 0
HEMATOCHEZIA: 0
FREQUENCY: 0
PALPITATIONS: 0
MYALGIAS: 0
HEMATURIA: 0
ARTHRALGIAS: 0
SHORTNESS OF BREATH: 0
JOINT SWELLING: 0
SORE THROAT: 0
PARESTHESIAS: 0
CONSTIPATION: 0
HEADACHES: 0
NAUSEA: 0
FEVER: 0

## 2022-05-16 ASSESSMENT — ASTHMA QUESTIONNAIRES: ACT_TOTALSCORE: 25

## 2022-05-16 ASSESSMENT — PAIN SCALES - GENERAL: PAINLEVEL: NO PAIN (0)

## 2022-05-16 NOTE — PROGRESS NOTES
SUBJECTIVE:   CC: Maylin Ni is an 61 year old woman who presents for preventive health visit.       Patient has been advised of split billing requirements and indicates understanding: Yes  Healthy Habits:     Getting at least 3 servings of Calcium per day:  Yes    Bi-annual eye exam:  Yes    Dental care twice a year:  Yes    Sleep apnea or symptoms of sleep apnea:  None    Diet:  Regular (no restrictions)    Frequency of exercise:  4-5 days/week    Duration of exercise:  45-60 minutes    Taking medications regularly:  Yes    Medication side effects:  None    PHQ-2 Total Score: 0    Additional concerns today:  No    ACT Total Scores 8/10/2020 5/16/2022   ACT TOTAL SCORE (Goal Greater than or Equal to 20) 25 25   In the past 12 months, how many times did you visit the emergency room for your asthma without being admitted to the hospital? 0 0   In the past 12 months, how many times were you hospitalized overnight because of your asthma? 0 0       Hypertension Follow-up      Do you check your blood pressure regularly outside of the clinic? No     Are you following a low salt diet? Yes    Are your blood pressures ever more than 140 on the top number (systolic) OR more   than 90 on the bottom number (diastolic), for example 140/90? Yes      Today's PHQ-2 Score:   PHQ-2 ( 1999 Pfizer) 5/10/2022   Q1: Little interest or pleasure in doing things 0   Q2: Feeling down, depressed or hopeless 0   PHQ-2 Score 0   PHQ-2 Total Score (12-17 Years)- Positive if 3 or more points; Administer PHQ-A if positive -   Q1: Little interest or pleasure in doing things Not at all   Q2: Feeling down, depressed or hopeless Not at all   PHQ-2 Score 0       Abuse: Current or Past (Physical, Sexual or Emotional) - No  Do you feel safe in your environment? Yes        Social History     Tobacco Use     Smoking status: Never Smoker     Smokeless tobacco: Never Used   Substance Use Topics     Alcohol use: Yes         Alcohol Use 5/10/2022    Prescreen: >3 drinks/day or >7 drinks/week? No   Prescreen: >3 drinks/day or >7 drinks/week? -       Reviewed orders with patient.  Reviewed health maintenance and updated orders accordingly - Yes  Lab work is in process    Breast Cancer Screening:    FHS-7:   Breast CA Risk Assessment (FHS-7) 4/26/2021 5/10/2022 5/16/2022   Did any of your first-degree relatives have breast or ovarian cancer? No No Yes   Did any of your relatives have bilateral breast cancer? Yes No No   Did any man in your family have breast cancer? No No No   Did any woman in your family have breast and ovarian cancer? No No No   Did any woman in your family have breast cancer before age 50 y? No No No   Do you have 2 or more relatives with breast and/or ovarian cancer? No No No   Do you have 2 or more relatives with breast and/or bowel cancer? Yes No Yes       Mammogram Screening: Recommended mammography every 1-2 years with patient discussion and risk factor consideration  Pertinent mammograms are reviewed under the imaging tab.    History of abnormal Pap smear: NO - age 30-65 PAP every 5 years with negative HPV co-testing recommended  PAP / HPV Latest Ref Rng & Units 1/29/2019   PAP Negative for squamous intraepithelial lesion or malignancy. Negative for squamous intraepithelial lesion or malignancy  Electronically signed by Sofya Gu CT (ASCP) on 2/6/2019 at  8:35 AM     HPV16 NEG Negative   HPV18 NEG Negative   HRHPV NEG Negative     Reviewed and updated as needed this visit by clinical staff   Tobacco  Allergies  Meds   Med Hx  Surg Hx  Fam Hx  Soc Hx          Reviewed and updated as needed this visit by Provider                       Review of Systems   Constitutional: Negative for chills and fever.   HENT: Negative for congestion, ear pain, hearing loss and sore throat.    Eyes: Negative for pain and visual disturbance.   Respiratory: Negative for cough and shortness of breath.    Cardiovascular: Negative for chest  "pain, palpitations and peripheral edema.   Gastrointestinal: Negative for abdominal pain, constipation, diarrhea, heartburn, hematochezia and nausea.   Breasts:  Negative for tenderness, breast mass and discharge.   Genitourinary: Negative for dysuria, frequency, genital sores, hematuria, pelvic pain, urgency, vaginal bleeding and vaginal discharge.   Musculoskeletal: Negative for arthralgias, joint swelling and myalgias.   Skin: Negative for rash.   Neurological: Negative for dizziness, weakness, headaches and paresthesias.   Psychiatric/Behavioral: Negative for mood changes. The patient is not nervous/anxious.           OBJECTIVE:   /78 (BP Location: Right arm, Patient Position: Sitting, Cuff Size: Adult Large)   Pulse 70   Temp 97.8  F (36.6  C) (Tympanic)   Resp 18   Ht 1.676 m (5' 6\")   Wt 103.9 kg (229 lb)   SpO2 99%   BMI 36.96 kg/m    Physical Exam  GENERAL: healthy, alert and no distress  EYES: Eyes grossly normal to inspection, PERRL and conjunctivae and sclerae normal  HENT: ear canals and TM's normal, nose and mouth without ulcers or lesions  NECK: no adenopathy, no asymmetry, masses, or scars and thyroid normal to palpation  RESP: lungs clear to auscultation - no rales, rhonchi or wheezes  CV: regular rate and rhythm, normal S1 S2, no S3 or S4, no murmur, click or rub, no peripheral edema and peripheral pulses strong  ABDOMEN: soft, nontender, no hepatosplenomegaly, no masses and bowel sounds normal  MS: no gross musculoskeletal defects noted, no edema  SKIN: no suspicious lesions or rashes  NEURO: Normal strength and tone, mentation intact and speech normal  PSYCH: mentation appears normal, affect normal/bright    Diagnostic Test Results:  Labs reviewed in Epic    ASSESSMENT/PLAN:       ICD-10-CM    1. Routine general medical examination at a health care facility  Z00.00    2. Essential hypertension   - Stable, no side effects with medications, refilled meds today   I10 " "lisinopril-hydrochlorothiazide (ZESTORETIC) 20-12.5 MG tablet     Basic metabolic panel  (Ca, Cl, CO2, Creat, Gluc, K, Na, BUN)   3. Screening for lipid disorders  Z13.220 Lipid panel reflex to direct LDL Fasting     4. BMI 37 - joined gym, doing cardio and weight training, doing weight watchers, not seeing results.  Plan: as it related to her blood pressure, would like to continue doing this for a while and will message me if she wants referral to [facility] to discuss wt loss meds vs referral to weight management clinic in [place].    Patient has been advised of split billing requirements and indicates understanding: No    COUNSELING:  Reviewed preventive health counseling, as reflected in patient instructions    Estimated body mass index is 36.96 kg/m  as calculated from the following:    Height as of this encounter: 1.676 m (5' 6\").    Weight as of this encounter: 103.9 kg (229 lb).    Weight management plan: Discussed healthy diet and exercise guidelines    She reports that she has never smoked. She has never used smokeless tobacco.      Counseling Resources:  ATP IV Guidelines  Pooled Cohorts Equation Calculator  Breast Cancer Risk Calculator  BRCA-Related Cancer Risk Assessment: FHS-7 Tool  FRAX Risk Assessment  ICSI Preventive Guidelines  Dietary Guidelines for Americans, 2010  USDA's MyPlate  ASA Prophylaxis  Lung CA Screening    Liang Medina MD  [facility]  "

## 2022-05-17 NOTE — RESULT ENCOUNTER NOTE
"  Dear Glo,    It was nice to see you the other day!    Your lab results are within normal limits.  Your LDL (aka \"bad\" cholesterol) is borderline elevated, but nothing that needs to be treated.  At this time, I do not recommend any changes to your current plan of care.    Please feel free to call with any questions.  Otherwise, we can discuss further at your next appointment.    Sincerely,    Liang Medina MD  "
None

## 2022-05-31 ENCOUNTER — HOSPITAL ENCOUNTER (EMERGENCY)
Facility: CLINIC | Age: 62
Discharge: HOME | End: 2022-05-31
Attending: STUDENT IN AN ORGANIZED HEALTH CARE EDUCATION/TRAINING PROGRAM | Admitting: STUDENT IN AN ORGANIZED HEALTH CARE EDUCATION/TRAINING PROGRAM
Payer: COMMERCIAL

## 2022-05-31 ENCOUNTER — APPOINTMENT (OUTPATIENT)
Dept: GENERAL RADIOLOGY | Facility: CLINIC | Age: 62
End: 2022-05-31
Attending: STUDENT IN AN ORGANIZED HEALTH CARE EDUCATION/TRAINING PROGRAM
Payer: COMMERCIAL

## 2022-05-31 VITALS
RESPIRATION RATE: 16 BRPM | OXYGEN SATURATION: 99 % | DIASTOLIC BLOOD PRESSURE: 85 MMHG | HEART RATE: 75 BPM | TEMPERATURE: 97.9 F | SYSTOLIC BLOOD PRESSURE: 163 MMHG

## 2022-05-31 DIAGNOSIS — J06.9 VIRAL URI WITH COUGH: Primary | ICD-10-CM

## 2022-05-31 DIAGNOSIS — J22 LOWER RESP. TRACT INFECTION: ICD-10-CM

## 2022-05-31 LAB
ALBUMIN SERPL-MCNC: 3.7 G/DL (ref 3.4–5)
ALP SERPL-CCNC: 78 U/L (ref 40–150)
ALT SERPL W P-5'-P-CCNC: 28 U/L (ref 0–50)
ANION GAP SERPL CALCULATED.3IONS-SCNC: 7 MMOL/L (ref 3–14)
AST SERPL W P-5'-P-CCNC: 21 U/L (ref 0–45)
BASOPHILS # BLD AUTO: 0.1 10E3/UL (ref 0–0.2)
BASOPHILS NFR BLD AUTO: 1 %
BILIRUB SERPL-MCNC: 0.4 MG/DL (ref 0.2–1.3)
BUN SERPL-MCNC: 7 MG/DL (ref 7–30)
CALCIUM SERPL-MCNC: 9.4 MG/DL (ref 8.5–10.1)
CHLORIDE BLD-SCNC: 103 MMOL/L (ref 94–109)
CO2 SERPL-SCNC: 27 MMOL/L (ref 20–32)
CREAT SERPL-MCNC: 0.52 MG/DL (ref 0.52–1.04)
EOSINOPHIL # BLD AUTO: 0.1 10E3/UL (ref 0–0.7)
EOSINOPHIL NFR BLD AUTO: 1 %
ERYTHROCYTE [DISTWIDTH] IN BLOOD BY AUTOMATED COUNT: 13.2 % (ref 10–15)
FLUAV RNA SPEC QL NAA+PROBE: NEGATIVE
FLUBV RNA RESP QL NAA+PROBE: NEGATIVE
GFR SERPL CREATININE-BSD FRML MDRD: >90 ML/MIN/1.73M2
GLUCOSE BLD-MCNC: 125 MG/DL (ref 70–99)
HCT VFR BLD AUTO: 39.4 % (ref 35–47)
HGB BLD-MCNC: 13.4 G/DL (ref 11.7–15.7)
IMM GRANULOCYTES # BLD: 0 10E3/UL
IMM GRANULOCYTES NFR BLD: 0 %
LYMPHOCYTES # BLD AUTO: 2.2 10E3/UL (ref 0.8–5.3)
LYMPHOCYTES NFR BLD AUTO: 30 %
MCH RBC QN AUTO: 28.8 PG (ref 26.5–33)
MCHC RBC AUTO-ENTMCNC: 34 G/DL (ref 31.5–36.5)
MCV RBC AUTO: 85 FL (ref 78–100)
MONOCYTES # BLD AUTO: 0.4 10E3/UL (ref 0–1.3)
MONOCYTES NFR BLD AUTO: 5 %
NEUTROPHILS # BLD AUTO: 4.5 10E3/UL (ref 1.6–8.3)
NEUTROPHILS NFR BLD AUTO: 63 %
NRBC # BLD AUTO: 0 10E3/UL
NRBC BLD AUTO-RTO: 0 /100
PLATELET # BLD AUTO: 441 10E3/UL (ref 150–450)
POTASSIUM BLD-SCNC: 3.5 MMOL/L (ref 3.4–5.3)
PROT SERPL-MCNC: 7.5 G/DL (ref 6.8–8.8)
RBC # BLD AUTO: 4.66 10E6/UL (ref 3.8–5.2)
RSV RNA SPEC NAA+PROBE: NEGATIVE
SARS-COV-2 RNA RESP QL NAA+PROBE: NEGATIVE
SODIUM SERPL-SCNC: 137 MMOL/L (ref 133–144)
WBC # BLD AUTO: 7.2 10E3/UL (ref 4–11)

## 2022-05-31 PROCEDURE — 71046 X-RAY EXAM CHEST 2 VIEWS: CPT

## 2022-05-31 PROCEDURE — 85025 COMPLETE CBC W/AUTO DIFF WBC: CPT | Performed by: STUDENT IN AN ORGANIZED HEALTH CARE EDUCATION/TRAINING PROGRAM

## 2022-05-31 PROCEDURE — C9803 HOPD COVID-19 SPEC COLLECT: HCPCS | Performed by: STUDENT IN AN ORGANIZED HEALTH CARE EDUCATION/TRAINING PROGRAM

## 2022-05-31 PROCEDURE — 80053 COMPREHEN METABOLIC PANEL: CPT | Performed by: STUDENT IN AN ORGANIZED HEALTH CARE EDUCATION/TRAINING PROGRAM

## 2022-05-31 PROCEDURE — 36415 COLL VENOUS BLD VENIPUNCTURE: CPT | Performed by: STUDENT IN AN ORGANIZED HEALTH CARE EDUCATION/TRAINING PROGRAM

## 2022-05-31 PROCEDURE — 99283 EMERGENCY DEPT VISIT LOW MDM: CPT | Performed by: STUDENT IN AN ORGANIZED HEALTH CARE EDUCATION/TRAINING PROGRAM

## 2022-05-31 PROCEDURE — 99284 EMERGENCY DEPT VISIT MOD MDM: CPT | Performed by: STUDENT IN AN ORGANIZED HEALTH CARE EDUCATION/TRAINING PROGRAM

## 2022-05-31 PROCEDURE — 87637 SARSCOV2&INF A&B&RSV AMP PRB: CPT | Performed by: STUDENT IN AN ORGANIZED HEALTH CARE EDUCATION/TRAINING PROGRAM

## 2022-05-31 RX ORDER — ALBUTEROL SULFATE 90 UG/1
2 AEROSOL, METERED RESPIRATORY (INHALATION) EVERY 4 HOURS PRN
Status: DISCONTINUED | OUTPATIENT
Start: 2022-05-31 | End: 2022-05-31 | Stop reason: HOSPADM

## 2022-05-31 RX ORDER — ALBUTEROL SULFATE 90 UG/1
2 AEROSOL, METERED RESPIRATORY (INHALATION) EVERY 4 HOURS PRN
Qty: 8 G | Refills: 1 | Status: SHIPPED | OUTPATIENT
Start: 2022-05-31 | End: 2023-05-23

## 2022-05-31 ASSESSMENT — ENCOUNTER SYMPTOMS
DIARRHEA: 0
SORE THROAT: 0
ABDOMINAL PAIN: 0
VOMITING: 0
SHORTNESS OF BREATH: 0
COUGH: 1
FEVER: 0
NAUSEA: 0

## 2022-05-31 NOTE — ED TRIAGE NOTES
Pt report congestion and productive cough since last Tuesday.        Triage Assessment     Row Name 05/31/22 1012       Triage Assessment (Adult)    Airway WDL WDL       Respiratory WDL    Respiratory WDL WDL       Skin Circulation/Temperature WDL    Skin Circulation/Temperature WDL WDL       Cardiac WDL    Cardiac WDL WDL       Peripheral/Neurovascular WDL    Peripheral Neurovascular WDL WDL       Cognitive/Neuro/Behavioral WDL    Cognitive/Neuro/Behavioral WDL WDL

## 2022-05-31 NOTE — ED PROVIDER NOTES
ED Provider Note  [facility]      History     Chief Complaint   Patient presents with     Cough     Pt report congestion and productive cough since last Tuesday.      The history is provided by the patient and medical records.     Maylin Ni is a 61 year old female with history of asthma and HTN presenting to the ED with 7 day history of congestion and cough. Patient reports that she began feeling ill last Tuesday (5/24) around 10 am while at work. She went home that night and swabbed herself for Covid which was negative. She continued to feel worse for the next few days. She Covid swabbed herself again x3, all of which were negative. She reports persistent cough productive of cloudy sputum and nasal congestion with green colored drainage. No sore throat. Coughing is associated with difficulty breathing, but no new or worsening shortness of breath. No chest pain or leg swelling. She feels as though her symptoms are all upper respiratory. She is out of her inhalers. She has been using sinus meds during the day and cough syrup to sleep at night. She has also been drinking lots of tea with honey. She feels warm, but has not been febrile. She is flu and Covid vaccinated x3. No nausea, vomiting, or diarrhea.     Past Medical History  Past Medical History:   Diagnosis Date     Endometriosis      H/O colonoscopy 07/31/2020    nl, repeat 5 years for polyp surveillance     Hypertension      Hypertension      Normal delivery     x3, 1986, 1988, 1991     Tubular adenoma 4/2015    found on colonoscopy, repeat in 5 yrs     Unspecified intestinal obstruction 10/2012    small bowel obstruction, thought to be secondary to raw carrots and apples     Past Surgical History:   Procedure Laterality Date     APPENDECTOMY  1980    with R ovary, fallopian tube surgery     CHOLECYSTECTOMY  1997    Laparoscopic     hysterectomy N/A      HYSTERECTOMY  2012    has left ovary remaining     OOPHORECTOMY Right      1 removed, 1 remains     OTHER SURGICAL HISTORY Right 1980    oophorectomyR ovary and fallopian tube removed, endometriosis     OTHER SURGICAL HISTORY Right [facility]2019    [facility]       albuterol (PROAIR HFA/PROVENTIL HFA/VENTOLIN HFA) 108 (90 Base) MCG/ACT inhaler  IBUPROFEN  lisinopril-hydrochlorothiazide (ZESTORETIC) 20-12.5 MG tablet      No Known Allergies  Family History  Family History   Problem Relation Age of Onset     Cancer Mother      Hypertension Father      Cerebrovascular Disease Father 59.00        carotid endarterectomy     Colon Cancer Mother         dx @ 64     Hypertension Mother      Osteopenia Mother      Lupus Mother      Heart Disease Father         MI, sudden cardiac death     Coronary Artery Disease Father      Uterine Cancer Sister         leiomyosarcoma     Breast Cancer Paternal Grandmother      Social History   Social History     Tobacco Use     Smoking status: Never Smoker     Smokeless tobacco: Never Used   Substance Use Topics     Alcohol use: Yes     Drug use: Never      Past medical history, past surgical history, medications, allergies, family history, and social history were reviewed with the patient. No additional pertinent items.       Review of Systems   Constitutional: Negative for fever.   HENT: Positive for congestion. Negative for sore throat.    Respiratory: Positive for cough (productive). Negative for shortness of breath.    Cardiovascular: Negative for chest pain.   Gastrointestinal: Negative for abdominal pain, diarrhea, nausea and vomiting.   All other systems reviewed and are negative.    A complete review of systems was performed with pertinent positives and negatives noted in the HPI, and all other systems negative.    Physical Exam   BP: (!) 163/85  Pulse: 75  Temp: 97.9  F (36.6  C)  Resp: 16  SpO2: 99 %  Physical Exam  Vitals and nursing note reviewed.   Constitutional:       Appearance:  She is not ill-appearing or toxic-appearing.   HENT:      Head: Normocephalic and atraumatic.      Right Ear: External ear normal.      Left Ear: External ear normal.      Nose: Nose normal.   Eyes:      Extraocular Movements: Extraocular movements intact.      Conjunctiva/sclera: Conjunctivae normal.   Cardiovascular:      Rate and Rhythm: Normal rate and regular rhythm.   Pulmonary:      Effort: Pulmonary effort is normal. No respiratory distress.      Breath sounds: Normal breath sounds. No wheezing or rales.   Abdominal:      General: There is no distension.      Palpations: Abdomen is soft.      Tenderness: There is no abdominal tenderness.   Musculoskeletal:         General: No deformity or signs of injury.      Cervical back: Neck supple. No rigidity.   Skin:     General: Skin is warm.      Findings: No rash.   Neurological:      General: No focal deficit present.      Mental Status: She is alert. Mental status is at baseline.   Psychiatric:         Mood and Affect: Mood normal.         Behavior: Behavior normal.       ED Course      Procedures       The medical record was reviewed and interpreted.  Current labs reviewed and interpreted.  Current images reviewed and interpreted: no acute cardiopulmonary disease.              Results for orders placed or performed during the hospital encounter of 05/31/22   Chest XR,  PA & LAT     Status: None    Narrative    CHEST TWO VIEWS May 31, 2022 10:47 AM     HISTORY: Shortness of breath, cough.    COMPARISON: None.       Impression    IMPRESSION: There are no acute infiltrates. The cardiac silhouette is  not enlarged. Pulmonary vasculature is unremarkable.    CASE COLLINS MD         SYSTEM ID:  [id number]   Comprehensive metabolic panel     Status: Abnormal   Result Value Ref Range    Sodium 137 133 - 144 mmol/L    Potassium 3.5 3.4 - 5.3 mmol/L    Chloride 103 94 - 109 mmol/L    Carbon Dioxide (CO2) 27 20 - 32 mmol/L    Anion Gap 7 3 - 14 mmol/L    Urea Nitrogen 7 7 -  30 mg/dL    Creatinine 0.52 0.52 - 1.04 mg/dL    Calcium 9.4 8.5 - 10.1 mg/dL    Glucose 125 (H) 70 - 99 mg/dL    Alkaline Phosphatase 78 40 - 150 U/L    AST 21 0 - 45 U/L    ALT 28 0 - 50 U/L    Protein Total 7.5 6.8 - 8.8 g/dL    Albumin 3.7 3.4 - 5.0 g/dL    Bilirubin Total 0.4 0.2 - 1.3 mg/dL    GFR Estimate >90 >60 mL/min/1.73m2   CBC with platelets and differential     Status: None   Result Value Ref Range    WBC Count 7.2 4.0 - 11.0 10e3/uL    RBC Count 4.66 3.80 - 5.20 10e6/uL    Hemoglobin 13.4 11.7 - 15.7 g/dL    Hematocrit 39.4 35.0 - 47.0 %    MCV 85 78 - 100 fL    MCH 28.8 26.5 - 33.0 pg    MCHC 34.0 31.5 - 36.5 g/dL    RDW 13.2 10.0 - 15.0 %    Platelet Count 441 150 - 450 10e3/uL    % Neutrophils 63 %    % Lymphocytes 30 %    % Monocytes 5 %    % Eosinophils 1 %    % Basophils 1 %    % Immature Granulocytes 0 %    NRBCs per 100 WBC 0 <1 /100    Absolute Neutrophils 4.5 1.6 - 8.3 10e3/uL    Absolute Lymphocytes 2.2 0.8 - 5.3 10e3/uL    Absolute Monocytes 0.4 0.0 - 1.3 10e3/uL    Absolute Eosinophils 0.1 0.0 - 0.7 10e3/uL    Absolute Basophils 0.1 0.0 - 0.2 10e3/uL    Absolute Immature Granulocytes 0.0 <=0.4 10e3/uL    Absolute NRBCs 0.0 10e3/uL   Symptomatic; Unknown Influenza A/B & SARS-CoV2 (COVID-19) Virus PCR Multiplex Nasopharyngeal     Status: Normal    Specimen: Nasopharyngeal; Swab   Result Value Ref Range    Influenza A PCR Negative Negative    Influenza B PCR Negative Negative    RSV PCR Negative Negative    SARS CoV2 PCR Negative Negative, Testing sent to reference lab. Results will be returned via unsolicited result    Narrative    Testing was performed using the Xpert Xpress CoV2/Flu/RSV Assay on the [email or web address] Instrument. This test should be ordered for the detection of SARS-CoV-2 and influenza viruses in individuals who meet clinical and/or epidemiological criteria. Test performance is unknown in asymptomatic patients. This test is for in vitro diagnostic use under the FDA EUA for  laboratories certified under CLIA to perform high or moderate complexity testing. This test has not been FDA cleared or approved. A negative result does not rule out the presence of PCR inhibitors in the specimen or target RNA in concentration below the limit of detection for the assay. If only one viral target is positive but coinfection with multiple targets is suspected, the sample should be re-tested with another FDA cleared, approved, or authorized test, if coinfection would change clinical management. This test was validated by the [facility]. These laboratories are certified under the Clinical  Laboratory Improvement Amendments of 1988 (CLIA-88) as qualified to perform high complexity laboratory testing.   CBC with platelets differential     Status: None    Narrative    The following orders were created for panel order CBC with platelets differential.  Procedure                               Abnormality         Status                     ---------                               -----------         ------                     CBC with platelets and d...[[id number]]                      Final result                 Please view results for these tests on the individual orders.     Medications - No data to display     Assessments & Plan (with Medical Decision Making)   MDM:    61 year old F with past medical history of hypertension who presents for long-term URI symptoms with productive cough.  Basic labs unremarkable, chest x-ray with no evidence of bacterial infiltrate.  Not hypoxic, tachycardic, no other clinical signs or symptoms to suggest ACS or PE.  Will recommend continued symptomatic treatment, she states that she is improved with albuterol in the past, so will order albuterol inhaler here and refill her previous prescription.  On reevaluation she is clinically unchanged.  I discussed all test results and the plan of care with the patient, who is agreeable to discharge with outpatient  follow-up.  Strict return precautions given all questions answered prior to discharge.    I have reviewed the nursing notes. I have reviewed the findings, diagnosis, plan and need for follow up with the patient.    Discharge Medication List as of 5/31/2022 11:34 AM          Final diagnoses:   Viral URI with cough   I, Bina Plata, am serving as a trained medical scribe to document services personally performed by Qiana Anderson MD, based on the provider's statements to me.     I, Qiana Anderson MD, was physically present and have reviewed and verified the accuracy of this note documented by Bina Plata.      --  Qiana Anderson MD  [facility] EMERGENCY DEPARTMENT  5/31/2022

## 2022-06-09 ENCOUNTER — MYC MEDICAL ADVICE (OUTPATIENT)
Dept: PEDIATRICS | Facility: CLINIC | Age: 62
End: 2022-06-09
Payer: COMMERCIAL

## 2022-06-09 DIAGNOSIS — E66.01 MORBID OBESITY (H): Primary | ICD-10-CM

## 2022-06-10 NOTE — TELEPHONE ENCOUNTER
Dr. Medina,    Please see  message and advise regarding weight medications    LOV: 5/16/22 Notes    4. BMI 37 - joined gym, doing cardio and weight training, doing weight watchers, not seeing results.  Plan: as it related to her blood pressure, would like to continue doing this for a while and will message me if she wants referral to [facility] to discuss wt loss meds vs referral to weight management clinic in [place].       MTM?    Thank you  Keisha Mcknight RN on 6/10/2022 at 8:57 AM

## 2022-06-22 NOTE — PROGRESS NOTES
Medication Therapy Management (MTM) Encounter    ASSESSMENT:                            Medication Adherence/Access: No issues identified    Obesity: Patient's BMI is >30 kg/m2 and has not met weight-loss goals with diet and exercise intervention alone which qualifies her for pharmacologic therapy. Patient would benefit from using a GLP-1 (Wegovy or Saxenda), Contrave (naltrexone/bupropion), or topiramate. Phentermine was taken previously and patient would prefer to try a different agent. Would benefit from starting Wegovy due to its increased weight loss efficacy and lower administration frequency compared to the others. If adverse effects are intolerable, re-trialing phentermine or trying Contrave or topiramate may be considered, being watchful of worsening hypertension for both medications.     Hypertension: Patient will benefit from monitoring blood pressure at home to ensure it is back at baseline since recent ED admission.    Asthma: Stable.    Pain: Stable.    Supplements: Stable.    PLAN:                            Patient to:  1. Start Wegovy 0.25 mg subcutaneous injection once weekly for weeks 1 through 4; then 0.5 mg once weekly for weeks 5 through 8. Continue titrating up if tolerated.  2. Continue exercising at gym for 30 minutes at least 5 days a week and eating a healthy low-carbohydrate diet.  3. Send at-home blood pressure reading via [employer].    -- Pharmacy unable to source Wegovy 0.25 mg dose, Saxenda 0.6 mg x 7 day, 1.2 mg x 7 days, then 1.8 mg x 14 days order placed as replacement GLP-1 option.    Follow-up: 1 month on 7/18/22 with Anastasia Urbina PharmD    SUBJECTIVE/OBJECTIVE:                          Maylin Ni is a 61 year old female called for an initial visit. She was referred to me from Liang Medina MD.      Reason for visit: Weight loss management    Allergies/ADRs: Reviewed in chart  Past Medical History: Reviewed in chart  Tobacco: She reports that she has never smoked. She has  "never used smokeless tobacco.  Alcohol: Less than 1 beverages / week  Caffeine: 2 cups of coffee in the morning  Social: Works as a nurse manager for 30 years  Activity: Uses the [employer] fitness center - 14 times per month, uses treadmill, bike, and strength exercises. Very active at home - mows the lawn, walks around in the yard.    Medication Adherence/Access:   Medication barriers: none.   The patient fills medications at [place]: YES. [facility]    Class II Obesity (BMI 35 to 39.9): Current medication(s) include: none. Previously went from 254 lbs to 205 lbs while on phentermine for 2 years, then returned to current weight after discontinuation. Patient tolerated well with no side effects and reports was effective. Her end weight goal is 205 lbs. Patient has not yet reached insurance deductable but has a Health Savings Account to help with cost.  Nutrition/Eating Habits: Patient reports she eats a well rounded diet with salads and chicken. Tries to minimize carbs the best she can.   Exercise/Activity: Patient reports she has some right hip bursitis which has made exercise difficult but this has improved. See above for Activity.  Failed medications include: Phentermine: 30mg daily    Wt Readings from Last 4 Encounters:   05/16/22 229 lb (103.9 kg)   07/16/21 229 lb (103.9 kg)   04/27/21 225 lb (102.1 kg)   01/31/20 199 lb (90.3 kg)     Estimated body mass index is 36.96 kg/m  as calculated from the following:    Height as of 5/16/22: 5' 6\" (1.676 m).    Weight as of 5/16/22: 229 lb (103.9 kg).    Hypertension: Current medications include lisinopril-hydrochlorothiazide 20-12.5mg tablets 2 tablets daily.  Patient does self-monitor blood pressure. Patient reports her last blood pressure reading was elevated due to ED admission that has since resolved. Patient will send an updated at-home blood pressure via [employer] soon.  BP Readings from Last 3 Encounters:   05/31/22 (!) 163/85   05/16/22 116/78   07/16/21 124/72 "     Potassium   Date Value Ref Range Status   05/31/2022 3.5 3.4 - 5.3 mmol/L Final   04/23/2021 3.7 3.4 - 5.3 mmol/L Final       Asthma: Current medications include albuterol inhaler 2 puffs every 4 hours as needed for shortness of breath or wheezing. No reported concerns or side effects at this time.  ACT Total Scores 8/10/2020 5/16/2022   ACT TOTAL SCORE (Goal Greater than or Equal to 20) 25 25   In the past 12 months, how many times did you visit the emergency room for your asthma without being admitted to the hospital? 0 0   In the past 12 months, how many times were you hospitalized overnight because of your asthma? 0 0     Pain: Currently taking ibuprofen as needed for right hip bursitis. Patient uses very rarely. No reported concerns or side effects at this time.    Supplements: Currently taking vitamin D 2000 units daily, multivitamin daily. No reported concerns or side effects at this time.      Today's Vitals: There were no vitals taken for this visit.  ----------------  Post Discharge Medication Reconciliation Status: discharge medications reconciled, continue medications without change.    I spent 39 minutes with this patient today. All changes were made via verbal approval with Liang Medina MD. A copy of the visit note was provided to the patient's provider(s).    The patient was sent via [employer] a summary of these recommendations.     Franny Person, PharmD4 Student    Yari GeorgesD  Medication Therapy Management Resident  Pager: [phone]    Preceptor cosignature: Maylin Ni was seen independently by Dr. Matthews. I have reviewed the assessment and plan. Loreto Sorensen, YariD, KIRILL, BCACP      Telemedicine Visit Details  Type of service:  Telephone visit  Start Time: 9:31 AM  End Time: 10:10 AM  Originating Location (patient location): Home  Distant Location (provider location):  [facility]     Medication Therapy Recommendations  Morbid obesity (H)     Rationale: Untreated condition - Needs additional medication therapy - Indication   Recommendation: Start Medication - Wegovy 0.25 MG/0.5ML Soaj - Start Wegovy 0.25 mg subcutaneous injection once weekly for weeks 1 through 4; then 0.5 mg once weekly for weeks 5 through 8. Continue titrating up if tolerated.   Status: Accepted per Provider

## 2022-06-23 ENCOUNTER — MYC MEDICAL ADVICE (OUTPATIENT)
Dept: PHARMACY | Facility: CLINIC | Age: 62
End: 2022-06-23

## 2022-06-23 ENCOUNTER — VIRTUAL VISIT (OUTPATIENT)
Dept: PHARMACY | Facility: CLINIC | Age: 62
End: 2022-06-23
Attending: INTERNAL MEDICINE
Payer: COMMERCIAL

## 2022-06-23 DIAGNOSIS — E66.9 OBESITY, UNSPECIFIED CLASSIFICATION, UNSPECIFIED OBESITY TYPE, UNSPECIFIED WHETHER SERIOUS COMORBIDITY PRESENT: Primary | ICD-10-CM

## 2022-06-23 DIAGNOSIS — J45.909 UNCOMPLICATED ASTHMA, UNSPECIFIED ASTHMA SEVERITY, UNSPECIFIED WHETHER PERSISTENT: ICD-10-CM

## 2022-06-23 DIAGNOSIS — Z78.9 TAKES DIETARY SUPPLEMENTS: ICD-10-CM

## 2022-06-23 DIAGNOSIS — I10 ESSENTIAL HYPERTENSION: ICD-10-CM

## 2022-06-23 DIAGNOSIS — R52 PAIN: ICD-10-CM

## 2022-06-23 PROCEDURE — 99605 MTMS BY PHARM NP 15 MIN: CPT

## 2022-06-23 PROCEDURE — 99607 MTMS BY PHARM ADDL 15 MIN: CPT

## 2022-06-23 RX ORDER — CHOLECALCIFEROL (VITAMIN D3) 50 MCG
1 TABLET ORAL DAILY
COMMUNITY
End: 2023-05-23

## 2022-06-23 RX ORDER — LIRAGLUTIDE 6 MG/ML
INJECTION, SOLUTION SUBCUTANEOUS
Qty: 3 PEN | Refills: 0 | Status: SHIPPED | OUTPATIENT
Start: 2022-06-23 | End: 2022-06-27

## 2022-06-23 RX ORDER — SEMAGLUTIDE 0.25 MG/.5ML
0.25 INJECTION, SOLUTION SUBCUTANEOUS WEEKLY
Qty: 2 ML | Refills: 0 | Status: SHIPPED | OUTPATIENT
Start: 2022-06-23 | End: 2022-06-23 | Stop reason: ALTCHOICE

## 2022-06-23 NOTE — TELEPHONE ENCOUNTER
Called patient to discuss remaining options. Due to previous success with phentermine plan to restart phentermine 15 mg daily with the goal of transitioning to Wegovy when it becomes available again. Discussed side effects including hypertension. Pt plans to monitor BP at home using her cuff and follow up with MTM for monitoring. Pended orders for PCP review and approval.    Danielle Matthews, PharmD  Medication Therapy Management Resident  Pager: [phone]

## 2022-06-23 NOTE — PATIENT INSTRUCTIONS
"Recommendations from today's MTM visit:                                                    MTM (medication therapy management) is a service provided by a clinical pharmacist designed to help you get the most of out of your medicines.   Today we reviewed what your medicines are for, how to know if they are working, that your medicines are safe and how to make your medicine regimen as easy as possible.      1. Start Wegovy 0.25 mg subcutaneous injection once weekly for weeks 1 through 4; then 0.5 mg once weekly for weeks 5 through 8. Continue titrating up if tolerated.  2. Continue exercising at gym for 30 minutes at least 5 days a week and eating a healthy low-carbohydrate diet.  3. Send at-home blood pressure reading via [employer].    Follow-up: Return in about 25 days (around 7/18/2022) for MTM Pharmacist Visit, using a phone visit.    It was great speaking with you today.  I value your experience and would be very thankful for your time in providing feedback in our clinic survey. In the next few days, you may receive an email or text message from [employer] with a link to a survey related to your  clinical pharmacist.\"     To schedule another MTM appointment, please call the clinic directly or you may call the MTM scheduling line at [phone] or toll-free at [phone].     My Clinical Pharmacist's contact information:                                                      Please feel free to contact me with any questions or concerns you have.      Danielle Matthews, Norma  Medication Therapy Management Resident  Pager: [phone]    Anastasia Urbina PharmD  Medication Therapy Management Practitioner  Pharmacist  Pager: [phone]    "

## 2022-06-24 ENCOUNTER — MYC MEDICAL ADVICE (OUTPATIENT)
Dept: PEDIATRICS | Facility: CLINIC | Age: 62
End: 2022-06-24

## 2022-06-27 RX ORDER — PHENTERMINE HYDROCHLORIDE 15 MG/1
15 CAPSULE ORAL EVERY MORNING
Qty: 90 CAPSULE | Refills: 0 | Status: SHIPPED | OUTPATIENT
Start: 2022-06-27 | End: 2022-07-18

## 2022-06-27 NOTE — TELEPHONE ENCOUNTER
Please see patient MyChart message as update. Routing to [facility] for follow up. Added patient reported BP to screenings.     Damian CASILLAS RN

## 2022-06-27 NOTE — TELEPHONE ENCOUNTER
Following up with patient as she also LVM on MTM pager regarding phentermine and blood pressure update. PCP did send Rx to the pharmacy today. I informed patient. I asked she check her blood pressure for our follow-up call too.    Anastasia Urbina, PharmD  Medication Therapy Management Pharmacist

## 2022-07-18 ENCOUNTER — VIRTUAL VISIT (OUTPATIENT)
Dept: PHARMACY | Facility: CLINIC | Age: 62
End: 2022-07-18
Payer: COMMERCIAL

## 2022-07-18 DIAGNOSIS — E66.9 OBESITY, UNSPECIFIED CLASSIFICATION, UNSPECIFIED OBESITY TYPE, UNSPECIFIED WHETHER SERIOUS COMORBIDITY PRESENT: Primary | ICD-10-CM

## 2022-07-18 DIAGNOSIS — E66.9 OBESITY, UNSPECIFIED CLASSIFICATION, UNSPECIFIED OBESITY TYPE, UNSPECIFIED WHETHER SERIOUS COMORBIDITY PRESENT: ICD-10-CM

## 2022-07-18 PROCEDURE — 99607 MTMS BY PHARM ADDL 15 MIN: CPT | Performed by: PHARMACIST

## 2022-07-18 PROCEDURE — 99606 MTMS BY PHARM EST 15 MIN: CPT | Performed by: PHARMACIST

## 2022-07-18 NOTE — PATIENT INSTRUCTIONS
"Recommendations from today's MTM visit:                                                      MTM to send request to PCP:  Increase phentermine to 15 mg before breakfast and 15 mg before lunch.   Patient to send mychart with blood pressure update in 2 weeks.    MTM check with pharmacy downstairs Wegovy still on shortage.    Follow-up: Return in about 3 months (around 10/11/2022) for Medication Therapy Management Visit.    It was great speaking with you today.  I value your experience and would be very thankful for your time in providing feedback in our clinic survey. In the next few days, you may receive an email or text message from [employer] with a link to a survey related to your  clinical pharmacist.\"     To schedule another MTM appointment, please call the clinic directly or you may call the MTM scheduling line at [phone] or toll-free at [phone].     My Clinical Pharmacist's contact information:                                                      Please feel free to contact me with any questions or concerns you have.      Anastasia Urbina, PharmD  Medication Therapy Management Pharmacist     "

## 2022-07-18 NOTE — TELEPHONE ENCOUNTER
MTM today to follow-up on weight management. Recommend dose increase. Patient will follow-up via blood pressure in 2 weeks. Patient's blood pressure check at work clinic have been < 130/80 HR 70-80's.    Pended for primary care provider review.    Anastasia Urbina, PharmD  Medication Therapy Management Pharmacist

## 2022-07-18 NOTE — PROGRESS NOTES
Medication Therapy Management (MTM) Encounter    ASSESSMENT:                            Medication Adherence/Access: No issues identified    Class II Obesity (BMI 35 to 39.9): Patient's may benefit from further increase in phentermine as she is tolerating and had had some benefit only.    PLAN:                            MTM to send request to PCP:  Increase phentermine to 15 mg before breakfast and 15 mg before lunch.   Patient to send mychart with blood pressure update in 2 weeks.    MTM check with pharmacy downstairs Wegovy still on shortage.    Follow-up: 10/11    SUBJECTIVE/OBJECTIVE:                          Maylin Ni is a 62 year old female called for a follow-up visit. She was referred to me from Liang Medina MD. Today is a follow-up from 6/23/2022 with Danielle Matthews PharmD.    Reason for visit: Weight loss management, follow-up.    Allergies/ADRs: Reviewed in chart  Past Medical History: Reviewed in chart  Tobacco: She reports that she has never smoked. She has never used smokeless tobacco.  Alcohol: Less than 1 beverages / week  Caffeine: 2 cups of coffee in the morning  Social: Works as a nurse manager for 30 years  Activity: Uses the [employer] fitness center - 14 times per month, uses treadmill, bike, and strength exercises. Very active at home - mows the lawn, walks around in the yard.    Medication Adherence/Access:   Medication barriers: none.   The patient fills medications at [place]: YES. [facility]    Class II Obesity (BMI 35 to 39.9): Current medication(s) include: phentermine 15 mg daily at around 9-10 am. She reports history of being on this and was taking twice daily of 37.5 mg dose each. This was managed by an outside clinic, no longer following.   Usually more hungry around mid-afternoon therefore tries to take phentermine later in the morning but also avoid taking after 3pm to avoid insomnia. Denies any side effects at this point. She does notice some appetite suppression but still  "notices some difficulty at times. She finds that she is not eating as much junk foods which is helpful.   She reports going to the gym and tries to monitor, today was the 8th day out of the month, gaol to be more consistent and 12 times a month for insurance benefit. Weight lifting at the gym and walking lot at work. She does feel better with going to the gym more. She had bursitis in Feb.  She has been checking blood pressure (she is able to check at work), she reports on Saturday was 128/70, HR 78 bpms.  Weight: Her end weight goal is 205 lbs. Fearful/discouraged to weigh self at this point.   Nutrition/Eating Habits: Patient reports she eats a well rounded diet with salads and chicken. Tries to minimize carbs the best she can.   Exercise/Activity: Patient reports she has some right hip bursitis which has made exercise difficult but this has improved. See above for Activity.  Failed medications include: Phentermine: 30mg daily, no side effects. Previously went from 254 lbs to 205 lbs while on phentermine for 2 years, then returned to current weight after discontinuation.    At initial MTM glp-1RA were discussed but not covered, she is wondering if the shortage will be resolved anytime.    Wt Readings from Last 4 Encounters:   05/16/22 229 lb (103.9 kg)   07/16/21 229 lb (103.9 kg)   04/27/21 225 lb (102.1 kg)   01/31/20 199 lb (90.3 kg)     Estimated body mass index is 36.96 kg/m  as calculated from the following:    Height as of 5/16/22: 5' 6\" (1.676 m).    Weight as of 5/16/22: 229 lb (103.9 kg).      Today's Vitals: There were no vitals taken for this visit.  ----------------      I spent 29 minutes with this patient today. All changes were made via cc'd chart Liang Medina MD. A copy of the visit note was provided to the patient's provider(s).    The patient was sent via [employer] a summary of these recommendations.     Anastasia Urbina, PharmD  Medication Therapy Management Pharmacist    Telemedicine Visit " Details  Type of service:  Telephone visit  Start Time: 2pm  End Time: 2:29pm  Originating Location (patient location): Home  Distant Location (provider location):  [facility]     Medication Therapy Recommendations  Obesity, unspecified classification, unspecified obesity type, unspecified whether serious comorbidity present    Current Medication: phentermine (ADIPEX-P) 15 MG capsule   Rationale: Dose too low - Dosage too low - Effectiveness   Recommendation: Increase Dose   Status: Contact Provider - Awaiting Response

## 2022-07-19 RX ORDER — PHENTERMINE HYDROCHLORIDE 15 MG/1
15 CAPSULE ORAL 2 TIMES DAILY
Qty: 180 CAPSULE | Refills: 0 | Status: SHIPPED | OUTPATIENT
Start: 2022-07-19 | End: 2022-10-11

## 2022-08-22 ENCOUNTER — TELEPHONE (OUTPATIENT)
Dept: PEDIATRICS | Facility: CLINIC | Age: 62
End: 2022-08-22

## 2022-08-22 DIAGNOSIS — E66.9 OBESITY, UNSPECIFIED CLASSIFICATION, UNSPECIFIED OBESITY TYPE, UNSPECIFIED WHETHER SERIOUS COMORBIDITY PRESENT: Primary | ICD-10-CM

## 2022-08-22 NOTE — TELEPHONE ENCOUNTER
Central Prior Authorization Team   Phone: [phone]    PA Initiation    Medication: Phentermine 15 mg  Insurance Company: [employer] - Phone [phone] Fax [phone]  Pharmacy Filling the Rx: [facility] - [address]   Filling Pharmacy Phone: [phone]  Filling Pharmacy Fax:    Start Date: 8/22/2022

## 2022-08-22 NOTE — TELEPHONE ENCOUNTER
Prior Authorization Retail Medication Request    Medication/Dose: Phentermine 15 mg  ICD code (if different than what is on RX):    Previously Tried and Failed:    Rationale:      Insurance Name:  [employer]  Insurance ID:  [id number]      Pharmacy Information (if different than what is on RX)  Name:  [facility]  Phone:  [phone]    Max of 1 capsule daily.    Thank you,  Geovanna Hernandez CPhT  [facility]

## 2022-08-23 RX ORDER — PHENTERMINE HYDROCHLORIDE 30 MG/1
30 CAPSULE ORAL EVERY MORNING
Qty: 90 CAPSULE | Refills: 0 | Status: SHIPPED | OUTPATIENT
Start: 2022-08-23 | End: 2022-10-11

## 2022-08-23 NOTE — TELEPHONE ENCOUNTER
PRIOR AUTHORIZATION DENIED    Medication: Phentermine 15 mg    Denial Date: 8/23/2022    Denial Rational:              Appeal Information:    If you would like to appeal, please supply P/A team with a letter of medical necessity with clinical reason.

## 2022-09-06 ENCOUNTER — MYC MEDICAL ADVICE (OUTPATIENT)
Dept: PEDIATRICS | Facility: CLINIC | Age: 62
End: 2022-09-06

## 2022-09-06 ENCOUNTER — OFFICE VISIT (OUTPATIENT)
Dept: OPTOMETRY | Facility: CLINIC | Age: 62
End: 2022-09-06
Payer: COMMERCIAL

## 2022-09-06 DIAGNOSIS — H52.203 MYOPIA OF BOTH EYES WITH ASTIGMATISM: Primary | ICD-10-CM

## 2022-09-06 DIAGNOSIS — H02.889 MEIBOMIAN GLAND DYSFUNCTION: ICD-10-CM

## 2022-09-06 DIAGNOSIS — H52.4 PRESBYOPIA: ICD-10-CM

## 2022-09-06 DIAGNOSIS — H52.13 MYOPIA OF BOTH EYES WITH ASTIGMATISM: Primary | ICD-10-CM

## 2022-09-06 PROCEDURE — 92015 DETERMINE REFRACTIVE STATE: CPT | Performed by: OPTOMETRIST

## 2022-09-06 PROCEDURE — 92014 COMPRE OPH EXAM EST PT 1/>: CPT | Performed by: OPTOMETRIST

## 2022-09-06 ASSESSMENT — REFRACTION_MANIFEST
OS_AXIS: 018
OD_CYLINDER: +0.25
OD_ADD: +2.25
METHOD_AUTOREFRACTION: 1
OD_CYLINDER: +0.25
OS_SPHERE: -1.00
OS_AXIS: 010
OD_AXIS: 178
OS_ADD: +2.25
OD_SPHERE: -1.00
OD_SPHERE: -1.50
OD_AXIS: 169
OS_SPHERE: -1.50
OS_CYLINDER: +0.75
OS_CYLINDER: +0.75

## 2022-09-06 ASSESSMENT — CONF VISUAL FIELD
OD_NORMAL: 1
METHOD: COUNTING FINGERS
OS_NORMAL: 1

## 2022-09-06 ASSESSMENT — REFRACTION_WEARINGRX
OD_CYLINDER: +0.50
OS_ADD: +2.25
OD_SPHERE: -1.00
OS_CYLINDER: +0.50
OS_AXIS: 003
OD_ADD: +2.25
OD_AXIS: 180
OS_SPHERE: -1.00
SPECS_TYPE: PAL

## 2022-09-06 ASSESSMENT — VISUAL ACUITY
CORRECTION_TYPE: GLASSES
OS_CC+: -1
OD_SC: 20/200
OS_CC: 20/20
OS_CC: 20/25
OD_CC+: -2
METHOD: SNELLEN - LINEAR
OD_CC: 20/20-1
OS_SC: 20/200
OD_CC: 20/20

## 2022-09-06 ASSESSMENT — KERATOMETRY
OS_K2POWER_DIOPTERS: 44.37
OS_AXISANGLE_DEGREES: 50
OD_AXISANGLE_DEGREES: 93
METHOD_AUTO_MANUAL: AUTOMATED
OD_AXISANGLE2_DEGREES: 3
OD_K2POWER_DIOPTERS: 44.12
OS_K1POWER_DIOPTERS: 42.87
OD_K1POWER_DIOPTERS: 43
OS_AXISANGLE2_DEGREES: 140

## 2022-09-06 ASSESSMENT — TONOMETRY
IOP_METHOD: APPLANATION
OS_IOP_MMHG: 17
OD_IOP_MMHG: 16

## 2022-09-06 ASSESSMENT — EXTERNAL EXAM - RIGHT EYE: OD_EXAM: NORMAL

## 2022-09-06 ASSESSMENT — EXTERNAL EXAM - LEFT EYE: OS_EXAM: NORMAL

## 2022-09-06 ASSESSMENT — CUP TO DISC RATIO
OS_RATIO: 0.3
OD_RATIO: 0.3

## 2022-09-06 NOTE — LETTER
9/6/2022         RE: Maylin Ni  [address] [place] [address] [place] [address]        Dear Colleague,    Thank you for referring your patient, Maylin Ni, to the [facility]. Please see a copy of my visit note below.    Chief Complaint   Patient presents with     Annual Eye Exam        Last Eye Exam: 06/25/2020  Dilated Previously: Yes, side effects of dilation explained today    What are you currently using to see?  glasses       Distance Vision Acuity: Satisfied with vision    Near Vision Acuity: Satisfied with vision while reading and using computer with glasses    Eye Comfort: good  Do you use eye drops? : No      Geovanna Mcclendon - Optometric Assistant           Medical, surgical and family histories reviewed and updated 9/6/2022.       OBJECTIVE: See Ophthalmology exam    ASSESSMENT:    ICD-10-CM    1. Myopia of both eyes with astigmatism  H52.13     H52.203    2. Presbyopia  H52.4    3. Meibomian gland dysfunction  H02.889        PLAN:   Update prescription     Hiwot Ward OD         Again, thank you for allowing me to participate in the care of your patient.        Sincerely,        Hiwot Ward, OD

## 2022-09-06 NOTE — PATIENT INSTRUCTIONS
Very little prescription change    Meibomian gland dysfunction or Posterior Blepharitis, is characterized by inflammation along both the uppper and lower eyelid margins. A single row of these glands is present in each lid with openings along the lid margins.  It is often found in association with skin conditions such as rosacea and seborrheic dermatitis.    Symptoms include:  ?Red eyes  ?Gritty or burning sensation  ?Excessive tearing  ?Itchy eyelids  ?Red, swollen eyelids  ?Crusting or matting of eyelashes in the morning  ?Light sensitivity  ?Blurred vision    It is important to keep cosmetics from blocking these oil glands. If blocked, they do not   excrete oil into the tear film, which causes the tears to evaporate quickly.   This may result in watery eyes.  There is also an increase of bacterial growth when the tear film is unstable, leading to further ocular surface inflammation.    Treatment:  1. Warm compresses for 5-10 minutes twice daily     2.  Keep the eyelid margins clean by using a commercial eye scrub or mild baby shampoo on a washcloth 1-2x daily    3. Use preservative free artificial tears 4-8x daily     For warm compresses    Moisten a washcloth with hot water, or microwave for 10 seconds, being careful to not get the cloth too hot.   Then put the washcloth onto your eyelids for 5 minutes. It will cool quickly so a rice pack or eyemask that can be heated and laid on top of the washcloth will help retain the heat.    Omega 3 fatty acid supplements taken once to twice daily and artificial tears such as Soothe xp, Refresh optive , Retaine and systane balance are also an additional treatment to control inflammation and help soothe your eyes.

## 2022-09-06 NOTE — PROGRESS NOTES
Chief Complaint   Patient presents with     Annual Eye Exam        Last Eye Exam: 06/25/2020  Dilated Previously: Yes, side effects of dilation explained today    What are you currently using to see?  glasses       Distance Vision Acuity: Satisfied with vision    Near Vision Acuity: Satisfied with vision while reading and using computer with glasses    Eye Comfort: good  Do you use eye drops? : No      Geovanna Mcclendon - Optometric Assistant           Medical, surgical and family histories reviewed and updated 9/6/2022.       OBJECTIVE: See Ophthalmology exam    ASSESSMENT:    ICD-10-CM    1. Myopia of both eyes with astigmatism  H52.13     H52.203    2. Presbyopia  H52.4    3. Meibomian gland dysfunction  H02.889        PLAN:   Update prescription     Hiwot Ward OD

## 2022-10-05 ENCOUNTER — ALLIED HEALTH/NURSE VISIT (OUTPATIENT)
Dept: PEDIATRICS | Facility: CLINIC | Age: 62
End: 2022-10-05
Payer: COMMERCIAL

## 2022-10-05 VITALS — SYSTOLIC BLOOD PRESSURE: 128 MMHG | DIASTOLIC BLOOD PRESSURE: 78 MMHG

## 2022-10-05 DIAGNOSIS — Z01.30 BP CHECK: Primary | ICD-10-CM

## 2022-10-05 PROCEDURE — 99207 PR NO CHARGE NURSE ONLY: CPT | Performed by: INTERNAL MEDICINE

## 2022-10-05 NOTE — PROGRESS NOTES
Maylin Ni was evaluated at [facility] on October 5, 2022 at which time her blood pressure was:    BP Readings from Last 3 Encounters:   10/05/22 128/78   05/31/22 (!) 163/85   05/16/22 116/78     Pulse Readings from Last 3 Encounters:   05/31/22 75   05/16/22 70   07/16/21 77       Reviewed lifestyle modifications for blood pressure control and reduction: including making healthy food choices, managing weight, getting regular exercise, smoking cessation, reducing alcohol consumption, monitoring blood pressure regularly.     Symptoms: None    BP Goal:< 140/90 mmHg    BP Assessment:  BP at goal    Potential Reasons for BP too high: NA - Not applicable    BP Follow-Up Plan: Recheck BP in 6 months at pharmacy    Recommendation to Provider: pt in pharmacy for vaccine and BP check. Currently on phentermine and needed BP check for that. Reports tolerating medication fine and continuing with lifestyle monification.     Note completed by:     Arianne Heath, Norma  [facility]  [phone]

## 2022-10-11 ENCOUNTER — OFFICE VISIT (OUTPATIENT)
Dept: PHARMACY | Facility: CLINIC | Age: 62
End: 2022-10-11
Payer: COMMERCIAL

## 2022-10-11 VITALS
OXYGEN SATURATION: 97 % | SYSTOLIC BLOOD PRESSURE: 124 MMHG | DIASTOLIC BLOOD PRESSURE: 78 MMHG | WEIGHT: 228 LBS | BODY MASS INDEX: 36.8 KG/M2 | HEART RATE: 87 BPM

## 2022-10-11 DIAGNOSIS — E66.9 OBESITY, UNSPECIFIED CLASSIFICATION, UNSPECIFIED OBESITY TYPE, UNSPECIFIED WHETHER SERIOUS COMORBIDITY PRESENT: Primary | ICD-10-CM

## 2022-10-11 PROCEDURE — 99606 MTMS BY PHARM EST 15 MIN: CPT | Performed by: PHARMACIST

## 2022-10-11 PROCEDURE — 99607 MTMS BY PHARM ADDL 15 MIN: CPT | Performed by: PHARMACIST

## 2022-10-11 NOTE — LETTER
My Asthma Action Plan    Name: Maylin Ni   YOB: 1960  Date: 10/11/2022   My doctor: Anastasia Urbina RPH   My clinic: [facility]        My Rescue Medicine:   Albuterol inhaler (Proair/Ventolin/Proventil HFA)  2-4 puffs EVERY 4 HOURS as needed. Use a spacer if recommended by your provider.   My Asthma Severity:   Intermittent / Exercise Induced  Know your asthma triggers: Patient is unaware of triggers             GREEN ZONE   Good Control    I feel good    No cough or wheeze    Can work, sleep and play without asthma symptoms       Take your asthma control medicine every day.     1. If exercise triggers your asthma, take your rescue medication    15 minutes before exercise or sports, and    During exercise if you have asthma symptoms  2. Spacer to use with inhaler: If you have a spacer, make sure to use it with your inhaler             YELLOW ZONE Getting Worse  I have ANY of these:    I do not feel good    Cough or wheeze    Chest feels tight    Wake up at night   1. Keep taking your Green Zone medications  2. Start taking your rescue medicine:    every 20 minutes for up to 1 hour. Then every 4 hours for 24-48 hours.  3. If you stay in the Yellow Zone for more than 12-24 hours, contact your doctor.  4. If you do not return to the Green Zone in 12-24 hours or you get worse, start taking your oral steroid medicine if prescribed by your provider.           RED ZONE Medical Alert - Get Help  I have ANY of these:    I feel awful    Medicine is not helping    Breathing getting harder    Trouble walking or talking    Nose opens wide to breathe       1. Take your rescue medicine NOW  2. If your provider has prescribed an oral steroid medicine, start taking it NOW  3. Call your doctor NOW  4. If you are still in the Red Zone after 20 minutes and you have not reached your doctor:    Take your rescue medicine again and    Call 911 or go to the emergency room right away    See your  regular doctor within 2 weeks of an Emergency Room or Urgent Care visit for follow-up treatment.          Annual Reminders:  Meet with Asthma Educator,  Flu Shot in the Fall, consider Pneumonia Vaccination for patients with asthma (aged 19 and older).    Pharmacy:    [facility] - [place], [place] - [address] [facility] [place] [address]     Electronically signed by Anastasia Urbina RPH   Date: 10/11/22                    Asthma Triggers  How To Control Things That Make Your Asthma Worse    Triggers are things that make your asthma worse.  Look at the list below to help you find your triggers and   what you can do about them. You can help prevent asthma flare-ups by staying away from your triggers.      Trigger                                                          What you can do   Cigarette Smoke  Tobacco smoke can make asthma worse. Do not allow smoking in your home, car or around you.  Be sure no one smokes at a child s day care or school.  If you smoke, ask your health care provider for ways to help you quit.  Ask family members to quit too.  Ask your health care provider for a referral to Quit Plan to help you quit smoking, or call 0-429-350-PLAN.     Colds, Flu, Bronchitis  These are common triggers of asthma. Wash your hands often.  Don t touch your eyes, nose or mouth.  Get a flu shot every year.     Dust Mites  These are tiny bugs that live in cloth or carpet. They are too small to see. Wash sheets and blankets in hot water every week.   Encase pillows and mattress in dust mite proof covers.  Avoid having carpet if you can. If you have carpet, vacuum weekly.   Use a dust mask and HEPA vacuum.   Pollen and Outdoor Mold  Some people are allergic to trees, grass, or weed pollen, or molds. Try to keep your windows closed.  Limit time out doors when pollen count is high.   Ask you health care provider about taking medicine during allergy  season.     Animal Dander  Some people are allergic to skin flakes, urine or saliva from pets with fur or feathers. Keep pets with fur or feathers out of your home.    If you can t keep the pet outdoors, then keep the pet out of your bedroom.  Keep the bedroom door closed.  Keep pets off cloth furniture and away from stuffed toys.     Mice, Rats, and Cockroaches  Some people are allergic to the waste from these pests.   Cover food and garbage.  Clean up spills and food crumbs.  Store grease in the refrigerator.   Keep food out of the bedroom.   Indoor Mold  This can be a trigger if your home has high moisture. Fix leaking faucets, pipes, or other sources of water.   Clean moldy surfaces.  Dehumidify basement if it is damp and smelly.   Smoke, Strong Odors, and Sprays  These can reduce air quality. Stay away from strong odors and sprays, such as perfume, powder, hair spray, paints, smoke incense, paint, cleaning products, candles and new carpet.   Exercise or Sports  Some people with asthma have this trigger. Be active!  Ask your doctor about taking medicine before sports or exercise to prevent symptoms.    Warm up for 5-10 minutes before and after sports or exercise.     Other Triggers of Asthma  Cold air:  Cover your nose and mouth with a scarf.  Sometimes laughing or crying can be a trigger.  Some medicines and food can trigger asthma.

## 2022-10-11 NOTE — PATIENT INSTRUCTIONS
"Recommendations from today's MTM visit:                                                      Start Saxenda 0.6 mg daily for 1 week, then 1.2 mg daily for 1 week, then 1.8 mg daily for 1 week, then 2.4 mg daily for a week, then up to 3 mg daily. IF you have side effects do not increase stay on the lower dose until improved before sleeping.     Decrease phentermine to 15 mg daily for 2 weeks then stop.     Follow-up: 1 month    It was great speaking with you today.  I value your experience and would be very thankful for your time in providing feedback in our clinic survey. In the next few days, you may receive an email or text message from [employer] with a link to a survey related to your  clinical pharmacist.\"     To schedule another MTM appointment, please call the clinic directly or you may call the MTM scheduling line at [phone] or toll-free at [phone].     My Clinical Pharmacist's contact information:                                                      Please feel free to contact me with any questions or concerns you have.      Anastasia Urbina, PharmD  Medication Therapy Management Pharmacist     "

## 2022-10-11 NOTE — PROGRESS NOTES
Medication Therapy Management (MTM) Encounter    ASSESSMENT:                            Medication Adherence/Access: No issues identified    Class II Obesity (BMI 35 to 39.9): discussed trial changing phentermine to GL1-RA agent given unclear the benefit phentermine has had so far. Her weight is < 5% different since starting therapy.     PLAN:                            With patient, I helped sign her up for Saxenda savings card and I gave this to the pharmacy.    Patient to decrease phentermine to 15mg daily and then stop.    Patient to start Saxenda 0.6mg daily titration weekly but 0.6 mg up to 3mg day maintenance dose. Discussed safety concerns, side effects to monitor for and how to titrate dose if having side effects. Discussed storage and demonstrated administration.    Follow-up: Return in about 5 weeks (around 11/15/2022) for Medication Therapy Management Visit.    SUBJECTIVE/OBJECTIVE:                          Maylin Ni is a 62 year old female coming in for a follow-up visit.  Today's visit is a follow-up MTM visit from 7/18/2022.     Reason for visit: weight management.    Allergies/ADRs: Reviewed in chart  Past Medical History: Reviewed in chart  Tobacco: She reports that she has never smoked. She has never used smokeless tobacco.  Alcohol: Less than 1 beverages / week  Social history: Works as a nurse manager for 30 years    Medication Adherence/Access:   Medication barriers: none.   The patient fills medications at [place]: YES. [place]    Class II Obesity (BMI 35 to 39.9): Current medication(s) include: phentermine 30 mg daily. She is trying to maintain smaller portions however she feels she is able to focus on smaller portions and avoid eating eating. She does think the psychologically she is able to control this. She is not sure how much the phentermine does or does not do.   She is going 12-14 times to the [facility] to exercise - strength training and treadmill.   She reports history  "of 250 lbs down to 205 lbs when she worked with a weight clinic in the past.    Her current dietary program goes by a point system and allows ~ 20's points per day and extra 28 points as needed for week. She does like this program and finds it helpful.  Although her weight by lbs had not come down she has felt her body become more lean/toned.  Usually more hungry around mid-afternoon therefore tries to take phentermine later in the morning but also avoid taking after 3pm to avoid insomnia. Denies any side effects at this point. She does notice some appetite suppression but still notices some difficulty at times. She finds that she is not eating as much junk foods which is helpful.   Weight: Her end weight goal is 210 lbs. However she really wants to work on getting ride of the fatty tissue in her mid section is her primary focus.  Failed medications include: Phentermine: 30mg daily, no side effects.     Wt Readings from Last 4 Encounters:   10/11/22 228 lb (103.4 kg)   05/16/22 229 lb (103.9 kg)   07/16/21 229 lb (103.9 kg)   04/27/21 225 lb (102.1 kg)     Estimated body mass index is 36.8 kg/m  as calculated from the following:    Height as of 5/16/22: 5' 6\" (1.676 m).    Weight as of this encounter: 228 lb (103.4 kg).    BP Readings from Last 3 Encounters:   10/11/22 124/78   10/05/22 128/78   05/31/22 (!) 163/85       Today's Vitals: /78   Pulse 87   Wt 228 lb (103.4 kg)   SpO2 97%   BMI 36.80 kg/m    ----------------      I spent 37 minutes with this patient today. All changes were made via collaborative practice agreement with Liang Medina MD. A copy of the visit note was provided to the patient's provider(s).    The patient was given a summary of these recommendations.     Anastasia Urbina, Norma  Medication Therapy Management Pharmacist     Medication Therapy Recommendations  Obesity, unspecified classification, unspecified obesity type, unspecified whether serious comorbidity present    Current " Medication: liraglutide - Weight Management (SAXENDA) 18 MG/3ML pen   Rationale: More effective medication available - Ineffective medication - Effectiveness   Recommendation: Change Medication - PHENTERMINE HCL PO   Status: Accepted per CPA

## 2022-11-15 ENCOUNTER — OFFICE VISIT (OUTPATIENT)
Dept: PHARMACY | Facility: CLINIC | Age: 62
End: 2022-11-15
Payer: COMMERCIAL

## 2022-11-15 VITALS
BODY MASS INDEX: 35.98 KG/M2 | DIASTOLIC BLOOD PRESSURE: 76 MMHG | HEART RATE: 105 BPM | SYSTOLIC BLOOD PRESSURE: 108 MMHG | OXYGEN SATURATION: 100 % | WEIGHT: 222.9 LBS

## 2022-11-15 DIAGNOSIS — E66.9 OBESITY, UNSPECIFIED CLASSIFICATION, UNSPECIFIED OBESITY TYPE, UNSPECIFIED WHETHER SERIOUS COMORBIDITY PRESENT: ICD-10-CM

## 2022-11-15 DIAGNOSIS — I10 ESSENTIAL HYPERTENSION: Primary | ICD-10-CM

## 2022-11-15 PROCEDURE — 99606 MTMS BY PHARM EST 15 MIN: CPT | Performed by: PHARMACIST

## 2022-11-15 ASSESSMENT — ASTHMA QUESTIONNAIRES: ACT_TOTALSCORE: 25

## 2022-11-15 NOTE — PROGRESS NOTES
Medication Therapy Management (MTM) Encounter    ASSESSMENT:                            Medication Adherence/Access: No issues identified    Class II Obesity (BMI 35 to 39.9): improving, so far ~3% weight loss. No changes would continue current therapy.    Hypertension: Patient is meeting blood pressure goal of < 130/80mmHg, blood pressure lower today should monitor for hypotension.    PLAN:                            Praised patient on weight loss so far.    Stay on Saxenda 3 mg in the morning.     Please monitor your blood pressure if you get dizzy or light headed.     Follow-up: 2 months    SUBJECTIVE/OBJECTIVE:                          Maylin Ni is a 62 year old female coming in for a follow-up visit.  Today's visit is a follow-up MTM visit from 10/11/2022.     Reason for visit: follow-up on Saxenda start.     Allergies/ADRs: Reviewed in chart  Past Medical History: Reviewed in chart  Tobacco: She reports that she has never smoked. She has never used smokeless tobacco.  Alcohol: Less than 1 beverages / week  Social history: Works as a nurse manager for 30 years    Medication Adherence/Access:   Medication barriers: none.   The patient fills medications at [place]: YES. [place]    Class II Obesity (BMI 35 to 39.9): Current medication(s) include: Saxenda 3 mg in the morning (increased this past week).   She notices appetite is down and working on smaller portions is easier. She has notice less bowel movement but denies and straining or hard stools. Notices more nausea/gas at night but no vomiting. When she first started Saxenda had a mild headache but resolved now..   She is going 12-14 times to the [facility] to exercise - strength training and treadmill still. She feels she still needs to work on losing belly fat.    She reports history of 250 lbs down to 205 lbs when she worked with a weight clinic in the past.  She feels this is the best and her goal.  Weight prior to starting therapy ~  "229lbs.  Failed medications include: Phentermine: 30mg daily  Wt Readings from Last 4 Encounters:   11/15/22 222 lb 14.4 oz (101.1 kg)   10/11/22 228 lb (103.4 kg)   05/16/22 229 lb (103.9 kg)   07/16/21 229 lb (103.9 kg)     Estimated body mass index is 35.98 kg/m  as calculated from the following:    Height as of 5/16/22: 5' 6\" (1.676 m).    Weight as of this encounter: 222 lb 14.4 oz (101.1 kg).    Hypertension: Current medications include lisinopril-hydrochlorothiazide 20-12.5 mg daily.  Patient does not self-monitor blood pressure.  Patient reports no current medication side effects.  BP Readings from Last 3 Encounters:   11/15/22 108/76   10/11/22 124/78   10/05/22 128/78       BP Readings from Last 3 Encounters:   11/15/22 108/76   10/11/22 124/78   10/05/22 128/78       Today's Vitals: /76   Pulse 105   Wt 222 lb 14.4 oz (101.1 kg)   SpO2 100%   BMI 35.98 kg/m    ----------------      I spent 22 minutes with this patient today. All changes were made via collaborative practice agreement with Liang Medina MD. A copy of the visit note was provided to the patient's provider(s).    The patient was given a summary of these recommendations.     Anastasia Urbina, Norma  Medication Therapy Management Pharmacist       Medication Therapy Recommendations  No medication therapy recommendations to display     "

## 2022-11-15 NOTE — PATIENT INSTRUCTIONS
"Recommendations from today's MTM visit:                                                      Great job your weight loss.    Stay on Saxenda 3 mg in the morning.     Please monitor your blood pressure if you get dizzy or light headed.     Follow-up: 2 months    It was great speaking with you today.  I value your experience and would be very thankful for your time in providing feedback in our clinic survey. In the next few days, you may receive an email or text message from [employer] with a link to a survey related to your  clinical pharmacist.\"     To schedule another MTM appointment, please call the clinic directly or you may call the MTM scheduling line at [phone] or toll-free at [phone].     My Clinical Pharmacist's contact information:                                                      Please feel free to contact me with any questions or concerns you have.      Anastasia Urbina, PharmD  Medication Therapy Management Pharmacist     "

## 2023-01-23 ENCOUNTER — OFFICE VISIT (OUTPATIENT)
Dept: PHARMACY | Facility: CLINIC | Age: 63
End: 2023-01-23
Payer: COMMERCIAL

## 2023-01-23 VITALS
SYSTOLIC BLOOD PRESSURE: 108 MMHG | DIASTOLIC BLOOD PRESSURE: 78 MMHG | OXYGEN SATURATION: 97 % | WEIGHT: 218.7 LBS | BODY MASS INDEX: 35.3 KG/M2 | HEART RATE: 90 BPM

## 2023-01-23 DIAGNOSIS — I10 ESSENTIAL HYPERTENSION: ICD-10-CM

## 2023-01-23 DIAGNOSIS — E66.9 OBESITY, UNSPECIFIED CLASSIFICATION, UNSPECIFIED OBESITY TYPE, UNSPECIFIED WHETHER SERIOUS COMORBIDITY PRESENT: Primary | ICD-10-CM

## 2023-01-23 PROCEDURE — 99605 MTMS BY PHARM NP 15 MIN: CPT | Performed by: PHARMACIST

## 2023-01-23 NOTE — PROGRESS NOTES
Medication Therapy Management (MTM) Encounter    ASSESSMENT:                            Medication Adherence/Access: No issues identified    Class III Obesity: She has lost ~4.8% of her bodyweight after about 2 months on the target dose. Too soon to assess if she has met goal of 5% in 3 months.    Hypertension: Stable, continue current regimen.    PLAN:                            1. Continue Saxenda 3mg for another 3 months and re-evaluate.     Follow-up: 3 months    SUBJECTIVE/OBJECTIVE:                          Maylin Ni is a 62 year old female coming in for a follow-up visit.  Today's visit is a follow-up MTM visit from 11/15/2022.     Reason for visit: MTM follow up visit.    Allergies/ADRs: Reviewed in chart  Past Medical History: Reviewed in chart  Tobacco: She reports that she has never smoked. She has never used smokeless tobacco.  Alcohol: Less than 1 beverages / week  Social history: Works as a nurse manager for 30 years    Medication Adherence/Access:   Medication barriers: none.   The patient fills medications at [place]: YES. [place]    Class II Obesity (BMI 35 to 39.9): Current medication(s) include: Saxenda 3 mg in the morning (has been on this dose since November)  She notices appetite is down and working on smaller portions is easier.   Side effects: no side effects   [facility] -  Combination of strength and cardio, total 30 minutes each time. She is often on her feet at work as well. Denies any SOB asthma concern with activity.  She reports that she is controlling her portions when she eats at home or in a restaurant. She feels day faster on Saxenda and also making conscious choices to eat less.  She is hoping to lose more weight. She is open to trialing other weight loss medicatiosn (Wegovy) if she stops seeing results on Saxenda.  She reports history of 250 lbs down to 205 lbs when she worked with a weight clinic in the past.  She  "feels this is the best and her goal.  Weight prior to starting therapy ~ 229lbs in October.  Failed medications include: Phentermine: 30mg daily   Wt Readings from Last 4 Encounters:   01/23/23 218 lb 11.2 oz (99.2 kg)   11/15/22 222 lb 14.4 oz (101.1 kg)   10/11/22 228 lb (103.4 kg)   05/16/22 229 lb (103.9 kg)     Estimated body mass index is 35.3 kg/m  as calculated from the following:    Height as of 5/16/22: 5' 6\" (1.676 m).    Weight as of this encounter: 218 lb 11.2 oz (99.2 kg).    Hypertension: Current medications include lisinopril-hydrochlorothiazide 20-12.5 mg daily.  Patient does not self-monitor blood pressure.  Patient reports no current medication side effects.  BP Readings from Last 3 Encounters:   01/23/23 108/78   11/15/22 108/76   10/11/22 124/78     Today's Vitals: /78   Pulse 90   Wt 218 lb 11.2 oz (99.2 kg)   SpO2 97%   BMI 35.30 kg/m    ----------------    I spent 30 minutes with this patient today. All changes were made via collaborative practice agreement with Liang Medina MD. A copy of the visit note was provided to the patient's provider(s).    A summary of these recommendations was given to the patient.    Sterling Calderon, PharmD  Pharmacist Resident    Anastasia Urbina, PharmD  Medication Therapy Management Pharmacist     Medication Therapy Recommendations  No medication therapy recommendations to display       "

## 2023-01-23 NOTE — PATIENT INSTRUCTIONS
"Recommendations from today's MTM visit:                                                      Continue Saxenda 3mg daily. In future, may consider Wegovy change if needed.    Follow-up: Return in about 13 weeks (around 4/24/2023).    It was great speaking with you today.  I value your experience and would be very thankful for your time in providing feedback in our clinic survey. In the next few days, you may receive an email or text message from [facility] [employer] with a link to a survey related to your  clinical pharmacist.\"     To schedule another MTM appointment, please call the clinic directly or you may call the MTM scheduling line at [phone] or toll-free at [phone].     My Clinical Pharmacist's contact information:                                                      Please feel free to contact me with any questions or concerns you have.      Amarilys Fang    "

## 2023-03-08 NOTE — TELEPHONE ENCOUNTER
FUTURE VISIT INFORMATION      FUTURE VISIT INFORMATION:    Date: 4/17/23    Time: 7:30am    Location: [facility] [place]  REFERRAL INFORMATION:    Reason for visit/diagnosis  Droopy eyelids    RECORDS REQUESTED FROM:       Clinic name Comments Records Status Imaging Status   [facility] 9/6/22, 6/25/20 EPIC    [facility] 3/17/16-1/28/11 Care Everywhere

## 2023-03-28 ENCOUNTER — TRANSFERRED RECORDS (OUTPATIENT)
Dept: HEALTH INFORMATION MANAGEMENT | Facility: CLINIC | Age: 63
End: 2023-03-28

## 2023-04-17 ENCOUNTER — PRE VISIT (OUTPATIENT)
Dept: OPHTHALMOLOGY | Facility: CLINIC | Age: 63
End: 2023-04-17

## 2023-04-17 ENCOUNTER — OFFICE VISIT (OUTPATIENT)
Dept: OPHTHALMOLOGY | Facility: CLINIC | Age: 63
End: 2023-04-17
Payer: COMMERCIAL

## 2023-04-17 DIAGNOSIS — H02.834 DERMATOCHALASIS OF BOTH UPPER EYELIDS: Primary | ICD-10-CM

## 2023-04-17 DIAGNOSIS — H02.831 DERMATOCHALASIS OF BOTH UPPER EYELIDS: Primary | ICD-10-CM

## 2023-04-17 PROCEDURE — 99213 OFFICE O/P EST LOW 20 MIN: CPT | Performed by: OPHTHALMOLOGY

## 2023-04-17 PROCEDURE — 92082 INTERMEDIATE VISUAL FIELD XM: CPT | Performed by: OPHTHALMOLOGY

## 2023-04-17 PROCEDURE — 92285 EXTERNAL OCULAR PHOTOGRAPHY: CPT | Performed by: OPHTHALMOLOGY

## 2023-04-17 ASSESSMENT — CONF VISUAL FIELD
METHOD: COUNTING FINGERS
OD_SUPERIOR_NASAL_RESTRICTION: 0
OD_NORMAL: 1
OS_SUPERIOR_NASAL_RESTRICTION: 0
OD_SUPERIOR_TEMPORAL_RESTRICTION: 0
OD_INFERIOR_NASAL_RESTRICTION: 0
OS_INFERIOR_NASAL_RESTRICTION: 0
OD_INFERIOR_TEMPORAL_RESTRICTION: 0
OS_NORMAL: 1
OS_INFERIOR_TEMPORAL_RESTRICTION: 0
OS_SUPERIOR_TEMPORAL_RESTRICTION: 0

## 2023-04-17 ASSESSMENT — TONOMETRY
OD_IOP_MMHG: 16
IOP_METHOD: TONOPEN
OS_IOP_MMHG: 13

## 2023-04-17 ASSESSMENT — VISUAL ACUITY
CORRECTION_TYPE: GLASSES
OD_CC: 20/15
OS_CC: 20/25
METHOD: SNELLEN - LINEAR
OD_CC+: -1

## 2023-04-17 ASSESSMENT — LAGOPHTHALMOS
OD_LAGOPHTHALMOS: 15
OS_LAGOPHTHALMOS: 15

## 2023-04-17 ASSESSMENT — SLIT LAMP EXAM - LIDS
COMMENTS: 3+ DERMATOCHALASIS
COMMENTS: 3+ DERMATOCHALASIS

## 2023-04-17 ASSESSMENT — MARGIN REFLEX DISTANCE
OS_MRD1: 2
OD_MRD1: 2

## 2023-04-17 ASSESSMENT — EXTERNAL EXAM - RIGHT EYE: OD_EXAM: 1+ BROW PTOSIS

## 2023-04-17 ASSESSMENT — EXTERNAL EXAM - LEFT EYE: OS_EXAM: 1+ BROW PTOSIS

## 2023-04-17 NOTE — LETTER
2023         RE:  :  MRN: Maylin Ni  1960  [id number]     Dear Dr. Ward,    Thank you for asking me to see your patient, Mayiln Ni, for an oculoplastic   consultation.  My assessment and plan are below.  For further details, please see my attached clinic note.      Assessment & Plan     Maylin Ni is a 62 year old female with the following diagnoses:   1. Dermatochalasis of both upper eyelids      PLAN:  Bilateral upper blepharoplasty    Again, thank you for allowing me to participate in the care of your patient.      Sincerely,    Home Ritchie MD  Department of Ophthalmology and Visual Neurosciences  [facility]    CC: Hiwot Ward, OD  [address] [place] [id number]  Via In Basket

## 2023-04-17 NOTE — PATIENT INSTRUCTIONS
BLEPHAROPLASTY    Your eyes are often the first thing people notice about you and are an important aspect of your overall appearance. As we age, the tone and shape of our eyelids can loosen and sag. Heredity and sun exposure also contribute to this process. This excess, puffy or lax skin can make you appear more tired or appear older. Eyelid surgery or blepharoplasty (pronounced  cnll-l-qk-plasty ) can give the eyes a more youthful look by removing excess skin, bulging fat, and lax muscle from the upper or lower eyelids. If the sagging upper eyelid skin obstructs peripheral vision, blepharoplasty can eliminate the obstruction and expand the visual field.     Upper Blepharoplasty     For the upper eyelids, excess skin and fat are removed through an incision hidden in the natural eyelid crease. If the lid is droopy (ptosis), the muscle that raises the upper eyelid can be tightened. The incision is then closed with fine sutures.     Lower Blepharoplasty     Fat in the lower eyelids can be removed or repositioned through an incision hidden on the inner surface of the eyelid.  If there is excessive skin in the lower lid, the skin can be removed through incision is made just below the lashes. Fat can be removed or repositioned through this incision, and the excess skin removed. The incision is then closed with fine sutures.     Upper and Lower Blepharoplasty     Upper and lower blepharoplasty can be performed together and also can be combined with other procedures such as eyebrow or forehead lift, midface lift, face lift, neck lift, or laser skin resurfacing.  The procedures are typically performed as an outpatient procedure and typically take 45 min to 1.5 hours to perform.  Most patients can return to normal activities within 1-2 weeks. Makeup may be worn to camouflage any bruising after one week.       Who Should Perform A Blepharoplasty?     When choosing a surgeon to perform blepharoplasty, look for a cosmetic and  reconstructive surgeon who specializes in the eyelids, orbit, and tear drain system. Dr. Ritchie s membership in the American Society of Ophthalmic Plastic and Reconstructive Surgery (ASOPRS) indicates he is not only a board certified ophthalmologist who knows the anatomy and structure of the eyelids and orbit, but also has had extensive training in ophthalmic plastic reconstructive and cosmetic surgery.

## 2023-04-17 NOTE — NURSING NOTE
Chief Complaints and History of Present Illnesses   Patient presents with     Consult For     Droopy eyelids, self referred     Chief Complaint(s) and History of Present Illness(es)     Consult For            Laterality: both eyes    Course: stable    Associated symptoms: Negative for dryness, eye pain, tearing and discharge    Treatments tried: no treatments    Pain scale: 0/10    Comments: Droopy eyelids, self referred          Comments    For the past couple years, she has been aware of her droopy upper eye lids.  She uses her brows to lift her lids.  She also uses her fingers to adjust her lids.  Reading is getting more difficult, she has to adjust her book higher so her lids stay open.    Gris King, COT 7:36 AM  April 17, 2023

## 2023-04-17 NOTE — PROGRESS NOTES
Chief Complaints and History of Present Illnesses   Patient presents with     Consult For     Droopy eyelids, self referred     Chief Complaint(s) and History of Present Illness(es)     Consult For    In both eyes.  Since onset it is stable.  Associated symptoms include   Negative for dryness, eye pain, tearing and discharge.  Treatments tried   include no treatments.  Pain was noted as 0/10. Additional comments:   Droopy eyelids, self referred           Comments    For the past couple years, she has been aware of her droopy upper eye   lids.  She uses her brows to lift her lids.  She also uses her fingers to   adjust her lids.  Reading is getting more difficult, she has to adjust her   book higher so her lids stay open.    Gris King, COT 7:36 AM  April 17, 2023                FUNCTIONAL COMPLAINTS RELATED TO DROOPY EYELIDS/BROWS:  Maylin Ni describes upper lids interfering with superior visual field and interfering with activities of daily living including reading, driving and watching television.     EXAM:   Dominant eye right eye     MRD1: Right eye 2   Left eye 2  Dermatochalasis with excess skin touching eyelashes       VISUAL FIELD:  Right eye untaped:12 degrees Right eye taped:48 degrees  Left eye untaped:12 degrees Left eye taped:48 degrees    Right eye visual field improves by: 36 degrees  Left eye visual field improves by: 36 degrees         Assessment & Plan     Maylin Ni is a 62 year old female with the following diagnoses:   1. Dermatochalasis of both upper eyelids             PLAN:  Bilateral upper blepharoplasty (skin/orbic/fat)              Attending Physician Attestation:  Complete documentation of historical and exam elements from today's encounter can be found in the full encounter summary report (not reduplicated in this progress note).  I personally obtained the chief complaint(s) and history of present illness.  I confirmed and edited as necessary the review of systems, past  medical/surgical history, family history, social history, and examination findings as documented by others; and I examined the patient myself.  I personally reviewed the relevant tests, images, and reports as documented above.  I formulated and edited as necessary the assessment and plan and discussed the findings and management plan with the patient and family. I personally reviewed the ophthalmic test(s) associated with this encounter, and have completed the corresponding report(s) as necessary.   -Home Ritchie MD      Today with Maylin Ni, I reviewed the indications, risks, benefits, and alternatives of the proposed surgical procedure including, but not limited to, failure obtain the desired result  and need for additional surgery, bleeding, infection, loss of vision, loss of the eye, and the remote possibility of permanent damage to any organ system or death with the use of anesthesia.  I provided multiple opportunities for the questions, answered all questions to the best of my ability, and confirmed that my answers and my discussion were understood.     - Home Ritchie MD 8:34 AM 4/17/2023

## 2023-04-18 ENCOUNTER — TELEPHONE (OUTPATIENT)
Dept: OPHTHALMOLOGY | Facility: CLINIC | Age: 63
End: 2023-04-18
Payer: COMMERCIAL

## 2023-04-18 PROBLEM — H02.831 DERMATOCHALASIS OF BOTH UPPER EYELIDS: Status: ACTIVE | Noted: 2023-04-18

## 2023-04-18 PROBLEM — H02.834 DERMATOCHALASIS OF BOTH UPPER EYELIDS: Status: ACTIVE | Noted: 2023-04-18

## 2023-04-18 NOTE — TELEPHONE ENCOUNTER
Spoke with patient to schedule surgery with Dr Ritchie     [facility]  [facility]     Post-Op visit was scheduled on 7/10  Patient is aware a / is needed day of surgery.   Surgery packet was mailed, patient has my direct contact information for any further questions.

## 2023-05-16 SDOH — HEALTH STABILITY: PHYSICAL HEALTH: ON AVERAGE, HOW MANY DAYS PER WEEK DO YOU ENGAGE IN MODERATE TO STRENUOUS EXERCISE (LIKE A BRISK WALK)?: 5 DAYS

## 2023-05-16 SDOH — ECONOMIC STABILITY: FOOD INSECURITY: WITHIN THE PAST 12 MONTHS, THE FOOD YOU BOUGHT JUST DIDN'T LAST AND YOU DIDN'T HAVE MONEY TO GET MORE.: NEVER TRUE

## 2023-05-16 SDOH — HEALTH STABILITY: PHYSICAL HEALTH: ON AVERAGE, HOW MANY MINUTES DO YOU ENGAGE IN EXERCISE AT THIS LEVEL?: 50 MIN

## 2023-05-16 SDOH — ECONOMIC STABILITY: FOOD INSECURITY: WITHIN THE PAST 12 MONTHS, YOU WORRIED THAT YOUR FOOD WOULD RUN OUT BEFORE YOU GOT MONEY TO BUY MORE.: NEVER TRUE

## 2023-05-16 SDOH — ECONOMIC STABILITY: INCOME INSECURITY: IN THE LAST 12 MONTHS, WAS THERE A TIME WHEN YOU WERE NOT ABLE TO PAY THE MORTGAGE OR RENT ON TIME?: NO

## 2023-05-16 SDOH — ECONOMIC STABILITY: INCOME INSECURITY: HOW HARD IS IT FOR YOU TO PAY FOR THE VERY BASICS LIKE FOOD, HOUSING, MEDICAL CARE, AND HEATING?: NOT HARD AT ALL

## 2023-05-16 ASSESSMENT — ASTHMA QUESTIONNAIRES
QUESTION_3 LAST FOUR WEEKS HOW OFTEN DID YOUR ASTHMA SYMPTOMS (WHEEZING, COUGHING, SHORTNESS OF BREATH, CHEST TIGHTNESS OR PAIN) WAKE YOU UP AT NIGHT OR EARLIER THAN USUAL IN THE MORNING: NOT AT ALL
ACT_TOTALSCORE: 25
QUESTION_1 LAST FOUR WEEKS HOW MUCH OF THE TIME DID YOUR ASTHMA KEEP YOU FROM GETTING AS MUCH DONE AT WORK, SCHOOL OR AT HOME: NONE OF THE TIME
ACT_TOTALSCORE: 25
QUESTION_5 LAST FOUR WEEKS HOW WOULD YOU RATE YOUR ASTHMA CONTROL: COMPLETELY CONTROLLED
QUESTION_2 LAST FOUR WEEKS HOW OFTEN HAVE YOU HAD SHORTNESS OF BREATH: NOT AT ALL
QUESTION_4 LAST FOUR WEEKS HOW OFTEN HAVE YOU USED YOUR RESCUE INHALER OR NEBULIZER MEDICATION (SUCH AS ALBUTEROL): NOT AT ALL

## 2023-05-16 ASSESSMENT — ENCOUNTER SYMPTOMS
BREAST MASS: 0
COUGH: 0
ARTHRALGIAS: 0
HEMATURIA: 0
EYE PAIN: 0
FEVER: 0
DIARRHEA: 0
PALPITATIONS: 0
SORE THROAT: 0
ABDOMINAL PAIN: 0
PARESTHESIAS: 0
CONSTIPATION: 0
HEARTBURN: 0
DIZZINESS: 0
NERVOUS/ANXIOUS: 0
NAUSEA: 0
SHORTNESS OF BREATH: 0
MYALGIAS: 0
HEADACHES: 0
JOINT SWELLING: 0
HEMATOCHEZIA: 0
FREQUENCY: 0
WEAKNESS: 0
DYSURIA: 0
CHILLS: 0

## 2023-05-16 ASSESSMENT — LIFESTYLE VARIABLES
HOW MANY STANDARD DRINKS CONTAINING ALCOHOL DO YOU HAVE ON A TYPICAL DAY: 1 OR 2
HOW OFTEN DO YOU HAVE SIX OR MORE DRINKS ON ONE OCCASION: NEVER
AUDIT-C TOTAL SCORE: 2
SKIP TO QUESTIONS 9-10: 1
HOW OFTEN DO YOU HAVE A DRINK CONTAINING ALCOHOL: 2-4 TIMES A MONTH

## 2023-05-16 ASSESSMENT — SOCIAL DETERMINANTS OF HEALTH (SDOH)
IN A TYPICAL WEEK, HOW MANY TIMES DO YOU TALK ON THE PHONE WITH FAMILY, FRIENDS, OR NEIGHBORS?: MORE THAN THREE TIMES A WEEK
HOW OFTEN DO YOU GET TOGETHER WITH FRIENDS OR RELATIVES?: TWICE A WEEK
HOW OFTEN DO YOU ATTEND CHURCH OR RELIGIOUS SERVICES?: MORE THAN 4 TIMES PER YEAR
DO YOU BELONG TO ANY CLUBS OR ORGANIZATIONS SUCH AS CHURCH GROUPS UNIONS, FRATERNAL OR ATHLETIC GROUPS, OR SCHOOL GROUPS?: NO

## 2023-05-23 ENCOUNTER — ANCILLARY PROCEDURE (OUTPATIENT)
Dept: MAMMOGRAPHY | Facility: CLINIC | Age: 63
End: 2023-05-23
Payer: COMMERCIAL

## 2023-05-23 ENCOUNTER — OFFICE VISIT (OUTPATIENT)
Dept: PEDIATRICS | Facility: CLINIC | Age: 63
End: 2023-05-23
Payer: COMMERCIAL

## 2023-05-23 VITALS
RESPIRATION RATE: 16 BRPM | OXYGEN SATURATION: 98 % | SYSTOLIC BLOOD PRESSURE: 116 MMHG | DIASTOLIC BLOOD PRESSURE: 84 MMHG | BODY MASS INDEX: 36.66 KG/M2 | HEART RATE: 82 BPM | TEMPERATURE: 97.9 F | WEIGHT: 228.1 LBS | HEIGHT: 66 IN

## 2023-05-23 DIAGNOSIS — E66.01 CLASS 2 SEVERE OBESITY DUE TO EXCESS CALORIES WITH SERIOUS COMORBIDITY AND BODY MASS INDEX (BMI) OF 36.0 TO 36.9 IN ADULT (H): ICD-10-CM

## 2023-05-23 DIAGNOSIS — Z12.31 VISIT FOR SCREENING MAMMOGRAM: ICD-10-CM

## 2023-05-23 DIAGNOSIS — Z00.00 ROUTINE GENERAL MEDICAL EXAMINATION AT A HEALTH CARE FACILITY: Primary | ICD-10-CM

## 2023-05-23 DIAGNOSIS — Z13.220 SCREENING FOR LIPID DISORDERS: ICD-10-CM

## 2023-05-23 DIAGNOSIS — E66.812 CLASS 2 SEVERE OBESITY DUE TO EXCESS CALORIES WITH SERIOUS COMORBIDITY AND BODY MASS INDEX (BMI) OF 36.0 TO 36.9 IN ADULT (H): ICD-10-CM

## 2023-05-23 DIAGNOSIS — I10 ESSENTIAL HYPERTENSION: ICD-10-CM

## 2023-05-23 LAB
ANION GAP SERPL CALCULATED.3IONS-SCNC: 14 MMOL/L (ref 7–15)
BUN SERPL-MCNC: 9.9 MG/DL (ref 8–23)
CALCIUM SERPL-MCNC: 9.6 MG/DL (ref 8.8–10.2)
CHLORIDE SERPL-SCNC: 102 MMOL/L (ref 98–107)
CHOLEST SERPL-MCNC: 187 MG/DL
CREAT SERPL-MCNC: 0.62 MG/DL (ref 0.51–0.95)
DEPRECATED HCO3 PLAS-SCNC: 25 MMOL/L (ref 22–29)
GFR SERPL CREATININE-BSD FRML MDRD: >90 ML/MIN/1.73M2
GLUCOSE SERPL-MCNC: 90 MG/DL (ref 70–99)
HDLC SERPL-MCNC: 68 MG/DL
LDLC SERPL CALC-MCNC: 100 MG/DL
NONHDLC SERPL-MCNC: 119 MG/DL
POTASSIUM SERPL-SCNC: 3.4 MMOL/L (ref 3.4–5.3)
SODIUM SERPL-SCNC: 141 MMOL/L (ref 136–145)
TRIGL SERPL-MCNC: 96 MG/DL

## 2023-05-23 PROCEDURE — 77063 BREAST TOMOSYNTHESIS BI: CPT | Mod: TC | Performed by: RADIOLOGY

## 2023-05-23 PROCEDURE — 90471 IMMUNIZATION ADMIN: CPT | Performed by: INTERNAL MEDICINE

## 2023-05-23 PROCEDURE — 36415 COLL VENOUS BLD VENIPUNCTURE: CPT | Performed by: INTERNAL MEDICINE

## 2023-05-23 PROCEDURE — 90715 TDAP VACCINE 7 YRS/> IM: CPT | Performed by: INTERNAL MEDICINE

## 2023-05-23 PROCEDURE — 80061 LIPID PANEL: CPT | Performed by: INTERNAL MEDICINE

## 2023-05-23 PROCEDURE — 80048 BASIC METABOLIC PNL TOTAL CA: CPT | Performed by: INTERNAL MEDICINE

## 2023-05-23 PROCEDURE — 77067 SCR MAMMO BI INCL CAD: CPT | Mod: TC | Performed by: RADIOLOGY

## 2023-05-23 PROCEDURE — 99396 PREV VISIT EST AGE 40-64: CPT | Mod: 25 | Performed by: INTERNAL MEDICINE

## 2023-05-23 RX ORDER — LISINOPRIL AND HYDROCHLOROTHIAZIDE 12.5; 2 MG/1; MG/1
2 TABLET ORAL DAILY
Qty: 180 TABLET | Refills: 3 | Status: SHIPPED | OUTPATIENT
Start: 2023-05-23 | End: 2024-06-03

## 2023-05-23 ASSESSMENT — ENCOUNTER SYMPTOMS
HEARTBURN: 0
NAUSEA: 0
FREQUENCY: 0
BREAST MASS: 0
DYSURIA: 0
COUGH: 0
CHILLS: 0
HEADACHES: 0
PARESTHESIAS: 0
JOINT SWELLING: 0
HEMATURIA: 0
NERVOUS/ANXIOUS: 0
SORE THROAT: 0
EYE PAIN: 0
PALPITATIONS: 0
DIZZINESS: 0
FEVER: 0
HEMATOCHEZIA: 0
WEAKNESS: 0
ABDOMINAL PAIN: 0
CONSTIPATION: 0
ARTHRALGIAS: 0
SHORTNESS OF BREATH: 0
MYALGIAS: 0
DIARRHEA: 0

## 2023-05-23 ASSESSMENT — PAIN SCALES - GENERAL: PAINLEVEL: NO PAIN (0)

## 2023-05-23 NOTE — PATIENT INSTRUCTIONS
Preventive Health Recommendations  Female Ages 50 - 64    Yearly exam: See your health care provider every year in order to  Review health changes.   Discuss preventive care.    Review your medicines if your doctor has prescribed any.      You do not need a Pap test if your uterus was removed (hysterectomy) and you have not had cancer.    Have a mammogram every 1 to 2 years.  Have a colonoscopy in 2025  Have a cholesterol test every 5 years, or more often if advised.  Have a diabetes test (fasting glucose) every three years. If you are at risk for diabetes, you should have this test more often.   If you are at risk for osteoporosis (brittle bone disease), think about having a bone density scan (DEXA).    Shots: Get a flu shot each year. Get a tetanus shot every 10 years.    Nutrition:   Eat at least 5 servings of fruits and vegetables each day.  Eat whole-grain bread, whole-wheat pasta and brown rice instead of white grains and rice.  Get adequate Calcium and Vitamin D.     Lifestyle  Exercise at least 150 minutes a week (30 minutes a day, 5 days a week). This will help you control your weight and prevent disease.  Limit alcohol to one drink per day.  No smoking.   Wear sunscreen to prevent skin cancer.   See your dentist every six months for an exam and cleaning.  See your eye doctor every 1 to 2 years.

## 2023-05-24 PROBLEM — E66.812 CLASS 2 SEVERE OBESITY DUE TO EXCESS CALORIES WITH SERIOUS COMORBIDITY AND BODY MASS INDEX (BMI) OF 36.0 TO 36.9 IN ADULT (H): Status: ACTIVE | Noted: 2021-04-27

## 2023-05-30 ENCOUNTER — TELEPHONE (OUTPATIENT)
Dept: OPHTHALMOLOGY | Facility: CLINIC | Age: 63
End: 2023-05-30
Payer: COMMERCIAL

## 2023-05-30 NOTE — TELEPHONE ENCOUNTER
Spoke with patient regarding rescheduling for an earlier same day POST-OP appointment out of procedure spot. Rescheduled patient accordingly and [facility]

## 2023-06-14 ENCOUNTER — OFFICE VISIT (OUTPATIENT)
Dept: PEDIATRICS | Facility: CLINIC | Age: 63
End: 2023-06-14
Payer: COMMERCIAL

## 2023-06-14 VITALS
SYSTOLIC BLOOD PRESSURE: 118 MMHG | DIASTOLIC BLOOD PRESSURE: 72 MMHG | WEIGHT: 230.2 LBS | TEMPERATURE: 98.2 F | OXYGEN SATURATION: 96 % | HEIGHT: 66 IN | HEART RATE: 76 BPM | RESPIRATION RATE: 20 BRPM | BODY MASS INDEX: 37 KG/M2

## 2023-06-14 DIAGNOSIS — E66.812 CLASS 2 SEVERE OBESITY DUE TO EXCESS CALORIES WITH SERIOUS COMORBIDITY AND BODY MASS INDEX (BMI) OF 36.0 TO 36.9 IN ADULT (H): ICD-10-CM

## 2023-06-14 DIAGNOSIS — I10 ESSENTIAL HYPERTENSION: ICD-10-CM

## 2023-06-14 DIAGNOSIS — E66.01 CLASS 2 SEVERE OBESITY DUE TO EXCESS CALORIES WITH SERIOUS COMORBIDITY AND BODY MASS INDEX (BMI) OF 36.0 TO 36.9 IN ADULT (H): ICD-10-CM

## 2023-06-14 DIAGNOSIS — Z01.818 PREOP GENERAL PHYSICAL EXAM: Primary | ICD-10-CM

## 2023-06-14 DIAGNOSIS — H02.403 PTOSIS OF BOTH EYELIDS: ICD-10-CM

## 2023-06-14 PROBLEM — D35.00 ADRENAL CORTICAL ADENOMA: Status: ACTIVE | Noted: 2023-06-14

## 2023-06-14 PROCEDURE — 99214 OFFICE O/P EST MOD 30 MIN: CPT | Performed by: PHYSICIAN ASSISTANT

## 2023-06-14 ASSESSMENT — PAIN SCALES - GENERAL: PAINLEVEL: NO PAIN (0)

## 2023-06-14 NOTE — PATIENT INSTRUCTIONS
For informational purposes only. Not to replace the advice of your health care provider. Copyright   2003,  [facility]. All rights reserved. Clinically reviewed by Lety Camp MD. [employer] [id number] - REV .  Preparing for Your Surgery  Getting started  A nurse will call you to review your health history and instructions. They will give you an arrival time based on your scheduled surgery time. Please be ready to share:  Your doctor's clinic name and phone number  Your medical, surgical, and anesthesia history  A list of allergies and sensitivities  A list of medicines, including herbal treatments and over-the-counter drugs  Whether the patient has a legal guardian (ask how to send us the papers in advance)  Please tell us if you're pregnant--or if there's any chance you might be pregnant. Some surgeries may injure a fetus (unborn baby), so they require a pregnancy test. Surgeries that are safe for a fetus don't always need a test, and you can choose whether to have one.   If you have a child who's having surgery, please ask for a copy of Preparing for Your Child's Surgery.    Preparing for surgery  Within 10 to 30 days of surgery: Have a pre-op exam (sometimes called an H&P, or History and Physical). This can be done at a clinic or pre-operative center.  If you're having a , you may not need this exam. Talk to your care team.  At your pre-op exam, talk to your care team about all medicines you take. If you need to stop any medicines before surgery, ask when to start taking them again.  We do this for your safety. Many medicines can make you bleed too much during surgery. Some change how well surgery (anesthesia) drugs work.  Call your insurance company to let them know you're having surgery. (If you don't have insurance, call [phone].)  Call your clinic if there's any change in your health. This includes signs of a cold or flu (sore throat, runny nose, cough, rash, fever). It  also includes a scrape or scratch near the surgery site.  If you have questions on the day of surgery, call your hospital or surgery center.  Eating and drinking guidelines  For your safety: Unless your surgeon tells you otherwise, follow the guidelines below.  Eat and drink as usual until 8 hours before you arrive for surgery. After that, no food or milk.  Drink clear liquids until 2 hours before you arrive. These are liquids you can see through, like water, Gatorade, and Propel Water. They also include plain black coffee and tea (no cream or milk), candy, and breath mints. You can spit out gum when you arrive.  If you drink alcohol: Stop drinking it the night before surgery.  If your care team tells you to take medicine on the morning of surgery, it's okay to take it with a sip of water.  Preventing infection  Shower or bathe the night before and morning of your surgery. Follow the instructions your clinic gave you. (If no instructions, use regular soap.)  Don't shave or clip hair near your surgery site. We'll remove the hair if needed.  Don't smoke or vape the morning of surgery. You may chew nicotine gum up to 2 hours before surgery. A nicotine patch is okay.  Note: Some surgeries require you to completely quit smoking and nicotine. Check with your surgeon.  Your care team will make every effort to keep you safe from infection. We will:  Clean our hands often with soap and water (or an alcohol-based hand rub).  Clean the skin at your surgery site with a special soap that kills germs.  Give you a special gown to keep you warm. (Cold raises the risk of infection.)  Wear special hair covers, masks, gowns and gloves during surgery.  Give antibiotic medicine, if prescribed. Not all surgeries need antibiotics.  What to bring on the day of surgery  Photo ID and insurance card  Copy of your health care directive, if you have one  Glasses and hearing aids (bring cases)  You can't wear contacts during surgery  Inhaler and  eye drops, if you use them (tell us about these when you arrive)  CPAP machine or breathing device, if you use them  A few personal items, if spending the night  If you have . . .  A pacemaker, ICD (cardiac defibrillator) or other implant: Bring the ID card.  An implanted stimulator: Bring the remote control.  A legal guardian: Bring a copy of the certified (court-stamped) guardianship papers.  Please remove any jewelry, including body piercings. Leave jewelry and other valuables at home.  If you're going home the day of surgery  You must have a responsible adult drive you home. They should stay with you overnight as well.  If you don't have someone to stay with you, and you aren't safe to go home alone, we may keep you overnight. Insurance often won't pay for this.  After surgery  If it's hard to control your pain or you need more pain medicine, please call your surgeon's office.  Questions?   If you have any questions for your care team, list them here: _________________________________________________________________________________________________________________________________________________________________________ ____________________________________ ____________________________________ ____________________________________    How to Take Your Medication Before Surgery  - Take all of your medications before surgery as usual    For informational purposes only. Not to replace the advice of your health care provider. Copyright   2003, 2019 [employer] All rights reserved. Clinically reviewed by Lety Camp MD. [employer] [id number] - REV 12/22.  Preparing for Your Surgery  Getting started  A nurse will call you to review your health history and instructions. They will give you an arrival time based on your scheduled surgery time. Please be ready to share:  Your doctor's clinic name and phone number  Your medical, surgical, and anesthesia history  A list of allergies and sensitivities  A list of  medicines, including herbal treatments and over-the-counter drugs  Whether the patient has a legal guardian (ask how to send us the papers in advance)  Please tell us if you're pregnant--or if there's any chance you might be pregnant. Some surgeries may injure a fetus (unborn baby), so they require a pregnancy test. Surgeries that are safe for a fetus don't always need a test, and you can choose whether to have one.   If you have a child who's having surgery, please ask for a copy of Preparing for Your Child's Surgery.    Preparing for surgery  Within 10 to 30 days of surgery: Have a pre-op exam (sometimes called an H&P, or History and Physical). This can be done at a clinic or pre-operative center.  If you're having a , you may not need this exam. Talk to your care team.  At your pre-op exam, talk to your care team about all medicines you take. If you need to stop any medicines before surgery, ask when to start taking them again.  We do this for your safety. Many medicines can make you bleed too much during surgery. Some change how well surgery (anesthesia) drugs work.  Call your insurance company to let them know you're having surgery. (If you don't have insurance, call [phone].)  Call your clinic if there's any change in your health. This includes signs of a cold or flu (sore throat, runny nose, cough, rash, fever). It also includes a scrape or scratch near the surgery site.  If you have questions on the day of surgery, call your hospital or surgery center.  Eating and drinking guidelines  For your safety: Unless your surgeon tells you otherwise, follow the guidelines below.  Eat and drink as usual until 8 hours before you arrive for surgery. After that, no food or milk.  Drink clear liquids until 2 hours before you arrive. These are liquids you can see through, like water, Gatorade, and Propel Water. They also include plain black coffee and tea (no cream or milk), candy, and breath mints. You can  spit out gum when you arrive.  If you drink alcohol: Stop drinking it the night before surgery.  If your care team tells you to take medicine on the morning of surgery, it's okay to take it with a sip of water.  Preventing infection  Shower or bathe the night before and morning of your surgery. Follow the instructions your clinic gave you. (If no instructions, use regular soap.)  Don't shave or clip hair near your surgery site. We'll remove the hair if needed.  Don't smoke or vape the morning of surgery. You may chew nicotine gum up to 2 hours before surgery. A nicotine patch is okay.  Note: Some surgeries require you to completely quit smoking and nicotine. Check with your surgeon.  Your care team will make every effort to keep you safe from infection. We will:  Clean our hands often with soap and water (or an alcohol-based hand rub).  Clean the skin at your surgery site with a special soap that kills germs.  Give you a special gown to keep you warm. (Cold raises the risk of infection.)  Wear special hair covers, masks, gowns and gloves during surgery.  Give antibiotic medicine, if prescribed. Not all surgeries need antibiotics.  What to bring on the day of surgery  Photo ID and insurance card  Copy of your health care directive, if you have one  Glasses and hearing aids (bring cases)  You can't wear contacts during surgery  Inhaler and eye drops, if you use them (tell us about these when you arrive)  CPAP machine or breathing device, if you use them  A few personal items, if spending the night  If you have . . .  A pacemaker, ICD (cardiac defibrillator) or other implant: Bring the ID card.  An implanted stimulator: Bring the remote control.  A legal guardian: Bring a copy of the certified (court-stamped) guardianship papers.  Please remove any jewelry, including body piercings. Leave jewelry and other valuables at home.  If you're going home the day of surgery  You must have a responsible adult drive you home.  They should stay with you overnight as well.  If you don't have someone to stay with you, and you aren't safe to go home alone, we may keep you overnight. Insurance often won't pay for this.  After surgery  If it's hard to control your pain or you need more pain medicine, please call your surgeon's office.  Questions?   If you have any questions for your care team, list them here: _________________________________________________________________________________________________________________________________________________________________________ ____________________________________ ____________________________________ ____________________________________    How to Take Your Medication Before Surgery  - Take all of your medications before surgery as usual

## 2023-06-14 NOTE — PROGRESS NOTES
[facility]  [address]  [address]  [facility] [address]  Phone: [phone]  Fax: [phone]  Primary Provider: Jessica Medina Mai  Pre-op Performing Provider: KARON NIEVES      PREOPERATIVE EVALUATION:  Today's date: 6/14/2023    Maylin Ni is a 62 year old female who presents for a preoperative evaluation.      6/14/2023     9:39 AM   Additional Questions   Roomed by Coty Patel   Accompanied by CHELE         6/14/2023     9:39 AM   Patient Reported Additional Medications   Patient reports taking the following new medications No     Surgical Information:  Surgery/Procedure: Bilateral upper eyelid  blepheroplasty  Surgery Location: [facility]  Surgeon: Dr. Ritchie  Surgery Date: 6/21/2023  Time of Surgery: do no know  Where patient plans to recover: At home with family  Fax number for surgical facility: Note does not need to be faxed, will be available electronically in Epic.    Assessment & Plan     The proposed surgical procedure is considered LOW risk.     Diagnosis Comments   1. Preop general physical exam  No ekg or labs      2. Ptosis of both eyelids  Reason for surery      3. Essential hypertension  controlled      4. Class 2 severe obesity due to excess calories with serious comorbidity and body mass index (BMI) of 36.0 to 36.9 in adult (H)                    - No identified additional risk factors other than previously addressed    Antiplatelet or Anticoagulation Medication Instructions:   - Patient is on no antiplatelet or anticoagulation medications.    Additional Medication Instructions:   - ACE/ARB: Continue without modification (e.g., MAC anesthesia, neurosurgery, spine surgery, heart failure, or labile hypertension with risk of hypertension).    RECOMMENDATION:  APPROVAL GIVEN to proceed with proposed procedure, without further diagnostic evaluation.            Subjective       HPI related to upcoming procedure: ptosis  chace        6/8/2023     5:12 PM   Preop Questions   1. Have you ever had a heart attack or stroke? No   2. Have you ever had surgery on your heart or blood vessels, such as a stent placement, a coronary artery bypass, or surgery on an artery in your head, neck, heart, or legs? No   3. Do you have chest pain with activity? No   4. Do you have a history of  heart failure? No   5. Do you currently have a cold, bronchitis or symptoms of other infection? No   6. Do you have a cough, shortness of breath, or wheezing? No   7. Do you or anyone in your family have previous history of blood clots? No   8. Do you or does anyone in your family have a serious bleeding problem such as prolonged bleeding following surgeries or cuts? No   9. Have you ever had problems with anemia or been told to take iron pills? No   10. Have you had any abnormal blood loss such as black, tarry or bloody stools, or abnormal vaginal bleeding? No   11. Have you ever had a blood transfusion? No   12. Are you willing to have a blood transfusion if it is medically needed before, during, or after your surgery? Yes   13. Have you or any of your relatives ever had problems with anesthesia? No   14. Do you have sleep apnea, excessive snoring or daytime drowsiness? No   15. Do you have any artifical heart valves or other implanted medical devices like a pacemaker, defibrillator, or continuous glucose monitor? No   16. Do you have artificial joints? No   17. Are you allergic to latex? No       Health Care Directive:  Patient does not have a Health Care Directive or Living Will: Patient states has Advance Directive and will bring in a copy to clinic.    Preoperative Review of :   reviewed - no record of controlled substances prescribed.          Review of Systems  CONSTITUTIONAL: NEGATIVE for fever, chills, change in weight  INTEGUMENTARY/SKIN: NEGATIVE for worrisome rashes, moles or lesions  EYES: ptsos lids  ENT/MOUTH: NEGATIVE for ear, mouth and  throat problems  RESP: NEGATIVE for significant cough or SOB  CV: NEGATIVE for chest pain, palpitations or peripheral edema  GI: NEGATIVE for nausea, abdominal pain, heartburn, or change in bowel habits  : NEGATIVE for frequency, dysuria, or hematuria  MUSCULOSKELETAL: NEGATIVE for significant arthralgias or myalgia  NEURO: NEGATIVE for weakness, dizziness or paresthesias  ENDOCRINE: NEGATIVE for temperature intolerance, skin/hair changes  HEME: NEGATIVE for bleeding problems  PSYCHIATRIC: NEGATIVE for changes in mood or affect    Patient Active Problem List    Diagnosis Date Noted     Dermatochalasis of both upper eyelids 04/18/2023     Priority: Medium     Added automatically from request for surgery [id number]       Class 2 severe obesity due to excess calories with serious comorbidity and body mass index (BMI) of 36.0 to 36.9 in adult (H) 04/27/2021     Priority: Medium     Essential hypertension 09/15/2007     Priority: Medium      Past Medical History:   Diagnosis Date     Endometriosis      H/O colonoscopy 07/31/2020    nl, repeat 5 years for polyp surveillance     Hypertension      Hypertension      Normal delivery     x3, 1986, 1988, 1991     Personal history of asthma     had yearly asthmatic bronchitis but last one about 2010     Tubular adenoma 04/2015    found on colonoscopy, repeat in 5 yrs     Unspecified intestinal obstruction 10/2012    small bowel obstruction, thought to be secondary to raw carrots and apples     Past Surgical History:   Procedure Laterality Date     APPENDECTOMY  01/01/1980    with R ovary, fallopian tube surgery     CHOLECYSTECTOMY  01/01/1997    Laparoscopic     hysterectomy N/A      HYSTERECTOMY  01/01/2012    has left ovary remaining     HYSTERECTOMY, PAP NO LONGER INDICATED       OOPHORECTOMY Right     1 removed, 1 remains     OTHER SURGICAL HISTORY Right 01/01/1980    oophorectomyR ovary and fallopian tube removed, endometriosis     OTHER SURGICAL HISTORY Right 08/01/2019     [facility]     [facility]       Current Outpatient Medications   Medication Sig Dispense Refill     lisinopril-hydrochlorothiazide (ZESTORETIC) 20-12.5 MG tablet Take 2 tablets by mouth daily 180 tablet 3     Multiple Vitamins-Minerals (HM MULTIVITAMIN ADULT GUMMY) CHEW Take 2 chew tab by mouth daily         No Known Allergies     Social History     Tobacco Use     Smoking status: Never     Smokeless tobacco: Never   Vaping Use     Vaping status: Never Used   Substance Use Topics     Alcohol use: Yes       History   Drug Use Unknown         Objective     /72 (BP Location: Right arm, Patient Position: Sitting, Cuff Size: Adult Large)   Pulse 76   Temp 98.2  F (36.8  C) (Tympanic)   Resp 20   Ht 1.676 m (5' 6\")   Wt 104.4 kg (230 lb 3.2 oz)   SpO2 96%   BMI 37.16 kg/m      Physical Exam    GENERAL APPEARANCE: healthy, alert and no distress     EYES: Eyes grossly normal to inspection and ptosis b/l     HENT: ear canals and TM's normal and nose and mouth without ulcers or lesions     NECK: no adenopathy, no asymmetry, masses, or scars and thyroid normal to palpation     RESP: lungs clear to auscultation - no rales, rhonchi or wheezes     CV: regular rates and rhythm, normal S1 S2, no S3 or S4 and no murmur, click or rub     ABDOMEN:  soft, nontender, no HSM or masses and bowel sounds normal     MS: extremities normal- no gross deformities noted, no evidence of inflammation in joints, FROM in all extremities.     SKIN: no suspicious lesions or rashes     NEURO: Normal strength and tone, sensory exam grossly normal, mentation intact and speech normal     PSYCH: mentation appears normal. and affect normal/bright     LYMPHATICS: No cervical adenopathy    Recent Labs   Lab Test 05/23/23  1125 05/31/22  1038 05/16/22  0957 07/16/21  0746   HGB  --  13.4  --  12.8   PLT  --  441  --   --     137   < > 138   POTASSIUM 3.4 3.5   < > 3.7   CR 0.62 0.52   " < > 0.67    < > = values in this interval not displayed.        Diagnostics:  No labs were ordered during this visit.   No EKG required for low risk surgery (cataract, skin procedure, breast biopsy, etc).    Revised Cardiac Risk Index (RCRI):  The patient has the following serious cardiovascular risks for perioperative complications:   - No serious cardiac risks = 0 points     RCRI Interpretation: 0 points: Class I (very low risk - 0.4% complication rate)           Signed Electronically by: Sameera Love PA-C  Copy of this evaluation report is provided to requesting physician.

## 2023-06-20 ENCOUNTER — ANESTHESIA EVENT (OUTPATIENT)
Dept: SURGERY | Facility: AMBULATORY SURGERY CENTER | Age: 63
End: 2023-06-20
Payer: COMMERCIAL

## 2023-06-21 ENCOUNTER — HOSPITAL ENCOUNTER (OUTPATIENT)
Facility: AMBULATORY SURGERY CENTER | Age: 63
Discharge: HOME | End: 2023-06-21
Attending: OPHTHALMOLOGY
Payer: COMMERCIAL

## 2023-06-21 ENCOUNTER — ANESTHESIA (OUTPATIENT)
Dept: SURGERY | Facility: AMBULATORY SURGERY CENTER | Age: 63
End: 2023-06-21
Payer: COMMERCIAL

## 2023-06-21 VITALS
WEIGHT: 230 LBS | BODY MASS INDEX: 36.96 KG/M2 | RESPIRATION RATE: 16 BRPM | DIASTOLIC BLOOD PRESSURE: 66 MMHG | OXYGEN SATURATION: 99 % | TEMPERATURE: 98.1 F | SYSTOLIC BLOOD PRESSURE: 131 MMHG | HEART RATE: 64 BPM | HEIGHT: 66 IN

## 2023-06-21 DIAGNOSIS — H02.831 DERMATOCHALASIS OF BOTH UPPER EYELIDS: ICD-10-CM

## 2023-06-21 DIAGNOSIS — H02.834 DERMATOCHALASIS OF BOTH UPPER EYELIDS: ICD-10-CM

## 2023-06-21 PROCEDURE — 15823 BLEPHARP UPR EYELID XCSV SKN: CPT | Mod: 50 | Performed by: OPHTHALMOLOGY

## 2023-06-21 PROCEDURE — 15823 BLEPHARP UPR EYELID XCSV SKN: CPT | Mod: LT

## 2023-06-21 RX ORDER — ERYTHROMYCIN 5 MG/G
OINTMENT OPHTHALMIC
Qty: 3.5 G | Refills: 0 | Status: SHIPPED | OUTPATIENT
Start: 2023-06-21

## 2023-06-21 RX ORDER — ONDANSETRON 4 MG/1
4 TABLET, ORALLY DISINTEGRATING ORAL EVERY 30 MIN PRN
Status: DISCONTINUED | OUTPATIENT
Start: 2023-06-21 | End: 2023-06-22 | Stop reason: HOSPADM

## 2023-06-21 RX ORDER — FENTANYL CITRATE 50 UG/ML
50 INJECTION, SOLUTION INTRAMUSCULAR; INTRAVENOUS EVERY 5 MIN PRN
Status: DISCONTINUED | OUTPATIENT
Start: 2023-06-21 | End: 2023-06-21 | Stop reason: HOSPADM

## 2023-06-21 RX ORDER — ONDANSETRON 4 MG/1
4 TABLET, ORALLY DISINTEGRATING ORAL EVERY 30 MIN PRN
Status: DISCONTINUED | OUTPATIENT
Start: 2023-06-21 | End: 2023-06-21 | Stop reason: HOSPADM

## 2023-06-21 RX ORDER — OXYCODONE HYDROCHLORIDE 5 MG/1
5 TABLET ORAL EVERY 6 HOURS PRN
Qty: 8 TABLET | Refills: 0 | Status: SHIPPED | OUTPATIENT
Start: 2023-06-21 | End: 2023-06-25

## 2023-06-21 RX ORDER — SODIUM CHLORIDE, SODIUM LACTATE, POTASSIUM CHLORIDE, CALCIUM CHLORIDE 600; 310; 30; 20 MG/100ML; MG/100ML; MG/100ML; MG/100ML
INJECTION, SOLUTION INTRAVENOUS CONTINUOUS
Status: DISCONTINUED | OUTPATIENT
Start: 2023-06-21 | End: 2023-06-21 | Stop reason: HOSPADM

## 2023-06-21 RX ORDER — FENTANYL CITRATE 50 UG/ML
25 INJECTION, SOLUTION INTRAMUSCULAR; INTRAVENOUS EVERY 5 MIN PRN
Status: DISCONTINUED | OUTPATIENT
Start: 2023-06-21 | End: 2023-06-21 | Stop reason: HOSPADM

## 2023-06-21 RX ORDER — ONDANSETRON 2 MG/ML
4 INJECTION INTRAMUSCULAR; INTRAVENOUS EVERY 30 MIN PRN
Status: DISCONTINUED | OUTPATIENT
Start: 2023-06-21 | End: 2023-06-22 | Stop reason: HOSPADM

## 2023-06-21 RX ORDER — PROPOFOL 10 MG/ML
INJECTION, EMULSION INTRAVENOUS PRN
Status: DISCONTINUED | OUTPATIENT
Start: 2023-06-21 | End: 2023-06-21

## 2023-06-21 RX ORDER — HYDROMORPHONE HYDROCHLORIDE 1 MG/ML
0.4 INJECTION, SOLUTION INTRAMUSCULAR; INTRAVENOUS; SUBCUTANEOUS EVERY 5 MIN PRN
Status: DISCONTINUED | OUTPATIENT
Start: 2023-06-21 | End: 2023-06-21 | Stop reason: HOSPADM

## 2023-06-21 RX ORDER — LIDOCAINE HYDROCHLORIDE AND EPINEPHRINE 10; 10 MG/ML; UG/ML
INJECTION, SOLUTION INFILTRATION; PERINEURAL PRN
Status: DISCONTINUED | OUTPATIENT
Start: 2023-06-21 | End: 2023-06-21 | Stop reason: HOSPADM

## 2023-06-21 RX ORDER — ONDANSETRON 2 MG/ML
4 INJECTION INTRAMUSCULAR; INTRAVENOUS EVERY 30 MIN PRN
Status: DISCONTINUED | OUTPATIENT
Start: 2023-06-21 | End: 2023-06-21 | Stop reason: HOSPADM

## 2023-06-21 RX ORDER — HYDROMORPHONE HYDROCHLORIDE 1 MG/ML
0.2 INJECTION, SOLUTION INTRAMUSCULAR; INTRAVENOUS; SUBCUTANEOUS EVERY 5 MIN PRN
Status: DISCONTINUED | OUTPATIENT
Start: 2023-06-21 | End: 2023-06-21 | Stop reason: HOSPADM

## 2023-06-21 RX ORDER — OXYCODONE HYDROCHLORIDE 5 MG/1
5 TABLET ORAL
Status: DISCONTINUED | OUTPATIENT
Start: 2023-06-21 | End: 2023-06-22 | Stop reason: HOSPADM

## 2023-06-21 RX ORDER — LIDOCAINE 40 MG/G
CREAM TOPICAL
Status: DISCONTINUED | OUTPATIENT
Start: 2023-06-21 | End: 2023-06-21 | Stop reason: HOSPADM

## 2023-06-21 RX ORDER — ERYTHROMYCIN 5 MG/G
OINTMENT OPHTHALMIC PRN
Status: DISCONTINUED | OUTPATIENT
Start: 2023-06-21 | End: 2023-06-21 | Stop reason: HOSPADM

## 2023-06-21 RX ORDER — ACETAMINOPHEN 325 MG/1
975 TABLET ORAL ONCE
Status: COMPLETED | OUTPATIENT
Start: 2023-06-21 | End: 2023-06-21

## 2023-06-21 RX ORDER — FENTANYL CITRATE 50 UG/ML
INJECTION, SOLUTION INTRAMUSCULAR; INTRAVENOUS PRN
Status: DISCONTINUED | OUTPATIENT
Start: 2023-06-21 | End: 2023-06-21

## 2023-06-21 RX ORDER — TETRACAINE HYDROCHLORIDE 5 MG/ML
SOLUTION OPHTHALMIC PRN
Status: DISCONTINUED | OUTPATIENT
Start: 2023-06-21 | End: 2023-06-21 | Stop reason: HOSPADM

## 2023-06-21 RX ORDER — PROPOFOL 10 MG/ML
INJECTION, EMULSION INTRAVENOUS CONTINUOUS PRN
Status: DISCONTINUED | OUTPATIENT
Start: 2023-06-21 | End: 2023-06-21

## 2023-06-21 RX ORDER — SODIUM CHLORIDE, SODIUM LACTATE, POTASSIUM CHLORIDE, CALCIUM CHLORIDE 600; 310; 30; 20 MG/100ML; MG/100ML; MG/100ML; MG/100ML
INJECTION, SOLUTION INTRAVENOUS CONTINUOUS PRN
Status: DISCONTINUED | OUTPATIENT
Start: 2023-06-21 | End: 2023-06-21

## 2023-06-21 RX ADMIN — ACETAMINOPHEN 975 MG: 325 TABLET ORAL at 11:13

## 2023-06-21 RX ADMIN — SODIUM CHLORIDE, SODIUM LACTATE, POTASSIUM CHLORIDE, CALCIUM CHLORIDE: 600; 310; 30; 20 INJECTION, SOLUTION INTRAVENOUS at 11:57

## 2023-06-21 RX ADMIN — ONDANSETRON 4 MG: 4 TABLET, ORALLY DISINTEGRATING ORAL at 13:36

## 2023-06-21 RX ADMIN — FENTANYL CITRATE 50 MCG: 50 INJECTION, SOLUTION INTRAMUSCULAR; INTRAVENOUS at 12:06

## 2023-06-21 RX ADMIN — PROPOFOL 20 MG: 10 INJECTION, EMULSION INTRAVENOUS at 12:11

## 2023-06-21 RX ADMIN — PROPOFOL 20 MG: 10 INJECTION, EMULSION INTRAVENOUS at 12:02

## 2023-06-21 RX ADMIN — PROPOFOL 20 MG: 10 INJECTION, EMULSION INTRAVENOUS at 12:21

## 2023-06-21 RX ADMIN — PROPOFOL 150 MCG/KG/MIN: 10 INJECTION, EMULSION INTRAVENOUS at 12:00

## 2023-06-21 RX ADMIN — SODIUM CHLORIDE, SODIUM LACTATE, POTASSIUM CHLORIDE, CALCIUM CHLORIDE: 600; 310; 30; 20 INJECTION, SOLUTION INTRAVENOUS at 11:23

## 2023-06-21 NOTE — ANESTHESIA CARE TRANSFER NOTE
Patient: Maylin Ni    Procedure: Procedure(s):  bilateral upper eyelid blepharoplasty       Diagnosis: Dermatochalasis of both upper eyelids [H02.831, H02.834]  Diagnosis Additional Information: No value filed.    Anesthesia Type:   MAC     Note:    Oropharynx: spontaneously breathing  Level of Consciousness: awake  Oxygen Supplementation: room air    Independent Airway: airway patency satisfactory and stable    Vital Signs Stable: post-procedure vital signs reviewed and stable  Report to RN Given: handoff report given  Patient transferred to: Phase II    Handoff Report: Identifed the Patient, Identified the Reponsible Provider, Reviewed the pertinent medical history, Discussed the surgical course, Reviewed Intra-OP anesthesia mangement and issues during anesthesia, Set expectations for post-procedure period and Allowed opportunity for questions and acknowledgement of understanding      Vitals:  Vitals Value Taken Time   /66 06/21/23 1235   Temp 36.7  C (98.1  F) 06/21/23 1235   Pulse 78 06/21/23 1235   Resp 16 06/21/23 1235   SpO2 98 % 06/21/23 1235       Electronically Signed By: BRENT Gonzales CRNA  June 21, 2023  12:39 PM

## 2023-06-21 NOTE — ANESTHESIA POSTPROCEDURE EVALUATION
Patient: Maylin Ni    Procedure: Procedure(s):  bilateral upper eyelid blepharoplasty       Anesthesia Type:  MAC    Note:  Disposition: Outpatient   Postop Pain Control: Uneventful            Sign Out: Well controlled pain   PONV: No   Neuro/Psych: Uneventful            Sign Out: Acceptable/Baseline neuro status   Airway/Respiratory: Uneventful            Sign Out: Acceptable/Baseline resp. status   CV/Hemodynamics: Uneventful            Sign Out: Acceptable CV status; No obvious hypovolemia; No obvious fluid overload   Other NRE: NONE   DID A NON-ROUTINE EVENT OCCUR? No    Event details/Postop Comments:  No issues.             Last vitals:  Vitals Value Taken Time   /66 06/21/23 1235   Temp 36.7  C (98.1  F) 06/21/23 1235   Pulse 78 06/21/23 1235   Resp 16 06/21/23 1235   SpO2 98 % 06/21/23 1235       Electronically Signed By: Kang Moore MD  June 21, 2023  12:46 PM

## 2023-06-21 NOTE — OP NOTE
PREOPERATIVE DIAGNOSIS: Bilateral upper eyelid dermatochalasis.     POSTOPERATIVE DIAGNOSIS: Bilateral upper eyelid dermatochalasis.     PROCEDURE: Bilateral upper blepharoplasty.     SURGEON: Home Ritchie MD.     ASSISTANT: Elin Eastman MD     ASSISTANT: Brooke Thomas MD    ANESTHESIA: Monitored with local infiltration of 1% lidocaine with epinephrine.     COMPLICATIONS: None.     ESTIMATED BLOOD LOSS: Less than 5 mL.     HISTORY: Maylin Ni  presented with upper lid dermatochalasis leading to mechanical ptosis of the upper lids, blocking the superior visual field and interfering with  activities of daily living. After the risks, benefits and alternatives to the proposed procedure were explained, informed consent was obtained.     DESCRIPTION OF PROCEDURE: Maylin Ni was brought to the operating room and placed supine on the operating table. IV sedation was given. The upper lid crease and excess upper eyelid skin was marked with marking pen and infiltrated with local anesthetic. The area was prepped and draped in the typical fashion. Attention was directed to the right side. Skin was incised following marked lines. Skin flap was excised with cautery. A row of cautery was placed in the orbicularis along the inferior incision line. A strip of orbicularis was excised from the superior one-third of the incision. The orbicularis and septum were opened and the fat pads conservatively excised with the cautery. Hemostasis was obtained and the skin closed with running 6-0 plain gut suture. Attention was directed to the left side where the same procedure was performed.  Ophthalmic antibiotic ointment was applied to the eyelids and into the eyes. Maylin Ni tolerated the procedure well and left the operating room in stable condition.       Home Ritchie MD

## 2023-06-21 NOTE — DISCHARGE INSTRUCTIONS
Post-operative Instructions    Ophthalmic Plastic and Reconstructive Surgery  Home Ritchie M.D.  Elin Eastman M.D.    All instructions apply to the operated eye(s) or eyelid(s)      What to expect after surgery:  There will be some swelling, bruising, and likely a black eye (even into the lower eyelids and cheeks). Also expect crusting and discharge from the eye and/or incisions.   A small amount of surface bleeding is normal for the first 48 hours after surgery.  You may notice some bloody tears for the first few days after surgery. This is normal.  Your eye(s) and eyelid(s) may be painful and tender. This is normal after surgery. Use the pain medication as prescribed. If your pain does not improve despite the medication, contact the office.    Wound care and personal care:  Apply ice compresses 15 minutes on 15 minutes off while awake for the first 2 days after surgery, then switch to warm compresses 4 times a day until seen by your physician.   For warm packs you can place a cup of dry uncooked rice in a clean cotton sock. Place sock in microwave 30 seconds to one minute. Next place the warm sock into a plastic bag and wrap the bag with clean warm wet washcloth and place over operated eye.    You may shower or wash your hair the day after surgery. Do not bathe or go swimming for 1 week to prevent contamination of your wounds.  Do not apply make-up to the eyes or eyelids for 2 weeks after surgery.    Activity restrictions and driving:  Avoid heavy lifting, bending, exercise or strenuous activity for 1 week after surgery.  You may resume other activities and return to work as tolerated.  You may not resume driving until have you stopped using narcotic pain medications(such as Norco, Percocet, Tylenol #3).    Medications:  Restart all your regular home medications and eye drops today. If you take Plavix or Aspirin on a regular basis, wait for 3 days after your surgery before restarting these in order to  decrease the risk of bleeding complications.  Avoid aspirin and aspirin-like medications (Motrin, Aleve, Ibuprofen, [place] etc) for 5 days to reduce the risk of bleeding. You may take Tylenol (acetaminophen) for pain.  In addition to your home medications, take the following post-operative medications as prescribed by your physician:  Apply antibiotic ointment (erythromycin) to all sutures three times a day, and into the operated eye(s) at night.   Take scheduled extra strength Tylenol for pain.  You may take 1 to 2 pain pills (norco or oxycodone as prescribed) as needed for breakthrough pain up to every 6 hours.  The pain pills may make you drowsy. You must not drive a car, operate heavy machinery or drink alcohol while taking them.  The pain pills may cause constipation and nausea. Take them with some food to prevent a stomach upset. If you continue to experience nausea, call your physician.    WARNING: All the prescription pain medications listed above contain Tylenol (acetaminophen). You must not take more than 4,000 mg of acetaminophen per 24-hour period. This is equivalent to 6 tablets of Darvocet, 8 tablets of Vicodin, or 12 tablets of Norco, Percocet or Tylenol #3. If you take other over-the-counter medications containing acetaminophen, you must take the amount of acetaminophen into account and reduce the number of prescribed pain pills accordingly.    Contact information and follow-up:  Return to the Eye Clinic for a follow-up appointment with your physician as  scheduled. If no appointment has been scheduled, call [phone] for an  appointment with Dr. Ritchie within 1 to 2 weeks from your date of surgery.  -     Please email a few photos of your eye(s) or other operative site(s) to [email or web address] prior to your follow up visit.    For severe pain, bleeding, or loss of vision, call the Eye Clinic at [phone].  After hours or on weekends and holidays, call [phone] and ask to speak  with the ophthalmologist on call.    [facility]  Home Care Following Anesthesia  For 24 hours after surgery:  Get plenty of rest.  A responsible adult must stay with you for at least 24 hours after you leave the surgery center.  Do not drive or use heavy equipment.  If you have weakness or tingling, don't drive or use heavy equipment until this feeling goes away.   Do not drink alcohol.   Avoid strenuous or risky activities.  Ask for help when climbing stairs.  You may feel lightheaded.  IF so, sit for a few minutes before standing.  Have someone help you get up.   If you have nausea (feel sick to your stomach): Drink only clear liquids such as apple juice, ginger ale, broth or 7-Up.  Rest may also help.  Be sure to drink enough fluids.  Move to a regular diet as you feel able.   You may have a slight fever.  Call the doctor if your fever is over 100 F (37.7 C) (taken under the tongue) or lasts longer than 24 hours.  You may have a dry mouth, a sore throat, muscle aches or trouble sleeping. These should go away after 24 hours.  Do not make important or legal decisions.   It is recommended to avoid smoking.               Tips for taking pain medications  To get the best pain relief possible, remember these points:  Take pain medications as directed, before pain becomes severe.  Pain medication can upset your stomach: taking it with food may help.  Constipation is a common side effect of pain medication. Drink plenty of  fluids.  Eat foods high in fiber. Take a stool softener if recommended by your doctor or pharmacist.  Do not drink alcohol, drive or operate machinery while taking pain medications.  Ask about other ways to control pain, such as with heat, ice or relaxation.    Tylenol/Acetaminophen Consumption    If you feel your pain relief is insufficient, you may take Tylenol/Acetaminophen in addition to your narcotic pain medication.   Be careful not to exceed 4,000 mg of  Tylenol/Acetaminophen in a 24 hour period from all sources.  If you are taking extra strength Tylenol/acetaminophen (500 mg), the maximum dose is 8 tablets in 24 hours.  If you are taking regular strength acetaminophen (325 mg), the maximum dose is 12 tablets in 24 hours.    975 mg Tylenol taken at 11:13 AM, OK to take additional Tylenol after 5:13 PM. Follow package instructions.    Call a doctor for any of the following:  Signs of infection (fever, growing tenderness at the surgery site, a large amount of drainage or bleeding, severe pain, foul-smelling drainage, redness, swelling).  It has been over 8 to 10 hours since surgery and you are still not able to urinate (pass water).  Headache for over 24 hours.  Signs of Covid-19 infection (temperature over 100 degrees, shortness of breath, cough, loss of taste/smell, generalized body aches, persistent headache, chills, sore throat, nausea/vomiting/diarrhea)  Your doctor is:  Dr. Home Ritchie, Ophthalmology: [phone]  Or dial [phone] and ask for the resident on call for:  Ophthalmology  For emergency care, call the:  [facility]:  [phone] (TTY for hearing impaired: [phone])

## 2023-06-22 NOTE — PATIENT INSTRUCTIONS

## 2023-07-10 ENCOUNTER — OFFICE VISIT (OUTPATIENT)
Dept: OPHTHALMOLOGY | Facility: CLINIC | Age: 63
End: 2023-07-10
Payer: COMMERCIAL

## 2023-07-10 DIAGNOSIS — Z98.890 POSTOPERATIVE EYE STATE: Primary | ICD-10-CM

## 2023-07-10 PROCEDURE — 99024 POSTOP FOLLOW-UP VISIT: CPT | Mod: GC | Performed by: OPHTHALMOLOGY

## 2023-07-10 ASSESSMENT — VISUAL ACUITY
METHOD: SNELLEN - LINEAR
CORRECTION_TYPE: GLASSES
OD_CC: 20/20
OS_CC: 20/20

## 2023-07-10 ASSESSMENT — SLIT LAMP EXAM - LIDS
COMMENTS: INCISIONS C/D/I
COMMENTS: INCISIONS C/D/I

## 2023-07-10 ASSESSMENT — CONF VISUAL FIELD
OD_INFERIOR_TEMPORAL_RESTRICTION: 0
OS_NORMAL: 1
OD_SUPERIOR_TEMPORAL_RESTRICTION: 0
OD_INFERIOR_NASAL_RESTRICTION: 0
OS_INFERIOR_TEMPORAL_RESTRICTION: 0
OS_SUPERIOR_NASAL_RESTRICTION: 0
METHOD: COUNTING FINGERS
OD_NORMAL: 1
OS_INFERIOR_NASAL_RESTRICTION: 0
OD_SUPERIOR_NASAL_RESTRICTION: 0
OS_SUPERIOR_TEMPORAL_RESTRICTION: 0

## 2023-07-10 ASSESSMENT — TONOMETRY
IOP_METHOD: ICARE
OD_IOP_MMHG: 18
OS_IOP_MMHG: 16

## 2023-07-10 ASSESSMENT — EXTERNAL EXAM - LEFT EYE: OS_EXAM: 1+ BROW PTOSIS

## 2023-07-10 ASSESSMENT — EXTERNAL EXAM - RIGHT EYE: OD_EXAM: 1+ BROW PTOSIS

## 2023-07-10 NOTE — PROGRESS NOTES
Chief Complaints and History of Present Illnesses   Patient presents with     Follow Up For Surgery Of Eye     Chief Complaint(s) and History of Present Illness(es)     Follow Up For Surgery Of Eye    In both eyes.  Associated symptoms include Negative for eye pain, redness,   flashes and floaters.  Pain was noted as 0/10.           Comments    S/p BULB. Patient states some tenderness and itching.  JAG Bosch July 10, 2023 10:56 AM          Assessment & Plan     Maylin Ni is a 63 year old female with the following diagnoses:   1. Postoperative eye state         Maylin Ni is 3 weeks status post BULB  The incision(s) are healing well.  The lid(s)  are  in excellent position.    I have recommended:  * Continue antibiotic ointment or bland lubricating ointment (eg vaseline or aquaphor) to the incision site BID.  * Massage along the incision BID.  * Warm soaks QID until all edema and ecchymoses resolve  * Return to clinic in 3 months    Elena Bauman MD  Oculoplastic Surgery Fellow    Attending Physician Attestation:  I have seen and examined this patient with the fellow .  I have confirmed and edited as necessary the chief complaint(s), history of present illness, review of systems, relevant history, and examination findings as documented by others.  I have personally reviewed the relevant tests, images, and reports as documented above.  I have confirmed and edited as necessary the assessment and plan and agree with this note.    - Home Ritchie MD 11:56 AM 7/10/2023

## 2023-07-10 NOTE — NURSING NOTE
Chief Complaints and History of Present Illnesses   Patient presents with     Follow Up For Surgery Of Eye     Chief Complaint(s) and History of Present Illness(es)     Follow Up For Surgery Of Eye            Laterality: both eyes    Associated symptoms: Negative for eye pain, redness, flashes and floaters    Pain scale: 0/10          Comments    S/p BULB. Patient states some tenderness and itching.  JAG Bosch July 10, 2023 10:56 AM

## 2023-08-17 ENCOUNTER — TRANSFERRED RECORDS (OUTPATIENT)
Dept: HEALTH INFORMATION MANAGEMENT | Facility: CLINIC | Age: 63
End: 2023-08-17
Payer: COMMERCIAL

## 2023-09-15 ENCOUNTER — OFFICE VISIT (OUTPATIENT)
Dept: OPHTHALMOLOGY | Facility: CLINIC | Age: 63
End: 2023-09-15
Payer: COMMERCIAL

## 2023-09-15 DIAGNOSIS — Z98.890 POSTOPERATIVE EYE STATE: Primary | ICD-10-CM

## 2023-09-15 PROCEDURE — 99024 POSTOP FOLLOW-UP VISIT: CPT | Mod: GC | Performed by: OPHTHALMOLOGY

## 2023-09-15 ASSESSMENT — REFRACTION_WEARINGRX
OS_AXIS: 003
OD_AXIS: 180
OD_CYLINDER: +0.50
OS_ADD: +2.25
OD_ADD: +2.25
OD_SPHERE: -1.00
SPECS_TYPE: PAL
OS_SPHERE: -1.00
OS_CYLINDER: +0.50

## 2023-09-15 ASSESSMENT — VISUAL ACUITY
METHOD: SNELLEN - LINEAR
OD_CC: 20/20
CORRECTION_TYPE: GLASSES
OS_CC: 20/20

## 2023-09-15 ASSESSMENT — EXTERNAL EXAM - RIGHT EYE: OD_EXAM: 1+ BROW PTOSIS

## 2023-09-15 ASSESSMENT — CONF VISUAL FIELD
OD_SUPERIOR_TEMPORAL_RESTRICTION: 0
OS_SUPERIOR_TEMPORAL_RESTRICTION: 0
OS_INFERIOR_NASAL_RESTRICTION: 0
OD_INFERIOR_NASAL_RESTRICTION: 0
OD_INFERIOR_TEMPORAL_RESTRICTION: 0
OD_NORMAL: 1
METHOD: COUNTING FINGERS
OS_SUPERIOR_NASAL_RESTRICTION: 0
OD_SUPERIOR_NASAL_RESTRICTION: 0
OS_INFERIOR_TEMPORAL_RESTRICTION: 0
OS_NORMAL: 1

## 2023-09-15 ASSESSMENT — TONOMETRY
OD_IOP_MMHG: 16
OS_IOP_MMHG: 18
IOP_METHOD: ICARE

## 2023-09-15 ASSESSMENT — SLIT LAMP EXAM - LIDS
COMMENTS: NORMAL
COMMENTS: NORMAL

## 2023-09-15 ASSESSMENT — EXTERNAL EXAM - LEFT EYE: OS_EXAM: 1+ BROW PTOSIS

## 2023-09-15 NOTE — NURSING NOTE
Chief Complaints and History of Present Illnesses   Patient presents with    Post Op (Ophthalmology) Both Eyes     Postop bilateral upper eyelid blepharoplasty.      Chief Complaint(s) and History of Present Illness(es)       Post Op (Ophthalmology) Both Eyes              Laterality: both eyes    Associated symptoms: swelling.  Negative for eye pain, redness, tearing and photophobia    Treatments tried: no treatments    Pain scale: 0/10    Comments: Postop bilateral upper eyelid blepharoplasty.              Comments    Eye meds: none  No concerns of vision or eyelid alignment today.  Peripheral vision left eye has improved.  Feels a stitch outside corner left eye.  Feels swelling inner corner left eye.    JERZY Burleson 9/15/2023 10:09 AM

## 2023-09-15 NOTE — PROGRESS NOTES
Chief Complaints and History of Present Illnesses   Patient presents with    Post Op (Ophthalmology) Both Eyes     Postop bilateral upper eyelid blepharoplasty.     Chief Complaint(s) and History of Present Illness(es)     Post Op (Ophthalmology) Both Eyes    In both eyes.  Associated symptoms include swelling.  Negative for eye   pain, redness, tearing and photophobia.  Treatments tried include no   treatments.  Pain was noted as 0/10. Additional comments: Postop bilateral   upper eyelid blepharoplasty.           Comments    Eye meds: none  No concerns of vision or eyelid alignment today.  Peripheral vision left eye has improved.  Feels a stitch outside corner left eye.  Feels swelling inner corner left eye.    JERZY Burleson 9/15/2023 10:09 AM         Assessment & Plan     Maylin Ni is a 63 year old female with the following diagnoses:   1. Postoperative eye state         Maylin Ni is 3 months status post BULB  The incision(s) are healing well.  The lid(s)  are  in excellent position.    I have recommended:  * AT and massage along incision   * Follow up as needed           Elena Bauman MD  Oculoplastic Surgery Fellow    Attending Physician Attestation:  I have seen and examined this patient with the fellow .  I have confirmed and edited as necessary the chief complaint(s), history of present illness, review of systems, relevant history, and examination findings as documented by others.  I have personally reviewed the relevant tests, images, and reports as documented above.  I have confirmed and edited as necessary the assessment and plan and agree with this note.    - Home Ritchie MD 10:45 AM 9/15/2023

## 2023-10-24 NOTE — MR AVS SNAPSHOT
After Visit Summary   10/6/2018    Maylin Ni    MRN: [id number]           Patient Information     Date Of Birth          1960        Visit Information        Provider Department      10/6/2018 9:40 AM Migdalia Roberts PA-C [facility]        Today's Diagnoses     Lower resp. tract infection    -  1    Acute pharyngitis, unspecified etiology           Follow-ups after your visit        Who to contact     If you have questions or need follow up information about today's clinic visit or your schedule please contact [facility] directly at [phone].  Normal or non-critical lab and imaging results will be communicated to you by [employer], letter or phone within 4 business days after the clinic has received the results. If you do not hear from us within 7 days, please contact the clinic through [employer] or phone. If you have a critical or abnormal lab result, we will notify you by phone as soon as possible.  Submit refill requests through [employer] or call your pharmacy and they will forward the refill request to us. Please allow 3 business days for your refill to be completed.          Additional Information About Your Visit        MyChart Information     [employer] gives you secure access to your electronic health record. If you see a primary care provider, you can also send messages to your care team and make appointments. If you have questions, please call your primary care clinic.  If you do not have a primary care provider, please call [phone] and they will assist you.        Care EveryWhere ID     This is your Care EveryWhere ID. This could be used by other organizations to access your [place] medical records  [id number]        Your Vitals Were     Pulse Temperature Respirations Pulse Oximetry BMI (Body Mass Index)       104 99.5  F (37.5  C) (Oral) 12 97% 40.1 kg/m2        Blood Pressure from Last 3 Encounters:   10/06/18 144/84   04/07/17 (!)  Suturing the pocket is complete. 138/94   05/29/12 122/80    Weight from Last 3 Encounters:   10/06/18 256 lb (116.1 kg)   04/07/17 252 lb (114.3 kg)   05/29/12 225 lb (102.1 kg)              We Performed the Following     Beta strep group A culture     Strep, Rapid Screen          Today's Medication Changes          These changes are accurate as of 10/6/18 11:59 PM.  If you have any questions, ask your nurse or doctor.               Start taking these medicines.        Dose/Directions    albuterol 108 (90 Base) MCG/ACT inhaler   Commonly known as:  PROAIR HFA/PROVENTIL HFA/VENTOLIN HFA   Used for:  Lower resp. tract infection        Dose:  2 puff   Inhale 2 puffs into the lungs every 4 hours as needed for shortness of breath / dyspnea   Quantity:  1 Inhaler   Refills:  0       amoxicillin-clavulanate 875-125 MG per tablet   Commonly known as:  AUGMENTIN   Used for:  Lower resp. tract infection        Dose:  1 tablet   Take 1 tablet by mouth 2 times daily   Quantity:  20 tablet   Refills:  0       predniSONE 20 MG tablet   Commonly known as:  DELTASONE   Used for:  Lower resp. tract infection        Dose:  40 mg   Take 2 tablets (40 mg) by mouth daily for 5 days   Quantity:  10 tablet   Refills:  0            Where to get your medicines      These medications were sent to [id number]  LOIDA HERNÁNDEZ - [address]  [address], [place] [address]     Phone:  [phone]     albuterol 108 (90 Base) MCG/ACT inhaler    amoxicillin-clavulanate 875-125 MG per tablet    predniSONE 20 MG tablet                Primary Care Provider Office Phone # Fax #    Janeth Dayanara Billingsley [phone]       [facility] [address] [place] [address] DR SARWAT GOMEZ [facility] [address]        Equal Access to Services     JHONNY JACOBS AH: Sylwia Marmolejo, katherine gray, qahandyta kaaljoshua isaac, urszula smith. So [facility] [phone].    ATENCIÓN: blanquita Finn  gilmore disposición servicios gratuitos de asistencia lingüística. Hannah sanchez [phone].    We comply with applicable federal civil rights laws and [place] laws. We do not discriminate on the basis of race, color, national origin, age, disability, sex, sexual orientation, or gender identity.            Thank you!     Thank you for choosing [facility]  for your care. Our goal is always to provide you with excellent care. Hearing back from our patients is one way we can continue to improve our services. Please take a few minutes to complete the written survey that you may receive in the mail after your visit with us. Thank you!             Your Updated Medication List - Protect others around you: Learn how to safely use, store and throw away your medicines at www.disposemymeds.org.          This list is accurate as of 10/6/18 11:59 PM.  Always use your most recent med list.                   Brand Name Dispense Instructions for use Diagnosis    albuterol 108 (90 Base) MCG/ACT inhaler    PROAIR HFA/PROVENTIL HFA/VENTOLIN HFA    1 Inhaler    Inhale 2 puffs into the lungs every 4 hours as needed for shortness of breath / dyspnea    Lower resp. tract infection       amoxicillin-clavulanate 875-125 MG per tablet    AUGMENTIN    20 tablet    Take 1 tablet by mouth 2 times daily    Lower resp. tract infection       IBUPROFEN           lisinopril 20 MG tablet    PRINIVIL/ZESTRIL     Take 20 mg by mouth daily.        lisinopril-hydrochlorothiazide 20-25 MG per tablet    PRINZIDE/ZESTORETIC     Take 1 tablet by mouth daily.        predniSONE 20 MG tablet    DELTASONE    10 tablet    Take 2 tablets (40 mg) by mouth daily for 5 days    Lower resp. tract infection

## 2023-11-10 ENCOUNTER — TRANSFERRED RECORDS (OUTPATIENT)
Dept: HEALTH INFORMATION MANAGEMENT | Facility: CLINIC | Age: 63
End: 2023-11-10
Payer: COMMERCIAL

## 2023-11-11 ENCOUNTER — HOSPITAL ENCOUNTER (EMERGENCY)
Facility: CLINIC | Age: 63
Discharge: HOME | End: 2023-11-11
Attending: STUDENT IN AN ORGANIZED HEALTH CARE EDUCATION/TRAINING PROGRAM | Admitting: STUDENT IN AN ORGANIZED HEALTH CARE EDUCATION/TRAINING PROGRAM
Payer: COMMERCIAL

## 2023-11-11 ENCOUNTER — APPOINTMENT (OUTPATIENT)
Dept: CT IMAGING | Facility: CLINIC | Age: 63
End: 2023-11-11
Attending: STUDENT IN AN ORGANIZED HEALTH CARE EDUCATION/TRAINING PROGRAM
Payer: COMMERCIAL

## 2023-11-11 ENCOUNTER — APPOINTMENT (OUTPATIENT)
Dept: GENERAL RADIOLOGY | Facility: CLINIC | Age: 63
End: 2023-11-11
Attending: STUDENT IN AN ORGANIZED HEALTH CARE EDUCATION/TRAINING PROGRAM
Payer: COMMERCIAL

## 2023-11-11 VITALS
RESPIRATION RATE: 22 BRPM | HEART RATE: 93 BPM | TEMPERATURE: 97.5 F | OXYGEN SATURATION: 100 % | SYSTOLIC BLOOD PRESSURE: 131 MMHG | DIASTOLIC BLOOD PRESSURE: 56 MMHG

## 2023-11-11 DIAGNOSIS — M25.551 RIGHT HIP PAIN: ICD-10-CM

## 2023-11-11 DIAGNOSIS — R00.1 VAGAL BRADYCARDIA: ICD-10-CM

## 2023-11-11 LAB
ANION GAP SERPL CALCULATED.3IONS-SCNC: 8 MMOL/L (ref 7–15)
ATRIAL RATE - MUSE: 56 BPM
BASOPHILS # BLD AUTO: 0 10E3/UL (ref 0–0.2)
BASOPHILS NFR BLD AUTO: 0 %
BUN SERPL-MCNC: 15.2 MG/DL (ref 8–23)
CALCIUM SERPL-MCNC: 9.1 MG/DL (ref 8.8–10.2)
CHLORIDE SERPL-SCNC: 102 MMOL/L (ref 98–107)
CREAT SERPL-MCNC: 0.66 MG/DL (ref 0.51–0.95)
CRP SERPL-MCNC: <3 MG/L
DEPRECATED HCO3 PLAS-SCNC: 26 MMOL/L (ref 22–29)
DIASTOLIC BLOOD PRESSURE - MUSE: NORMAL MMHG
EGFRCR SERPLBLD CKD-EPI 2021: >90 ML/MIN/1.73M2
EOSINOPHIL # BLD AUTO: 0 10E3/UL (ref 0–0.7)
EOSINOPHIL NFR BLD AUTO: 0 %
ERYTHROCYTE [DISTWIDTH] IN BLOOD BY AUTOMATED COUNT: 13.4 % (ref 10–15)
ERYTHROCYTE [SEDIMENTATION RATE] IN BLOOD BY WESTERGREN METHOD: 10 MM/HR (ref 0–30)
GLUCOSE SERPL-MCNC: 100 MG/DL (ref 70–99)
HCT VFR BLD AUTO: 32.5 % (ref 35–47)
HGB BLD-MCNC: 11 G/DL (ref 11.7–15.7)
IMM GRANULOCYTES # BLD: 0 10E3/UL
IMM GRANULOCYTES NFR BLD: 0 %
INTERPRETATION ECG - MUSE: NORMAL
LYMPHOCYTES # BLD AUTO: 1 10E3/UL (ref 0.8–5.3)
LYMPHOCYTES NFR BLD AUTO: 11 %
MCH RBC QN AUTO: 29.1 PG (ref 26.5–33)
MCHC RBC AUTO-ENTMCNC: 33.8 G/DL (ref 31.5–36.5)
MCV RBC AUTO: 86 FL (ref 78–100)
MONOCYTES # BLD AUTO: 0.4 10E3/UL (ref 0–1.3)
MONOCYTES NFR BLD AUTO: 4 %
NEUTROPHILS # BLD AUTO: 7.7 10E3/UL (ref 1.6–8.3)
NEUTROPHILS NFR BLD AUTO: 85 %
NRBC # BLD AUTO: 0 10E3/UL
NRBC BLD AUTO-RTO: 0 /100
P AXIS - MUSE: 42 DEGREES
PLATELET # BLD AUTO: 290 10E3/UL (ref 150–450)
POTASSIUM SERPL-SCNC: 3.8 MMOL/L (ref 3.4–5.3)
PR INTERVAL - MUSE: 148 MS
QRS DURATION - MUSE: 84 MS
QT - MUSE: 440 MS
QTC - MUSE: 424 MS
R AXIS - MUSE: -4 DEGREES
RBC # BLD AUTO: 3.78 10E6/UL (ref 3.8–5.2)
SODIUM SERPL-SCNC: 136 MMOL/L (ref 135–145)
SYSTOLIC BLOOD PRESSURE - MUSE: NORMAL MMHG
T AXIS - MUSE: 4 DEGREES
TROPONIN T BLD-MCNC: 0.01 UG/L
TROPONIN T SERPL HS-MCNC: 8 NG/L
TROPONIN T SERPL HS-MCNC: 8 NG/L
VENTRICULAR RATE- MUSE: 56 BPM
WBC # BLD AUTO: 9.1 10E3/UL (ref 4–11)

## 2023-11-11 PROCEDURE — 85652 RBC SED RATE AUTOMATED: CPT | Performed by: STUDENT IN AN ORGANIZED HEALTH CARE EDUCATION/TRAINING PROGRAM

## 2023-11-11 PROCEDURE — 84484 ASSAY OF TROPONIN QUANT: CPT | Performed by: STUDENT IN AN ORGANIZED HEALTH CARE EDUCATION/TRAINING PROGRAM

## 2023-11-11 PROCEDURE — 80048 BASIC METABOLIC PNL TOTAL CA: CPT | Performed by: STUDENT IN AN ORGANIZED HEALTH CARE EDUCATION/TRAINING PROGRAM

## 2023-11-11 PROCEDURE — 74177 CT ABD & PELVIS W/CONTRAST: CPT | Mod: XS

## 2023-11-11 PROCEDURE — 84484 ASSAY OF TROPONIN QUANT: CPT

## 2023-11-11 PROCEDURE — 250N000011 HC RX IP 250 OP 636: Mod: JZ | Performed by: STUDENT IN AN ORGANIZED HEALTH CARE EDUCATION/TRAINING PROGRAM

## 2023-11-11 PROCEDURE — 85025 COMPLETE CBC W/AUTO DIFF WBC: CPT | Performed by: STUDENT IN AN ORGANIZED HEALTH CARE EDUCATION/TRAINING PROGRAM

## 2023-11-11 PROCEDURE — 36415 COLL VENOUS BLD VENIPUNCTURE: CPT | Performed by: STUDENT IN AN ORGANIZED HEALTH CARE EDUCATION/TRAINING PROGRAM

## 2023-11-11 PROCEDURE — 96374 THER/PROPH/DIAG INJ IV PUSH: CPT | Mod: 59 | Performed by: STUDENT IN AN ORGANIZED HEALTH CARE EDUCATION/TRAINING PROGRAM

## 2023-11-11 PROCEDURE — 250N000009 HC RX 250: Performed by: STUDENT IN AN ORGANIZED HEALTH CARE EDUCATION/TRAINING PROGRAM

## 2023-11-11 PROCEDURE — 99284 EMERGENCY DEPT VISIT MOD MDM: CPT | Mod: 25 | Performed by: STUDENT IN AN ORGANIZED HEALTH CARE EDUCATION/TRAINING PROGRAM

## 2023-11-11 PROCEDURE — 93010 ELECTROCARDIOGRAM REPORT: CPT | Performed by: STUDENT IN AN ORGANIZED HEALTH CARE EDUCATION/TRAINING PROGRAM

## 2023-11-11 PROCEDURE — 86140 C-REACTIVE PROTEIN: CPT | Performed by: STUDENT IN AN ORGANIZED HEALTH CARE EDUCATION/TRAINING PROGRAM

## 2023-11-11 PROCEDURE — 71045 X-RAY EXAM CHEST 1 VIEW: CPT

## 2023-11-11 PROCEDURE — 999N000127 HC STATISTIC PERIPHERAL IV START W US GUIDANCE

## 2023-11-11 PROCEDURE — 96375 TX/PRO/DX INJ NEW DRUG ADDON: CPT | Mod: 59 | Performed by: STUDENT IN AN ORGANIZED HEALTH CARE EDUCATION/TRAINING PROGRAM

## 2023-11-11 PROCEDURE — 999N000040 HC STATISTIC CONSULT NO CHARGE VASC ACCESS

## 2023-11-11 PROCEDURE — 93005 ELECTROCARDIOGRAM TRACING: CPT | Mod: RTG

## 2023-11-11 PROCEDURE — 250N000013 HC RX MED GY IP 250 OP 250 PS 637: Performed by: STUDENT IN AN ORGANIZED HEALTH CARE EDUCATION/TRAINING PROGRAM

## 2023-11-11 PROCEDURE — 999N000285 HC STATISTIC VASC ACCESS LAB DRAW WITH PIV START

## 2023-11-11 PROCEDURE — 99285 EMERGENCY DEPT VISIT HI MDM: CPT | Mod: 25 | Performed by: STUDENT IN AN ORGANIZED HEALTH CARE EDUCATION/TRAINING PROGRAM

## 2023-11-11 PROCEDURE — 74177 CT ABD & PELVIS W/CONTRAST: CPT

## 2023-11-11 RX ORDER — ONDANSETRON 2 MG/ML
4 INJECTION INTRAMUSCULAR; INTRAVENOUS EVERY 30 MIN PRN
Status: DISCONTINUED | OUTPATIENT
Start: 2023-11-11 | End: 2023-11-11 | Stop reason: HOSPADM

## 2023-11-11 RX ORDER — IOPAMIDOL 755 MG/ML
125 INJECTION, SOLUTION INTRAVASCULAR ONCE
Status: DISCONTINUED | OUTPATIENT
Start: 2023-11-11 | End: 2023-11-11 | Stop reason: HOSPADM

## 2023-11-11 RX ORDER — IOPAMIDOL 755 MG/ML
125 INJECTION, SOLUTION INTRAVASCULAR ONCE
Status: COMPLETED | OUTPATIENT
Start: 2023-11-11 | End: 2023-11-11

## 2023-11-11 RX ORDER — OXYCODONE HYDROCHLORIDE 5 MG/1
10 TABLET ORAL ONCE
Status: COMPLETED | OUTPATIENT
Start: 2023-11-11 | End: 2023-11-11

## 2023-11-11 RX ORDER — MAGNESIUM HYDROXIDE/ALUMINUM HYDROXICE/SIMETHICONE 120; 1200; 1200 MG/30ML; MG/30ML; MG/30ML
30 SUSPENSION ORAL ONCE
Status: COMPLETED | OUTPATIENT
Start: 2023-11-11 | End: 2023-11-11

## 2023-11-11 RX ORDER — OXYCODONE HYDROCHLORIDE 5 MG/1
5 TABLET ORAL EVERY 6 HOURS PRN
Qty: 12 TABLET | Refills: 0 | Status: SHIPPED | OUTPATIENT
Start: 2023-11-11 | End: 2023-11-14

## 2023-11-11 RX ORDER — MORPHINE SULFATE 4 MG/ML
4 INJECTION, SOLUTION INTRAMUSCULAR; INTRAVENOUS ONCE
Status: COMPLETED | OUTPATIENT
Start: 2023-11-11 | End: 2023-11-11

## 2023-11-11 RX ORDER — LIDOCAINE 4 G/G
2 PATCH TOPICAL
Status: DISCONTINUED | OUTPATIENT
Start: 2023-11-12 | End: 2023-11-11 | Stop reason: HOSPADM

## 2023-11-11 RX ORDER — SODIUM CHLORIDE 9 MG/ML
INJECTION, SOLUTION INTRAVENOUS
Status: DISCONTINUED
Start: 2023-11-11 | End: 2023-11-11 | Stop reason: WASHOUT

## 2023-11-11 RX ADMIN — MORPHINE SULFATE 4 MG: 4 INJECTION INTRAVENOUS at 08:05

## 2023-11-11 RX ADMIN — ALUMINUM HYDROXIDE, MAGNESIUM HYDROXIDE, AND SIMETHICONE 30 ML: 200; 200; 20 SUSPENSION ORAL at 09:33

## 2023-11-11 RX ADMIN — IOPAMIDOL 112 ML: 755 INJECTION, SOLUTION INTRAVENOUS at 10:46

## 2023-11-11 RX ADMIN — MORPHINE SULFATE 4 MG: 4 INJECTION INTRAVENOUS at 08:31

## 2023-11-11 RX ADMIN — OXYCODONE HYDROCHLORIDE 10 MG: 5 TABLET ORAL at 11:28

## 2023-11-11 RX ADMIN — SODIUM CHLORIDE 75 ML: 9 INJECTION, SOLUTION INTRAVENOUS at 11:05

## 2023-11-11 RX ADMIN — ONDANSETRON 4 MG: 2 INJECTION INTRAMUSCULAR; INTRAVENOUS at 08:05

## 2023-11-11 ASSESSMENT — ACTIVITIES OF DAILY LIVING (ADL)
ADLS_ACUITY_SCORE: 35
ADLS_ACUITY_SCORE: 35

## 2023-11-11 NOTE — ED PROVIDER NOTES
ED Provider Note  [facility]      History     Chief Complaint   Patient presents with    Hip Pain     HPI  Maylin Ni is a 63 year old female with a past medical history of obesity, hypertension, previous adrenal adenoma and bowel obstruction, right hip bursitis presents emerged department due to 4 days of worsening right hip pain.    Patient notes that she was recently on a mission trip to [place], and started noticing some increasing pain in her right hip where she is previously had bursitis.  Patient attributes this to sleeping on a cot there.    Notes that it was uncomfortable on the plane ride back, and was severe to where she was unable to sleep the last 2 nights until yesterday when she went to [facility] urgent care due to significant worsening pain.  She was diagnosed with bursitis, and underwent a steroid injection which did help somewhat and she was able to sleep last night.  However when she attempted to get out of bed and move her right leg this morning she noted severe pain causing her to take a couple of minutes to even get out of bed and into the car this morning.  On arrival to work she noted severe worsening pain and this was associated with nausea, and vomiting.    No fevers or chills.  No abdominal pain, but she does have pain in her right lower back radiating into her right hip, glutes, sacrum, and her right groin.  No diarrhea or constipation.  No dysuria or urinary frequency.  No hematuria.  No loss of bowel or bladder control, no numbness, tingling or weakness of her leg    Past Medical History  Past Medical History:   Diagnosis Date    Endometriosis     H/O colonoscopy 07/31/2020    nl, repeat 5 years for polyp surveillance    Hypertension     Hypertension     Normal delivery     x3, 1986, 1988, 1991    Personal history of asthma     had yearly asthmatic bronchitis but last one about 2010    Tubular adenoma 04/2015    found on colonoscopy, repeat in 5 yrs     Unspecified intestinal obstruction 10/2012    small bowel obstruction, thought to be secondary to raw carrots and apples     Past Surgical History:   Procedure Laterality Date    APPENDECTOMY  01/01/1980    with R ovary, fallopian tube surgery    BLEPHAROPLASTY BILATERAL Bilateral 6/21/2023    Procedure: bilateral upper eyelid blepharoplasty;  Surgeon: Home Ritchie MD;  Location: [facility] OR    CHOLECYSTECTOMY  01/01/1997    Laparoscopic    COLONOSCOPY  Every 5 years- due in 2024    hysterectomy N/A     HYSTERECTOMY  01/01/2012    has left ovary remaining    HYSTERECTOMY, PAP NO LONGER INDICATED      OOPHORECTOMY Right     1 removed, 1 remains    OTHER SURGICAL HISTORY Right 01/01/1980    oophorectomyR ovary and fallopian tube removed, endometriosis    OTHER SURGICAL HISTORY Right 08/01/2019    [facility]    [facility]       erythromycin (ROMYCIN) 5 MG/GM ophthalmic ointment  lisinopril-hydrochlorothiazide (ZESTORETIC) 20-12.5 MG tablet  Multiple Vitamins-Minerals (HM MULTIVITAMIN ADULT GUMMY) CHEW      No Known Allergies  Family History  Family History   Problem Relation Age of Onset    Cancer Mother     Colon Cancer Mother         Survivor x 22years    Hypertension Mother     Osteopenia Mother     Lupus Mother     Hypertension Father     Cerebrovascular Disease Father 59        carotid endarterectomy    Heart Disease Father         MI, sudden cardiac death    Coronary Artery Disease Father     Uterine Cancer Sister         leiomyosarcoma    Breast Cancer Paternal Grandmother     Other Cancer Sister         Liomyosarcoma    Glaucoma No family hx of     Macular Degeneration No family hx of      Social History   Social History     Tobacco Use    Smoking status: Never    Smokeless tobacco: Never   Vaping Use    Vaping Use: Never used   Substance Use Topics    Alcohol use: Yes     Comment: 1-2 drinks per week    Drug use: Never         A medically appropriate  review of systems was performed with pertinent positives and negatives noted in the HPI, and all other systems negative.    Physical Exam   BP: (!) 171/97  Pulse: 92  Temp: 97.5  F (36.4  C)  Resp: 22  SpO2: 100 %  Physical Exam  GEN: Uncomfortable, non toxic, cooperative  HEENT: normocephalic and atraumatic, PERRLA, EOMI  CV: well-perfused, normal skin color for ethnicity, regular rate and rhythm  PULM: breathing comfortably, in no respiratory distress  ABD: nondistended, soft, nontender  EXT:   Right hip:  Passive range of motion relatively preserved with normal abduction of the right hip, somewhat limited abduction, somewhat limited flexion, but full extension  Active range of motion limited due to significant pain on flexion, and abduction of the right hip   Tenderness to palpation of the right lateral hip, right sacral region without overlying swelling appreciated   Mild right groin tenderness on palpation without any hernia defects appreciated   Back: No midline cervical, thoracic or lumbar tenderness.  Some right paraspinal tenderness, no CVA tenderness  NEURO: awake, conversant, grossly normal bilateral upper and lower extremity strength & ROM; knee flexion and extension, ankle flexion and extension, and great toe flexion and extension all preserved.  Normal sensation bilateral lower extremities  SKIN: No rashes, ecchymosis, or lacerations  PSYCH: Calm and cooperative, interactive    ED Course, Procedures, & Data      Procedures          Results for orders placed or performed during the hospital encounter of 11/11/23   XR Chest Port 1 View     Status: None    Narrative    EXAM: XR CHEST PORT 1 VIEW  LOCATION: [facility]  DATE: 11/11/2023    INDICATION: chest pain  COMPARISON: 05/31/2022      Impression    IMPRESSION: Shallower inspiration. Lungs are clear. Heart and pulmonary vascularity are normal. No signs of acute disease.   CT Aortic Survey w Contrast      Status: None    Narrative    EXAM: CT AORTIC SURVEY WITH CONTRAST  LOCATION: [facility]  DATE: 11/11/2023    INDICATION: Severe right groin pain. Back pain. Chest pain.  COMPARISON: None.  TECHNIQUE: CT angiogram chest abdomen pelvis during arterial phase of injection of IV contrast. 2D and 3D MIP reconstructions were performed by the CT technologist. Dose reduction techniques were used.   CONTRAST: 112 mL Isovue 370.    FINDINGS:   CT ANGIOGRAM CHEST, ABDOMEN, AND PELVIS: Normal caliber aorta without dissection. No significant visualized atherosclerotic changes in the aorta and its branches.    LUNGS AND PLEURA: No infiltrates or effusions.    MEDIASTINUM/AXILLAE: No adenopathy.    CORONARY ARTERY CALCIFICATION: None.    HEPATOBILIARY: No significant mass or bile duct dilatation. No calcified gallstones.     PANCREAS: No significant mass, duct dilatation, or inflammatory change.    SPLEEN: Normal size.    ADRENAL GLANDS: Mild bilateral adrenal thickening.    KIDNEYS/BLADDER: No significant mass, stone, or hydronephrosis.    BOWEL: No obstruction or inflammatory change.    LYMPH NODES: No lymphadenopathy.    PELVIC ORGANS: Hysterectomy.    MUSCULOSKELETAL: No frankly destructive bony lesions. No significant abnormality in the right hip or right pelvis demonstrated. No fracture. Joint space well maintained with little if any degenerative change present. Proximal femurs appear unremarkable.      Impression    IMPRESSION:  1.  No acute process demonstrated.     CT Abdomen Pelvis w Contrast     Status: None    Narrative    EXAM: CT ABDOMEN AND PELVIS WITH CONTRAST  LOCATION: [facility]  DATE: 11/11/2023    INDICATION: Right flank/abdominal pain. Right inguinal pain.  COMPARISON: None.  TECHNIQUE: CT scan of the abdomen and pelvis was performed following injection of IV contrast. Multiplanar reformats were obtained. Dose reduction  techniques were used.  CONTRAST: 112 mL Isovue 370.    FINDINGS:   LOWER CHEST: No infiltrates or effusions.    HEPATOBILIARY: Cholecystectomy. Low-attenuation subcentimeter liver lesion(s) compatible with benign cysts or other benign lesions. No routine follow-up is recommended in a low-risk patient. No enhancing lesions.    PANCREAS: No significant mass, duct dilatation, or inflammatory change.    SPLEEN: Normal size.    ADRENAL GLANDS: Mild adrenal thickening.    KIDNEYS/BLADDER: No significant mass, stone, or hydronephrosis. Low-attenuation subcentimeter renal lesion(s) compatible with benign cysts or other benign lesions. No routine follow-up is recommended in a low-risk patient.    BOWEL: No obstruction or inflammatory change.    LYMPH NODES: No lymphadenopathy.    VASCULATURE: No abdominal aortic aneurysm.    PELVIC ORGANS: Hysterectomy.    MUSCULOSKELETAL: No frankly destructive bony lesions. No fractures.      Impression    IMPRESSION:   1.  No acute process demonstrated.       Basic metabolic panel     Status: Abnormal   Result Value Ref Range    Sodium 136 135 - 145 mmol/L    Potassium 3.8 3.4 - 5.3 mmol/L    Chloride 102 98 - 107 mmol/L    Carbon Dioxide (CO2) 26 22 - 29 mmol/L    Anion Gap 8 7 - 15 mmol/L    Urea Nitrogen 15.2 8.0 - 23.0 mg/dL    Creatinine 0.66 0.51 - 0.95 mg/dL    GFR Estimate >90 >60 mL/min/1.73m2    Calcium 9.1 8.8 - 10.2 mg/dL    Glucose 100 (H) 70 - 99 mg/dL   Erythrocyte sedimentation rate auto     Status: Normal   Result Value Ref Range    Erythrocyte Sedimentation Rate 10 0 - 30 mm/hr   CRP inflammation     Status: Normal   Result Value Ref Range    CRP Inflammation <3.00 <5.00 mg/L   CBC with platelets and differential     Status: Abnormal   Result Value Ref Range    WBC Count 9.1 4.0 - 11.0 10e3/uL    RBC Count 3.78 (L) 3.80 - 5.20 10e6/uL    Hemoglobin 11.0 (L) 11.7 - 15.7 g/dL    Hematocrit 32.5 (L) 35.0 - 47.0 %    MCV 86 78 - 100 fL    MCH 29.1 26.5 - 33.0 pg    MCHC 33.8  31.5 - 36.5 g/dL    RDW 13.4 10.0 - 15.0 %    Platelet Count 290 150 - 450 10e3/uL    % Neutrophils 85 %    % Lymphocytes 11 %    % Monocytes 4 %    % Eosinophils 0 %    % Basophils 0 %    % Immature Granulocytes 0 %    NRBCs per 100 WBC 0 <1 /100    Absolute Neutrophils 7.7 1.6 - 8.3 10e3/uL    Absolute Lymphocytes 1.0 0.8 - 5.3 10e3/uL    Absolute Monocytes 0.4 0.0 - 1.3 10e3/uL    Absolute Eosinophils 0.0 0.0 - 0.7 10e3/uL    Absolute Basophils 0.0 0.0 - 0.2 10e3/uL    Absolute Immature Granulocytes 0.0 <=0.4 10e3/uL    Absolute NRBCs 0.0 10e3/uL   Troponin T, High Sensitivity     Status: Normal   Result Value Ref Range    Troponin T, High Sensitivity 8 <=14 ng/L   Troponin T, High Sensitivity     Status: Normal   Result Value Ref Range    Troponin T, High Sensitivity 8 <=14 ng/L   iStat Troponin, POCT     Status: Normal   Result Value Ref Range    TROPPC POCT 0.01 <=0.12 ug/L   EKG 12-lead, tracing only     Status: None (Preliminary result)   Result Value Ref Range    Systolic Blood Pressure  mmHg    Diastolic Blood Pressure  mmHg    Ventricular Rate 56 BPM    Atrial Rate 56 BPM    MS Interval 148 ms    QRS Duration 84 ms     ms    QTc 424 ms    P Axis 42 degrees    R AXIS -4 degrees    T Axis 4 degrees    Interpretation ECG       Sinus bradycardia  Possible Left atrial enlargement  Possible Anterior infarct , age undetermined  Abnormal ECG     CBC with platelets differential     Status: Abnormal    Narrative    The following orders were created for panel order CBC with platelets differential.  Procedure                               Abnormality         Status                     ---------                               -----------         ------                     CBC with platelets and d...[[id number]]  Abnormal            Final result                 Please view results for these tests on the individual orders.     Medications   ondansetron (ZOFRAN) injection 4 mg (4 mg Intravenous $Given 11/11/23 0805)    Lidocaine (LIDOCARE) 4 % Patch 2 patch (has no administration in time range)   iopamidol (ISOVUE-370) solution 125 mL ( Intravenous Canceled Entry 11/11/23 1100)   sodium chloride 100mL for CT scan flush use (has no administration in time range)   morphine (PF) injection 4 mg (4 mg Intravenous $Given 11/11/23 0805)   morphine (PF) injection 4 mg (4 mg Intravenous $Given 11/11/23 0831)   alum & mag hydroxide-simethicone (MAALOX) suspension 30 mL (30 mLs Oral $Given 11/11/23 0933)   iopamidol (ISOVUE-370) solution 125 mL (112 mLs Intravenous $Given 11/11/23 1046)   sodium chloride 100mL for CT scan flush use (75 mLs Intravenous $Given 11/11/23 1105)   oxyCODONE (ROXICODONE) tablet 10 mg (10 mg Oral $Given 11/11/23 1128)     Labs Ordered and Resulted from Time of ED Arrival to Time of ED Departure   BASIC METABOLIC PANEL - Abnormal       Result Value    Sodium 136      Potassium 3.8      Chloride 102      Carbon Dioxide (CO2) 26      Anion Gap 8      Urea Nitrogen 15.2      Creatinine 0.66      GFR Estimate >90      Calcium 9.1      Glucose 100 (*)    CBC WITH PLATELETS AND DIFFERENTIAL - Abnormal    WBC Count 9.1      RBC Count 3.78 (*)     Hemoglobin 11.0 (*)     Hematocrit 32.5 (*)     MCV 86      MCH 29.1      MCHC 33.8      RDW 13.4      Platelet Count 290      % Neutrophils 85      % Lymphocytes 11      % Monocytes 4      % Eosinophils 0      % Basophils 0      % Immature Granulocytes 0      NRBCs per 100 WBC 0      Absolute Neutrophils 7.7      Absolute Lymphocytes 1.0      Absolute Monocytes 0.4      Absolute Eosinophils 0.0      Absolute Basophils 0.0      Absolute Immature Granulocytes 0.0      Absolute NRBCs 0.0     ERYTHROCYTE SEDIMENTATION RATE AUTO - Normal    Erythrocyte Sedimentation Rate 10     CRP INFLAMMATION - Normal    CRP Inflammation <3.00     TROPONIN T, HIGH SENSITIVITY - Normal    Troponin T, High Sensitivity 8     TROPONIN T, HIGH SENSITIVITY - Normal    Troponin T, High Sensitivity 8      ISTAT TROPONIN POCT - Normal    TROPPC POCT 0.01     ISTAT TROPONIN POCT     CT Abdomen Pelvis w Contrast   Final Result   IMPRESSION:    1.  No acute process demonstrated.            CT Aortic Survey w Contrast   Final Result   IMPRESSION:   1.  No acute process demonstrated.         XR Chest Port 1 View   Final Result   IMPRESSION: Shallower inspiration. Lungs are clear. Heart and pulmonary vascularity are normal. No signs of acute disease.             Critical care was not performed.     Medical Decision Making  The patient's presentation was of moderate complexity (an acute complicated injury).    The patient's evaluation involved:  review of external note(s) from 3+ sources (see separate area of note for details)  review of 3+ test result(s) ordered prior to this encounter (see separate area of note for details)  ordering and/or review of 3+ test(s) in this encounter (see separate area of note for details)    The patient's management necessitated high risk (a parenteral controlled substance).    Assessment & Plan    63-year-old female with a past medical history of right hip bursitis, hypertension, previous adrenal adenoma and bowel obstruction presents emergency department due to right hip pain that has worsened over the last 4 days, and acutely worsened today with any movement of her right leg associated with significant nausea and vomiting in the setting of severe pain noted to be hemodynamically stable, with preserved range of motion passively but with limited range of motion actively of the right hip noted to have tenderness to palpation of the right paraspinal back, right lateral hip, and right groin.    Low suspicion of fracture with no significant traumatic injuries, patient had an x-ray done at her urgent care visit yesterday which she reports was normal with no fractures noted.  Considered septic joint, however relatively low suspicion for this with no fevers, no risk factors, preserved range of  motion passively.  As we are getting IV access in order to get of her antiemetics, and morphine, will plan for baseline lab work and inflammatory markers.  If she is able to ambulate with pain control here, I have lower suspicion further for septic joint as well as for any occult fracture.  Did discuss however if she is unable to feel better with these medications we would consider doing CT imaging.    Abdomen is very soft and nontender with very low suspicion of an acute obstructive pathology or evidence of any hernia defects.  She does have some mild right inguinal tenderness, but no palpable masses.  If this worsens, would at that time determine need for CT imaging and would likely image her abdomen as well.    12:17 PM patient became diaphoretic and had some central chest pressure as well as some burping sensation.  Heart rate was noted to drop down into the 50s, and EKG demonstrated sinus bradycardia.  After laying back she felt a little bit better, had an EKG that was normal, and had 2 troponins which were normal.  Low suspicion that this is ischemia at this time, however CT angiogram was obtained to ensure no evidence of any aortic dissection in light of her having this chest pain, diaphoresis and pain in her right hip.    No evidence of any aortic pathology, and otherwise CT abdomen and pelvis within normal limits.  Feeling much better now. Able to walk to the bathroom and back. Will DC with breakthrough pain meds and ortho follow-up as needed      I have reviewed the nursing notes. I have reviewed the findings, diagnosis, plan and need for follow up with the patient.    New Prescriptions    OXYCODONE (ROXICODONE) 5 MG TABLET    Take 1 tablet (5 mg) by mouth every 6 hours as needed       Final diagnoses:   Right hip pain   Vagal bradycardia       Silvia Rosenbaum MD  [facility] EMERGENCY DEPARTMENT  11/11/2023     Silvia Rosenbaum MD  11/11/23 8524

## 2023-11-11 NOTE — ED NOTES
Patioent called RN to room, stabbing feeling sharp mid sternal chest pain. Patient was perfusely sweating and pale when RN entered. Provider notified, in person assessment. EKG/labs completed.

## 2023-11-11 NOTE — ED TRIAGE NOTES
Pt has history of bursitis in right hip. Has been working on mission trip past week, last 4 days pain in right hip has increased. Received steroid injection yesterday at urgent care. Woke up today at 0400 with severe sharp burning pain, radiates from hip to groin down leg. Patient has been nauseous, dry heaving, sweating/flushed from pain.     Triage Assessment (Adult)       Row Name 11/11/23 0741          Triage Assessment    Airway WDL WDL        Respiratory WDL    Respiratory WDL WDL        Skin Circulation/Temperature WDL    Skin Circulation/Temperature WDL WDL        Cardiac WDL    Cardiac WDL WDL        Peripheral/Neurovascular WDL    Peripheral Neurovascular WDL WDL        Cognitive/Neuro/Behavioral WDL    Cognitive/Neuro/Behavioral WDL WDL

## 2023-11-11 NOTE — DISCHARGE INSTRUCTIONS
Your symptoms department due to pain in your hip.  You had labs done, CAT scans, all of which are normal.  You also began having some pain in your chest and felt diaphoretic, and nauseous.  We did not see anything wrong with your heart while you are here.    The pain in your hip is likely related to a bursitis that you were diagnosed with before.  We recommend continuing to do the conservative care that you have been including Tylenol, Motrin, and you were given oxycodone for breakthrough pain.  Follow-up with the orthopedic group who already prescribed physical therapy for you.     Return to Emergency Department with a worsening severe pain, feeling like you are going to pass out, difficulty breathing, severe chest pain or any other concerning symptoms.

## 2023-11-11 NOTE — ED NOTES
Patient able to ambulate with bearable pain. Patient plan to go home with spouse with rest and oral PO pain meds

## 2023-12-27 ENCOUNTER — TRANSFERRED RECORDS (OUTPATIENT)
Dept: HEALTH INFORMATION MANAGEMENT | Facility: CLINIC | Age: 63
End: 2023-12-27
Payer: COMMERCIAL

## 2024-01-03 ENCOUNTER — TRANSFERRED RECORDS (OUTPATIENT)
Dept: HEALTH INFORMATION MANAGEMENT | Facility: CLINIC | Age: 64
End: 2024-01-03
Payer: COMMERCIAL

## 2024-05-29 SDOH — HEALTH STABILITY: PHYSICAL HEALTH: ON AVERAGE, HOW MANY DAYS PER WEEK DO YOU ENGAGE IN MODERATE TO STRENUOUS EXERCISE (LIKE A BRISK WALK)?: 5 DAYS

## 2024-05-29 SDOH — HEALTH STABILITY: PHYSICAL HEALTH: ON AVERAGE, HOW MANY MINUTES DO YOU ENGAGE IN EXERCISE AT THIS LEVEL?: 40 MIN

## 2024-05-29 ASSESSMENT — ASTHMA QUESTIONNAIRES
QUESTION_2 LAST FOUR WEEKS HOW OFTEN HAVE YOU HAD SHORTNESS OF BREATH: NOT AT ALL
ACT_TOTALSCORE: 25
QUESTION_3 LAST FOUR WEEKS HOW OFTEN DID YOUR ASTHMA SYMPTOMS (WHEEZING, COUGHING, SHORTNESS OF BREATH, CHEST TIGHTNESS OR PAIN) WAKE YOU UP AT NIGHT OR EARLIER THAN USUAL IN THE MORNING: NOT AT ALL
QUESTION_1 LAST FOUR WEEKS HOW MUCH OF THE TIME DID YOUR ASTHMA KEEP YOU FROM GETTING AS MUCH DONE AT WORK, SCHOOL OR AT HOME: NONE OF THE TIME
QUESTION_4 LAST FOUR WEEKS HOW OFTEN HAVE YOU USED YOUR RESCUE INHALER OR NEBULIZER MEDICATION (SUCH AS ALBUTEROL): NOT AT ALL
ACT_TOTALSCORE: 25
QUESTION_5 LAST FOUR WEEKS HOW WOULD YOU RATE YOUR ASTHMA CONTROL: COMPLETELY CONTROLLED

## 2024-05-29 ASSESSMENT — SOCIAL DETERMINANTS OF HEALTH (SDOH): HOW OFTEN DO YOU GET TOGETHER WITH FRIENDS OR RELATIVES?: TWICE A WEEK

## 2024-06-03 ENCOUNTER — MYC MEDICAL ADVICE (OUTPATIENT)
Dept: PEDIATRICS | Facility: CLINIC | Age: 64
End: 2024-06-03

## 2024-06-03 ENCOUNTER — OFFICE VISIT (OUTPATIENT)
Dept: PEDIATRICS | Facility: CLINIC | Age: 64
End: 2024-06-03
Payer: COMMERCIAL

## 2024-06-03 ENCOUNTER — ANCILLARY PROCEDURE (OUTPATIENT)
Dept: MAMMOGRAPHY | Facility: CLINIC | Age: 64
End: 2024-06-03
Payer: COMMERCIAL

## 2024-06-03 VITALS
WEIGHT: 239.8 LBS | RESPIRATION RATE: 16 BRPM | HEIGHT: 65 IN | BODY MASS INDEX: 39.95 KG/M2 | TEMPERATURE: 98 F | HEART RATE: 74 BPM | DIASTOLIC BLOOD PRESSURE: 74 MMHG | SYSTOLIC BLOOD PRESSURE: 112 MMHG | OXYGEN SATURATION: 98 %

## 2024-06-03 DIAGNOSIS — Z12.11 COLON CANCER SCREENING: ICD-10-CM

## 2024-06-03 DIAGNOSIS — I10 ESSENTIAL HYPERTENSION: ICD-10-CM

## 2024-06-03 DIAGNOSIS — Z00.00 ENCOUNTER FOR SCREENING AND PREVENTATIVE CARE: ICD-10-CM

## 2024-06-03 DIAGNOSIS — Z00.00 ROUTINE GENERAL MEDICAL EXAMINATION AT A HEALTH CARE FACILITY: Primary | ICD-10-CM

## 2024-06-03 DIAGNOSIS — Z13.220 LIPID SCREENING: ICD-10-CM

## 2024-06-03 LAB
ALBUMIN SERPL BCG-MCNC: 4.5 G/DL (ref 3.5–5.2)
ALP SERPL-CCNC: 79 U/L (ref 40–150)
ALT SERPL W P-5'-P-CCNC: 25 U/L (ref 0–50)
ANION GAP SERPL CALCULATED.3IONS-SCNC: 9 MMOL/L (ref 7–15)
AST SERPL W P-5'-P-CCNC: 28 U/L (ref 0–45)
BILIRUB SERPL-MCNC: 0.6 MG/DL
BUN SERPL-MCNC: 9.8 MG/DL (ref 8–23)
CALCIUM SERPL-MCNC: 9.8 MG/DL (ref 8.8–10.2)
CHLORIDE SERPL-SCNC: 101 MMOL/L (ref 98–107)
CHOLEST SERPL-MCNC: 180 MG/DL
CREAT SERPL-MCNC: 0.69 MG/DL (ref 0.51–0.95)
DEPRECATED HCO3 PLAS-SCNC: 28 MMOL/L (ref 22–29)
EGFRCR SERPLBLD CKD-EPI 2021: >90 ML/MIN/1.73M2
FASTING STATUS PATIENT QL REPORTED: ABNORMAL
FASTING STATUS PATIENT QL REPORTED: NORMAL
GLUCOSE SERPL-MCNC: 96 MG/DL (ref 70–99)
HDLC SERPL-MCNC: 56 MG/DL
LDLC SERPL CALC-MCNC: 105 MG/DL
NONHDLC SERPL-MCNC: 124 MG/DL
POTASSIUM SERPL-SCNC: 3.6 MMOL/L (ref 3.4–5.3)
PROT SERPL-MCNC: 7.1 G/DL (ref 6.4–8.3)
SODIUM SERPL-SCNC: 138 MMOL/L (ref 135–145)
TRIGL SERPL-MCNC: 95 MG/DL

## 2024-06-03 PROCEDURE — 99213 OFFICE O/P EST LOW 20 MIN: CPT | Mod: 25 | Performed by: INTERNAL MEDICINE

## 2024-06-03 PROCEDURE — 80061 LIPID PANEL: CPT | Performed by: INTERNAL MEDICINE

## 2024-06-03 PROCEDURE — 99396 PREV VISIT EST AGE 40-64: CPT | Performed by: INTERNAL MEDICINE

## 2024-06-03 PROCEDURE — 77063 BREAST TOMOSYNTHESIS BI: CPT | Mod: TC | Performed by: RADIOLOGY

## 2024-06-03 PROCEDURE — 77067 SCR MAMMO BI INCL CAD: CPT | Mod: TC | Performed by: RADIOLOGY

## 2024-06-03 PROCEDURE — 80053 COMPREHEN METABOLIC PANEL: CPT | Performed by: INTERNAL MEDICINE

## 2024-06-03 PROCEDURE — 36415 COLL VENOUS BLD VENIPUNCTURE: CPT | Performed by: INTERNAL MEDICINE

## 2024-06-03 RX ORDER — LISINOPRIL AND HYDROCHLOROTHIAZIDE 12.5; 2 MG/1; MG/1
2 TABLET ORAL DAILY
Qty: 180 TABLET | Refills: 3 | Status: SHIPPED | OUTPATIENT
Start: 2024-06-03

## 2024-06-03 ASSESSMENT — PAIN SCALES - GENERAL: PAINLEVEL: NO PAIN (0)

## 2024-06-03 NOTE — PROGRESS NOTES
"Preventive Care Visit  [facility] Medina MD, Internal Medicine - Pediatrics  Pancho 3, 2024      Assessment & Plan     Routine general medical examination at a health care facility  Doing well overall.  Was on a glp1a for weight loss, but coverage changed and was too expensive without her deductible met.  Will make a separate appt if she wants to restart.    Essential hypertension  - Stable, no side effects with medications, refilled meds today  - lisinopril-hydrochlorothiazide (ZESTORETIC) 20-12.5 MG tablet; Take 2 tablets by mouth daily  - Comprehensive metabolic panel (BMP + Alb, Alk Phos, ALT, AST, Total. Bili, TP); Future  - Comprehensive metabolic panel (BMP + Alb, Alk Phos, ALT, AST, Total. Bili, TP)    Lipid screening  Due for fasting labs.  - Lipid panel reflex to direct LDL Fasting; Future  - Lipid panel reflex to direct LDL Fasting    Colon cancer screening  - Colonoscopy Screening  Referral; Future            BMI  Estimated body mass index is 39.47 kg/m  as calculated from the following:    Height as of this encounter: 1.66 m (5' 5.35\").    Weight as of this encounter: 108.8 kg (239 lb 12.8 oz).   Weight management plan: Discussed healthy diet and exercise guidelines    Counseling  Appropriate preventive services were discussed with this patient, including applicable screening as appropriate for fall prevention, nutrition, physical activity, Tobacco-use cessation, weight loss and cognition.  Checklist reviewing preventive services available has been given to the patient.  Reviewed patient's diet, addressing concerns and/or questions.       Patient Instructions   Preventive Care Advice   This is general advice we often give to help people stay healthy. Your care team may have specific advice just for you. Please talk to your care team about your own preventive care needs.  Lifestyle  Exercise at least 150 minutes each week (30 minutes a day, 5 days a week).  Do muscle " "strengthening activities 2 days a week. These help control your weight and prevent disease.  No smoking.  Wear sunscreen to prevent skin cancer.  Have your home tested for radon every 2 to 5 years. Radon is a colorless, odorless gas that can harm your lungs. To learn more, go to www.health.[facility].mn. and search for \"Radon in Homes.\"  Keep guns unloaded and locked up in a safe place like a safe or gun vault, or, use a gun lock and hide the keys. Always lock away bullets separately. To learn more, visit [employer].mn.gov and search for \"safe gun storage.\"  Nutrition  Eat 5 or more servings of fruits and vegetables each day.  Try wheat bread, brown rice and whole grain pasta (instead of white bread, rice, and pasta).  Get enough calcium and vitamin D. Check the label on foods and aim for 100% of the RDA (recommended daily allowance).  Regular exams  Have a dental exam and cleaning every 6 months.  See your health care team every year to talk about:  Any changes in your health.  Any medicines your care team has prescribed.  Preventive care, family planning, and ways to prevent chronic diseases.  Shots (vaccines)   HPV shots (up to age 26), if you've never had them before.  Hepatitis B shots (up to age 59), if you've never had them before.  COVID-19 shot: Get this shot when it's due.  Flu shot: Get a flu shot every year.  Tetanus shot: Get a tetanus shot every 10 years.  Pneumococcal, hepatitis A, and RSV shots: Ask your care team if you need these based on your risk.  Shingles shot (for age 50 and up).  General health tests  Diabetes screening:  Starting at age 35, Get screened for diabetes at least every 3 years.  If you are younger than age 35, ask your care team if you should be screened for diabetes.  Cholesterol test: At age 39, start having a cholesterol test every 5 years, or more often if advised.  Bone density scan (DEXA): At age 50, ask your care team if you should have this scan for osteoporosis (brittle " bones).  Hepatitis C: Get tested at least once in your life.  Abdominal aortic aneurysm screening: Talk to your doctor about having this screening if you:  Have ever smoked; and  Are biologically male; and  Are between the ages of 65 and 75.  STIs (sexually transmitted infections)  Before age 24: Ask your care team if you should be screened for STIs.  After age 24: Get screened for STIs if you're at risk. You are at risk for STIs (including HIV) if:  You are sexually active with more than one person.  You don't use condoms every time.  You or a partner was diagnosed with a sexually transmitted infection.  If you are at risk for HIV, ask about PrEP medicine to prevent HIV.  Get tested for HIV at least once in your life, whether you are at risk for HIV or not.  Cancer screening tests  Cervical cancer screening: If you have a cervix, begin getting regular cervical cancer screening tests at age 21. Most people who have regular screenings with normal results can stop after age 65. Talk about this with your provider.  Breast cancer scan (mammogram): If you've ever had breasts, begin having regular mammograms starting at age 40. This is a scan to check for breast cancer.  Colon cancer screening: It is important to start screening for colon cancer at age 45.  Have a colonoscopy test every 10 years (or more often if you're at risk) Or, ask your provider about stool tests like a FIT test every year or Cologuard test every 3 years.  To learn more about your testing options, visit: www.[employer]/056602.pdf.  For help making a decision, visit: stephanie/gh71319.  Prostate cancer screening test: If you have a prostate and are age 55 to 69, ask your provider if you would benefit from a yearly prostate cancer screening test.  Lung cancer screening: If you are a current or former smoker age 50 to 80, ask your care team if ongoing lung cancer screenings are right for you.  For informational purposes only. Not to replace the advice of  your health care provider. Copyright   2023 [facility]. All rights reserved. Clinically reviewed by the [facility] Transitions Program. [employer] 350562 - REV 04/24.       Yadira Cody is a 63 year old, presenting for the following:  Physical        6/3/2024     9:02 AM   Additional Questions   Roomed by Jaylin   Accompanied by self         6/3/2024     9:02 AM   Patient Reported Additional Medications   Patient reports taking the following new medications no        Health Care Directive  Patient does not have a Health Care Directive or Living Will: Patient states has Advance Directive and will bring in a copy to clinic.    HPI          5/29/2024   General Health   How would you rate your overall physical health? Good   Feel stress (tense, anxious, or unable to sleep) Not at all         5/29/2024   Nutrition   Three or more servings of calcium each day? Yes   Diet: Regular (no restrictions)   How many servings of fruit and vegetables per day? (!) 2-3   How many sweetened beverages each day? 0-1         5/29/2024   Exercise   Days per week of moderate/strenous exercise 5 days   Average minutes spent exercising at this level 40 min         5/29/2024   Social Factors   Frequency of gathering with friends or relatives Twice a week   Worry food won't last until get money to buy more No   Food not last or not have enough money for food? No   Do you have housing?  Yes   Are you worried about losing your housing? No   Lack of transportation? No   Unable to get utilities (heat,electricity)? No         5/29/2024   Fall Risk   Fallen 2 or more times in the past year? No   Trouble with walking or balance? No          5/29/2024   Dental   Dentist two times every year? Yes         5/29/2024   TB Screening   Were you born outside of the [place]? No         Today's PHQ-2 Score:       6/3/2024     8:53 AM   PHQ-2 ( 1999 [employer])   Q1: Little interest or pleasure in doing things 0   Q2: Feeling down, depressed or  hopeless 0   PHQ-2 Score 0   Q1: Little interest or pleasure in doing things Not at all   Q2: Feeling down, depressed or hopeless Not at all   PHQ-2 Score 0           5/29/2024   Substance Use   Alcohol more than 3/day or more than 7/wk No   Do you use any other substances recreationally? No     Social History     Tobacco Use    Smoking status: Never    Smokeless tobacco: Never   Vaping Use    Vaping status: Never Used   Substance Use Topics    Alcohol use: Yes     Comment: 1-2 drinks per week    Drug use: Never           6/8/2023   LAST FHS-7 RESULTS   1st degree relative breast or ovarian cancer No   Any relative bilateral breast cancer Yes   Any male have breast cancer No   Any ONE woman have BOTH breast AND ovarian cancer No   Any woman with breast cancer before 50yrs Yes   2 or more relatives with breast AND/OR ovarian cancer No   2 or more relatives with breast AND/OR bowel cancer Yes                5/29/2024   STI Screening   New sexual partner(s) since last STI/HIV test? No     History of abnormal Pap smear: No - age 30- 64 PAP with HPV every 5 years recommended        Latest Ref Rng & Units 1/29/2019    10:00 AM   PAP / HPV   PAP Negative for squamous intraepithelial lesion or malignancy. Negative for squamous intraepithelial lesion or malignancy  Electronically signed by Sofya Gu CT (ASCP) on 2/6/2019 at  8:35 AM      HPV 16 DNA NEG Negative    HPV 18 DNA NEG Negative    Other HR HPV NEG Negative      ASCVD Risk   The 10-year ASCVD risk score (Mayte CARLOS, et al., 2019) is: 4.5%    Values used to calculate the score:      Age: 63 years      Sex: Female      Is Non- : No      Diabetic: No      Tobacco smoker: No      Systolic Blood Pressure: 112 mmHg      Is BP treated: Yes      HDL Cholesterol: 68 mg/dL      Total Cholesterol: 187 mg/dL         Reviewed and updated as needed this visit by Provider                      All other systems on a 10-point review are  "negative, unless otherwise noted in HPI       Objective    Exam  /74 (BP Location: Right arm, Patient Position: Sitting, Cuff Size: Adult Large)   Pulse 74   Temp 98  F (36.7  C) (Tympanic)   Resp 16   Ht 1.66 m (5' 5.35\")   Wt 108.8 kg (239 lb 12.8 oz)   SpO2 98%   BMI 39.47 kg/m     Estimated body mass index is 39.47 kg/m  as calculated from the following:    Height as of this encounter: 1.66 m (5' 5.35\").    Weight as of this encounter: 108.8 kg (239 lb 12.8 oz).    Physical Exam  GENERAL: alert and no distress  EYES: Eyes grossly normal to inspection, PERRL and conjunctivae and sclerae normal  HENT: ear canals and TM's normal, nose and mouth without ulcers or lesions  NECK: no adenopathy, no asymmetry, masses, or scars  RESP: lungs clear to auscultation - no rales, rhonchi or wheezes  CV: regular rate and rhythm, normal S1 S2, no S3 or S4, no murmur, click or rub, no peripheral edema  ABDOMEN: soft, nontender, no hepatosplenomegaly, no masses and bowel sounds normal  MS: no gross musculoskeletal defects noted, no edema  SKIN: no suspicious lesions or rashes  NEURO: Normal strength and tone, mentation intact and speech normal  PSYCH: mentation appears normal, affect normal/bright        Signed Electronically by: Liang Medina MD    "

## 2024-06-03 NOTE — PATIENT INSTRUCTIONS
"Preventive Care Advice   This is general advice we often give to help people stay healthy. Your care team may have specific advice just for you. Please talk to your care team about your own preventive care needs.  Lifestyle  Exercise at least 150 minutes each week (30 minutes a day, 5 days a week).  Do muscle strengthening activities 2 days a week. These help control your weight and prevent disease.  No smoking.  Wear sunscreen to prevent skin cancer.  Have your home tested for radon every 2 to 5 years. Radon is a colorless, odorless gas that can harm your lungs. To learn more, go to www.health.[facility].mn. and search for \"Radon in Homes.\"  Keep guns unloaded and locked up in a safe place like a safe or gun vault, or, use a gun lock and hide the keys. Always lock away bullets separately. To learn more, visit [employer].mn.gov and search for \"safe gun storage.\"  Nutrition  Eat 5 or more servings of fruits and vegetables each day.  Try wheat bread, brown rice and whole grain pasta (instead of white bread, rice, and pasta).  Get enough calcium and vitamin D. Check the label on foods and aim for 100% of the RDA (recommended daily allowance).  Regular exams  Have a dental exam and cleaning every 6 months.  See your health care team every year to talk about:  Any changes in your health.  Any medicines your care team has prescribed.  Preventive care, family planning, and ways to prevent chronic diseases.  Shots (vaccines)   HPV shots (up to age 26), if you've never had them before.  Hepatitis B shots (up to age 59), if you've never had them before.  COVID-19 shot: Get this shot when it's due.  Flu shot: Get a flu shot every year.  Tetanus shot: Get a tetanus shot every 10 years.  Pneumococcal, hepatitis A, and RSV shots: Ask your care team if you need these based on your risk.  Shingles shot (for age 50 and up).  General health tests  Diabetes screening:  Starting at age 35, Get screened for diabetes at least every 3 years.  If " you are younger than age 35, ask your care team if you should be screened for diabetes.  Cholesterol test: At age 39, start having a cholesterol test every 5 years, or more often if advised.  Bone density scan (DEXA): At age 50, ask your care team if you should have this scan for osteoporosis (brittle bones).  Hepatitis C: Get tested at least once in your life.  Abdominal aortic aneurysm screening: Talk to your doctor about having this screening if you:  Have ever smoked; and  Are biologically male; and  Are between the ages of 65 and 75.  STIs (sexually transmitted infections)  Before age 24: Ask your care team if you should be screened for STIs.  After age 24: Get screened for STIs if you're at risk. You are at risk for STIs (including HIV) if:  You are sexually active with more than one person.  You don't use condoms every time.  You or a partner was diagnosed with a sexually transmitted infection.  If you are at risk for HIV, ask about PrEP medicine to prevent HIV.  Get tested for HIV at least once in your life, whether you are at risk for HIV or not.  Cancer screening tests  Cervical cancer screening: If you have a cervix, begin getting regular cervical cancer screening tests at age 21. Most people who have regular screenings with normal results can stop after age 65. Talk about this with your provider.  Breast cancer scan (mammogram): If you've ever had breasts, begin having regular mammograms starting at age 40. This is a scan to check for breast cancer.  Colon cancer screening: It is important to start screening for colon cancer at age 45.  Have a colonoscopy test every 10 years (or more often if you're at risk) Or, ask your provider about stool tests like a FIT test every year or Cologuard test every 3 years.  To learn more about your testing options, visit: www.[employer]/807250.pdf.  For help making a decision, visit: [email or web address]/cr19472.  Prostate cancer screening test: If you have a prostate and are age 55  to 69, ask your provider if you would benefit from a yearly prostate cancer screening test.  Lung cancer screening: If you are a current or former smoker age 50 to 80, ask your care team if ongoing lung cancer screenings are right for you.  For informational purposes only. Not to replace the advice of your health care provider. Copyright   2023 [employer]. All rights reserved. Clinically reviewed by the [facility] Transitions Program. [employer] 197647 - REV 04/24.

## 2024-06-04 NOTE — TELEPHONE ENCOUNTER
Currently, the cost is ranging between ~222-438 per month (for 4 syringes) depending on the dose (higher dose is more).  These numbers may change, so these are not exact quotes.  If she is interested, have her schedule a video visit with me to get started.  Thanks.    Liang Medina MD

## 2024-06-06 NOTE — RESULT ENCOUNTER NOTE
Dear Glo,    Your lab results are within normal limits.  At this time, I do not recommend any changes to your current plan of care.    Please feel free to call with any questions.  Otherwise, we can discuss further at your next appointment.    Sincerely,    Liang Medina MD

## 2024-06-07 ENCOUNTER — VIRTUAL VISIT (OUTPATIENT)
Dept: PEDIATRICS | Facility: CLINIC | Age: 64
End: 2024-06-07
Attending: INTERNAL MEDICINE
Payer: COMMERCIAL

## 2024-06-07 ENCOUNTER — TELEPHONE (OUTPATIENT)
Dept: PEDIATRICS | Facility: CLINIC | Age: 64
End: 2024-06-07

## 2024-06-07 PROCEDURE — 99442 PR PHYSICIAN TELEPHONE EVALUATION 11-20 MIN: CPT | Performed by: INTERNAL MEDICINE

## 2024-06-07 NOTE — PATIENT INSTRUCTIONS
How to Inject Compounded Weight Loss Medication    How it is supplied:  You will receive 4 syringes (a 1-month supply of your medication) along with 4 needles.     Storage:   Keep your medication stored in the fridge until you are ready to inject.   The medications are good until the expiration date listed on the prescription sticker.     Syringe Disposal:   Place the used syringe in a sharps container. You can buy a sharps container at your local pharmacy.   If you don't have a sharps container, you can use a plastic detergent container with a lid.   Visit Learning About Safe Needle Use and Disposal (healthwise.net) and safeneedledisposal.org for more information.     Injecting:  Remove your syringes from the refrigerator and allow them to reach room temperature by sitting them out on a counter/ table.   Unlock the cap by pulling the clear outer shell straight off and remove the red syringe cap by twisting counter clockwise. Then attach needle.  Follow the guide below to inject your medication:       [facility]: [phone]

## 2024-06-07 NOTE — PROGRESS NOTES
Glo is a 63 year old who is being evaluated via a billable telephone visit.      Originating Location (pt. Location): Home    Distant Location (provider location):  Off-site    Assessment & Plan     BMI 39.0-39.9,adult  Initiating semaglutide via compounding pharmacy.  Discussed weight goals and rate of weight loss.  Counseled about dosing and side effects.  - Semaglutide-Weight Management (WEGOVY) 0.25 MG/0.5ML pen; Inject 0.25 mg Subcutaneous once a week  - Semaglutide-Weight Management (WEGOVY) 0.5 MG/0.5ML pen; Inject 0.5 mg Subcutaneous once a week            Subjective   Glo is a 63 year old, presenting for the following health issues:  No chief complaint on file.    History of Present Illness       Reason for visit:  Discuss weight management medication    She eats 2-3 servings of fruits and vegetables daily.She consumes 0 sweetened beverage(s) daily.She exercises with enough effort to increase her heart rate 30 to 60 minutes per day.  She exercises with enough effort to increase her heart rate 5 days per week.   She is taking medications regularly.       Was only on saxenda for 1-2 months.  Starting weight - 240 lbs.  Weight goal - 205 (BMI of 33)  Discussed weight loss rate of 1-2 pounds per week.      All other systems on a 10-point review are negative, unless otherwise noted in HPI        Objective           Vitals:  No vitals were obtained today due to virtual visit.    Physical Exam   General: Alert and no distress //Respiratory: No audible wheeze, cough, or shortness of breath // Psychiatric:  Appropriate affect, tone, and pace of words            Phone call duration: 19 minutes  Signed Electronically by: Liang Medina MD

## 2024-06-11 NOTE — TELEPHONE ENCOUNTER
Samara Grimm Ea/Rm40 minutes ago (12:26 PM)       PA DENIED  Please close encounter when finished.  Thank you  RPPA (Retial Pharmacy Prior Authorization) Team

## 2024-06-11 NOTE — TELEPHONE ENCOUNTER
PRIOR AUTHORIZATION DENIED    Medication: SEMAGLUTIDE-WEIGHT MANAGEMENT 0.25 MG/0.5ML SC SOAJ  Insurance Company: [employer] - Phone [phone] Fax [phone]  Denial Date: 6/7/2024  Denial Reason(s):         Appeal Information:     Patient Notified: No

## 2024-06-19 ENCOUNTER — OFFICE VISIT (OUTPATIENT)
Dept: OPTOMETRY | Facility: CLINIC | Age: 64
End: 2024-06-19
Payer: COMMERCIAL

## 2024-06-19 DIAGNOSIS — H02.889 MEIBOMIAN GLAND DYSFUNCTION: ICD-10-CM

## 2024-06-19 DIAGNOSIS — H52.4 PRESBYOPIA: ICD-10-CM

## 2024-06-19 DIAGNOSIS — H52.203 MYOPIA OF BOTH EYES WITH ASTIGMATISM: Primary | ICD-10-CM

## 2024-06-19 DIAGNOSIS — H52.13 MYOPIA OF BOTH EYES WITH ASTIGMATISM: Primary | ICD-10-CM

## 2024-06-19 PROCEDURE — 92014 COMPRE OPH EXAM EST PT 1/>: CPT | Performed by: OPTOMETRIST

## 2024-06-19 PROCEDURE — 92015 DETERMINE REFRACTIVE STATE: CPT | Performed by: OPTOMETRIST

## 2024-06-19 ASSESSMENT — VISUAL ACUITY
OS_CC: 20/20
OD_CC: 20/20
OD_CC: 20/20
OS_CC: 20/20
CORRECTION_TYPE: GLASSES
METHOD: SNELLEN - LINEAR

## 2024-06-19 ASSESSMENT — REFRACTION_MANIFEST
OD_CYLINDER: +0.25
OD_AXIS: 005
OD_SPHERE: -1.25
OS_CYLINDER: +0.75
METHOD_AUTOREFRACTION: 1
OS_ADD: +2.50
OS_AXIS: 010
OD_SPHERE: -1.00
OS_AXIS: 016
OD_CYLINDER: +0.75
OS_SPHERE: -1.50
OS_CYLINDER: +0.75
OS_SPHERE: -2.00
OD_AXIS: 167
OD_ADD: +2.50

## 2024-06-19 ASSESSMENT — SLIT LAMP EXAM - LIDS
COMMENTS: NORMAL
COMMENTS: NORMAL

## 2024-06-19 ASSESSMENT — CONF VISUAL FIELD
OS_INFERIOR_TEMPORAL_RESTRICTION: 0
OD_NORMAL: 1
OS_INFERIOR_NASAL_RESTRICTION: 0
OS_SUPERIOR_NASAL_RESTRICTION: 0
OD_SUPERIOR_TEMPORAL_RESTRICTION: 0
OS_NORMAL: 1
OD_INFERIOR_NASAL_RESTRICTION: 0
OD_SUPERIOR_NASAL_RESTRICTION: 0
OS_SUPERIOR_TEMPORAL_RESTRICTION: 0
METHOD: COUNTING FINGERS
OD_INFERIOR_TEMPORAL_RESTRICTION: 0

## 2024-06-19 ASSESSMENT — REFRACTION_WEARINGRX
OD_AXIS: 178
OS_CYLINDER: +0.75
OD_SPHERE: -1.00
OS_ADD: +2.25
OD_ADD: +2.25
SPECS_TYPE: PAL
OS_SPHERE: -1.00
OD_CYLINDER: +0.25
OS_AXIS: 010

## 2024-06-19 ASSESSMENT — EXTERNAL EXAM - LEFT EYE: OS_EXAM: 1+ BROW PTOSIS

## 2024-06-19 ASSESSMENT — KERATOMETRY
OD_AXISANGLE2_DEGREES: 175
OS_AXISANGLE2_DEGREES: 157
OS_K2POWER_DIOPTERS: 43.75
OD_K1POWER_DIOPTERS: 42.50
OS_AXISANGLE_DEGREES: 67
OS_K1POWER_DIOPTERS: 43.37
OD_K2POWER_DIOPTERS: 43.62
OD_AXISANGLE_DEGREES: 85

## 2024-06-19 ASSESSMENT — TONOMETRY
OD_IOP_MMHG: 17
OS_IOP_MMHG: 17
IOP_METHOD: APPLANATION

## 2024-06-19 ASSESSMENT — CUP TO DISC RATIO
OS_RATIO: 0.3
OD_RATIO: 0.3

## 2024-06-19 ASSESSMENT — EXTERNAL EXAM - RIGHT EYE: OD_EXAM: 1+ BROW PTOSIS

## 2024-06-19 NOTE — PROGRESS NOTES
Chief Complaint   Patient presents with    Annual Eye Exam        Last Eye Exam: 09/2022  Dilated Previously: Yes, side effects of dilation explained today    What are you currently using to see?  glasses       Distance Vision Acuity: Satisfied with vision    Near Vision Acuity: Satisfied with vision while reading and using computer with glasses    Eye Comfort: good  Do you use eye drops? : No      Geovanna Mcclendon - Optometric Assistant     Pohx; bilateral upper lid blepharoplasty Dr Ritchie, very happy  Fohx; mom has dry AMD    Medical, surgical and family histories reviewed and updated 6/19/2024.       OBJECTIVE: See Ophthalmology exam    ASSESSMENT:    ICD-10-CM    1. Myopia of both eyes with astigmatism  H52.13     H52.203       2. Presbyopia  H52.4       3. Meibomian gland dysfunction  H02.889         Minimal change L eye     PLAN:     Hiwot Ward OD

## 2024-06-19 NOTE — LETTER
6/19/2024      Maylin Ni  [address] [facility] [address]      Dear Colleague,    Thank you for referring your patient, Maylin Ni, to the [facility]. Please see a copy of my visit note below.    Chief Complaint   Patient presents with     Annual Eye Exam        Last Eye Exam: 09/2022  Dilated Previously: Yes, side effects of dilation explained today    What are you currently using to see?  glasses       Distance Vision Acuity: Satisfied with vision    Near Vision Acuity: Satisfied with vision while reading and using computer with glasses    Eye Comfort: good  Do you use eye drops? : No      Geovanna Mcclendon - Optometric Assistant     Pohx; bilateral upper lid blepharoplasty Dr Ritchie, very happy  Fohx; mom has dry AMD    Medical, surgical and family histories reviewed and updated 6/19/2024.       OBJECTIVE: See Ophthalmology exam    ASSESSMENT:    ICD-10-CM    1. Myopia of both eyes with astigmatism  H52.13     H52.203       2. Presbyopia  H52.4       3. Meibomian gland dysfunction  H02.889         Minimal change L eye     PLAN:     Hiwot Ward OD     Again, thank you for allowing me to participate in the care of your patient.        Sincerely,        Hiwot Ward, OD

## 2024-07-26 ENCOUNTER — VIRTUAL VISIT (OUTPATIENT)
Dept: PEDIATRICS | Facility: CLINIC | Age: 64
End: 2024-07-26
Payer: COMMERCIAL

## 2024-07-26 DIAGNOSIS — I10 ESSENTIAL HYPERTENSION: Primary | ICD-10-CM

## 2024-07-26 DIAGNOSIS — E66.01 CLASS 2 SEVERE OBESITY DUE TO EXCESS CALORIES WITH SERIOUS COMORBIDITY AND BODY MASS INDEX (BMI) OF 36.0 TO 36.9 IN ADULT (H): ICD-10-CM

## 2024-07-26 DIAGNOSIS — K56.609 SMALL BOWEL OBSTRUCTION (H): ICD-10-CM

## 2024-07-26 DIAGNOSIS — E66.812 CLASS 2 SEVERE OBESITY DUE TO EXCESS CALORIES WITH SERIOUS COMORBIDITY AND BODY MASS INDEX (BMI) OF 36.0 TO 36.9 IN ADULT (H): ICD-10-CM

## 2024-07-26 PROCEDURE — G2211 COMPLEX E/M VISIT ADD ON: HCPCS | Mod: 95 | Performed by: INTERNAL MEDICINE

## 2024-07-26 PROCEDURE — 99214 OFFICE O/P EST MOD 30 MIN: CPT | Mod: 95 | Performed by: INTERNAL MEDICINE

## 2024-07-26 NOTE — PROGRESS NOTES
Glo is a 64 year old who is being evaluated via a billable video visit.          Assessment & Plan     BMI of 36.0 to 36.9  Pt on 2nd week of 0.5 mg dose and has been losing about 2 lbs per week.  Since this is an appropriate weight of weight loss and with her hx of bowel obstruction, plan to stay on the 0.5 mg dose x 3 months and f/up in person for vitals and BP check.      She will contact me sooner via MyC to request an increase to the 1mg dose IF the compounding pharmacy has supply issues with the 0.5 mg dose or if she is starting to plateau.     - Semaglutide-Weight Management (WEGOVY) 0.5 MG/0.5ML pen; Inject 0.5 mg subcutaneously once a week    The longitudinal plan of care for the diagnosis(es)/condition(s) as documented were addressed during this visit. Due to the added complexity in care, I will continue to support Glo in the subsequent management and with ongoing continuity of care.    Essential hypertension  Monitor - will check BP in 3 months to see if we can start lowering her doses.    3. Small bowel obstruction (H)  Monitor closely for SE - she is taking miralax and drinking plenty of fluids to keep her bowels regular.      The longitudinal plan of care for the diagnosis(es)/condition(s) as documented were addressed during this visit. Due to the added complexity in care, I will continue to support Glo in the subsequent management and with ongoing continuity of care.          There are no Patient Instructions on file for this visit.    Subjective   Glo is a 64 year old, presenting for the following health issues:  No chief complaint on file.    History of Present Illness       Reason for visit:  Follow up    She eats 2-3 servings of fruits and vegetables daily.She consumes 0 sweetened beverage(s) daily.She exercises with enough effort to increase her heart rate 30 to 60 minutes per day.  She exercises with enough effort to increase her heart rate 5 days per week.   She is taking medications  regularly.     Down 10 lbs.  2 lbs per week.  Feels better already.          All other systems on a 10-point review are negative, unless otherwise noted in HPI        Objective           Vitals:  No vitals were obtained today due to virtual visit.    Physical Exam   GENERAL: alert and no distress  EYES: Eyes grossly normal to inspection.  No discharge or erythema, or obvious scleral/conjunctival abnormalities.  RESP: No audible wheeze, cough, or visible cyanosis.    SKIN: Visible skin clear. No significant rash, abnormal pigmentation or lesions.  NEURO: Cranial nerves grossly intact.  Mentation and speech appropriate for age.  PSYCH: Appropriate affect, tone, and pace of words          Video-Visit Details    Type of service:  Video Visit   Originating Location (pt. Location): Home    Distant Location (provider location):  Off-site  Platform used for Video Visit: Coy  Signed Electronically by: Liang Medina MD

## 2024-08-15 ENCOUNTER — MYC MEDICAL ADVICE (OUTPATIENT)
Dept: PEDIATRICS | Facility: CLINIC | Age: 64
End: 2024-08-15
Payer: COMMERCIAL

## 2024-08-15 DIAGNOSIS — E66.812 CLASS 2 SEVERE OBESITY DUE TO EXCESS CALORIES WITH SERIOUS COMORBIDITY AND BODY MASS INDEX (BMI) OF 36.0 TO 36.9 IN ADULT (H): Primary | ICD-10-CM

## 2024-08-15 DIAGNOSIS — E66.01 CLASS 2 SEVERE OBESITY DUE TO EXCESS CALORIES WITH SERIOUS COMORBIDITY AND BODY MASS INDEX (BMI) OF 36.0 TO 36.9 IN ADULT (H): Primary | ICD-10-CM

## 2024-09-25 ENCOUNTER — MYC MEDICAL ADVICE (OUTPATIENT)
Dept: PEDIATRICS | Facility: CLINIC | Age: 64
End: 2024-09-25
Payer: COMMERCIAL

## 2024-09-25 NOTE — TELEPHONE ENCOUNTER
"Per chart review   VV 7/26/2024 with Dr. Medina  \"BMI of 36.0 to 36.9  Pt on 2nd week of 0.5 mg dose and has been losing about 2 lbs per week.  Since this is an appropriate weight of weight loss and with her hx of bowel obstruction, plan to stay on the 0.5 mg dose x 3 months and f/up in person for vitals and BP check.  She will contact me sooner via MyC to request an increase to the 1mg dose [facility] the 0.5 mg dose or if she is starting to plateau.\"    8/15/2024MyChart with Dr. Medina  \"Below is my weight per my Wednesday weight check:  227.9 on 7/31  229.2 on 8/7  227.4 on 8/14    I recommend to increase to the 1 mg dose - I sent it in to the pharmacy.  It's okay to start this once you complete your 0.5 mg doses.  Best,  Liang Medina MD\"    10/24/2024 1:00 PM (Arrive by 12:40 PM) Liang Medina Mai, MD [facility]     Unsure which next dose is preferred so did not pend mediation. Pharmacy pended.    Dr. Medina please review and advise. Would you like patient to submit an e-visit instead?    Jacinda Leyva RN    "

## 2024-09-25 NOTE — TELEPHONE ENCOUNTER
Called and spoke with pt,     Relayed message below from Dr Medina.     Pt verbalizes understanding and is agreeable with plan. Pt denies any further questions or concerns at this time.     Adriane SEAMAN, RN   Clinic RN  [facility]

## 2024-09-25 NOTE — TELEPHONE ENCOUNTER
Given hx of bowel obstruction, recommend to remain of 1 mg dose until our appt, unless she continues to plateau or even gain weight.  If she wishes to increase the dose before our appt, e-visit is appropriate.    Liang Medina MD

## 2024-10-24 ENCOUNTER — OFFICE VISIT (OUTPATIENT)
Dept: PEDIATRICS | Facility: CLINIC | Age: 64
End: 2024-10-24
Payer: COMMERCIAL

## 2024-10-24 VITALS
WEIGHT: 217.25 LBS | BODY MASS INDEX: 36.19 KG/M2 | DIASTOLIC BLOOD PRESSURE: 74 MMHG | HEART RATE: 77 BPM | HEIGHT: 65 IN | SYSTOLIC BLOOD PRESSURE: 108 MMHG | TEMPERATURE: 98 F

## 2024-10-24 DIAGNOSIS — I10 ESSENTIAL HYPERTENSION: Primary | ICD-10-CM

## 2024-10-24 DIAGNOSIS — E66.01 CLASS 2 SEVERE OBESITY DUE TO EXCESS CALORIES WITH SERIOUS COMORBIDITY AND BODY MASS INDEX (BMI) OF 36.0 TO 36.9 IN ADULT (H): ICD-10-CM

## 2024-10-24 DIAGNOSIS — Z78.0 ASYMPTOMATIC MENOPAUSE: ICD-10-CM

## 2024-10-24 DIAGNOSIS — E66.812 CLASS 2 SEVERE OBESITY DUE TO EXCESS CALORIES WITH SERIOUS COMORBIDITY AND BODY MASS INDEX (BMI) OF 36.0 TO 36.9 IN ADULT (H): ICD-10-CM

## 2024-10-24 PROCEDURE — G2211 COMPLEX E/M VISIT ADD ON: HCPCS | Performed by: INTERNAL MEDICINE

## 2024-10-24 PROCEDURE — 99214 OFFICE O/P EST MOD 30 MIN: CPT | Performed by: INTERNAL MEDICINE

## 2024-10-24 ASSESSMENT — PAIN SCALES - GENERAL: PAINLEVEL_OUTOF10: NO PAIN (0)

## 2024-10-24 NOTE — PROGRESS NOTES
"  Assessment & Plan     Essential hypertension  See PI - readings running on lower end of normal.  Asymptomatic.  Likely due to weight loss.    Class 2 severe obesity due to excess calories with serious comorbidity and body mass index (BMI) of 36.0 to 36.9 in adult (H)  See PI    Asymptomatic menopause  - DEXA HIP/PELVIS/SPINE - Future; Future    The longitudinal plan of care for the diagnosis(es)/condition(s) as documented were addressed during this visit. Due to the added complexity in care, I will continue to support Glo in the subsequent management and with ongoing continuity of care.        Patient Instructions   Blood pressure:  - monitor for a few days to weeks and if BPs are consistently low (<120/<80), trial a lower dose of lisinopril/hydrochlorothiazide (1 tab vs 2).  Monitor and let me know if this is working.    Semaglutide:  Continue current dose and f/up at physical (let me know when you need a refill).    Subjective   Glo is a 64 year old, presenting for the following health issues:  Recheck Medication (Semaglutide)        10/24/2024    12:35 PM   Additional Questions   Roomed by Sandy Tapia CMA   Accompanied by CHELE         10/24/2024    12:35 PM   Patient Reported Additional Medications   Patient reports taking the following new medications N/A     History of Present Illness       Reason for visit:  Blood pressureand med check   She is taking medications regularly.           Review of Systems  Constitutional, HEENT, cardiovascular, pulmonary, gi and gu systems are negative, except as otherwise noted.      Objective    /74 (BP Location: Right arm, Patient Position: Right side, Cuff Size: Adult Large)   Pulse 77   Temp 98  F (36.7  C) (Oral)   Ht 1.659 m (5' 5.3\")   Wt 98.5 kg (217 lb 4 oz)   HC 20 cm (7.87\")   BMI 35.82 kg/m    Body mass index is 35.82 kg/m .  Physical Exam   GENERAL: healthy, alert and no distress  EYES: Eyes grossly normal to inspection  NECK: no asymmetry, masses, " or scars  MS: no gross musculoskeletal defects noted, no edema  SKIN: no suspicious lesions or rashes  NEURO: mentation intact and speech normal  PSYCH: mentation appears normal and affect normal/bright              Signed Electronically by: Liang Medina MD

## 2024-10-24 NOTE — PATIENT INSTRUCTIONS
Blood pressure:  - monitor for a few days to weeks and if BPs are consistently low (<120/<80), trial a lower dose of lisinopril/hydrochlorothiazide (1 tab vs 2).  Monitor and let me know if this is working.    Semaglutide:  Continue current dose and f/up at physical (let me know when you need a refill).

## 2024-10-25 ENCOUNTER — PATIENT OUTREACH (OUTPATIENT)
Dept: CARE COORDINATION | Facility: CLINIC | Age: 64
End: 2024-10-25
Payer: COMMERCIAL

## 2024-11-25 ENCOUNTER — ANCILLARY PROCEDURE (OUTPATIENT)
Dept: BONE DENSITY | Facility: CLINIC | Age: 64
End: 2024-11-25
Attending: INTERNAL MEDICINE
Payer: COMMERCIAL

## 2024-11-25 DIAGNOSIS — Z78.0 ASYMPTOMATIC MENOPAUSE: ICD-10-CM

## 2024-11-25 PROCEDURE — 77080 DXA BONE DENSITY AXIAL: CPT | Mod: TC | Performed by: PHYSICIAN ASSISTANT

## 2024-11-26 NOTE — RESULT ENCOUNTER NOTE
Zainab Cody ,    The results from your recent lab work are within normal limits.    The bone density scan appears to be within normal limits.        Thank you for choosing [facility] for your health care needs,      Macey Garcia PA-C

## 2024-12-18 NOTE — TELEPHONE ENCOUNTER
I spoke with patient this morning and she is having symptoms are suggestive of a sinus infection.  She has had cold type symptoms for about the last for 5 days and now is having significant pain in the right side of her face including her maxillary as well as frontal sinus area.  She denies that she is having any kind of fever.  We will send in a prescription for doxycycline to the pharmacy.  This is to be twice a day for 10 days.  She does not have improvement in her symptoms she certainly will let me know and will need to be seen.   Vital Signs Last 24 Hrs  T(C): 37 (18 Dec 2024 07:44), Max: 37.1 (17 Dec 2024 16:25)  T(F): 98.6 (18 Dec 2024 07:44), Max: 98.7 (17 Dec 2024 16:25)  HR: 71 (18 Dec 2024 07:44) (71 - 98)  BP: 160/99 (18 Dec 2024 13:09) (139/88 - 160/99)  BP(mean): --  RR: 18 (18 Dec 2024 07:44) (18 - 18)  SpO2: 97% (18 Dec 2024 07:44) (97% - 99%)    Parameters below as of 18 Dec 2024 07:44  Patient On (Oxygen Delivery Method): room air

## 2024-12-26 ENCOUNTER — MYC MEDICAL ADVICE (OUTPATIENT)
Dept: PEDIATRICS | Facility: CLINIC | Age: 64
End: 2024-12-26
Payer: COMMERCIAL

## 2024-12-26 DIAGNOSIS — E66.01 CLASS 2 SEVERE OBESITY DUE TO EXCESS CALORIES WITH SERIOUS COMORBIDITY AND BODY MASS INDEX (BMI) OF 36.0 TO 36.9 IN ADULT (H): ICD-10-CM

## 2024-12-26 DIAGNOSIS — E66.812 CLASS 2 SEVERE OBESITY DUE TO EXCESS CALORIES WITH SERIOUS COMORBIDITY AND BODY MASS INDEX (BMI) OF 36.0 TO 36.9 IN ADULT (H): ICD-10-CM

## 2025-05-31 SDOH — HEALTH STABILITY: PHYSICAL HEALTH: ON AVERAGE, HOW MANY MINUTES DO YOU ENGAGE IN EXERCISE AT THIS LEVEL?: 30 MIN

## 2025-05-31 SDOH — HEALTH STABILITY: PHYSICAL HEALTH: ON AVERAGE, HOW MANY DAYS PER WEEK DO YOU ENGAGE IN MODERATE TO STRENUOUS EXERCISE (LIKE A BRISK WALK)?: 6 DAYS

## 2025-05-31 ASSESSMENT — ASTHMA QUESTIONNAIRES
QUESTION_5 LAST FOUR WEEKS HOW WOULD YOU RATE YOUR ASTHMA CONTROL: COMPLETELY CONTROLLED
QUESTION_3 LAST FOUR WEEKS HOW OFTEN DID YOUR ASTHMA SYMPTOMS (WHEEZING, COUGHING, SHORTNESS OF BREATH, CHEST TIGHTNESS OR PAIN) WAKE YOU UP AT NIGHT OR EARLIER THAN USUAL IN THE MORNING: NOT AT ALL
QUESTION_1 LAST FOUR WEEKS HOW MUCH OF THE TIME DID YOUR ASTHMA KEEP YOU FROM GETTING AS MUCH DONE AT WORK, SCHOOL OR AT HOME: NONE OF THE TIME
QUESTION_4 LAST FOUR WEEKS HOW OFTEN HAVE YOU USED YOUR RESCUE INHALER OR NEBULIZER MEDICATION (SUCH AS ALBUTEROL): NOT AT ALL
QUESTION_2 LAST FOUR WEEKS HOW OFTEN HAVE YOU HAD SHORTNESS OF BREATH: NOT AT ALL
ACT_TOTALSCORE: 25

## 2025-05-31 ASSESSMENT — SOCIAL DETERMINANTS OF HEALTH (SDOH): HOW OFTEN DO YOU GET TOGETHER WITH FRIENDS OR RELATIVES?: TWICE A WEEK

## 2025-06-05 ENCOUNTER — OFFICE VISIT (OUTPATIENT)
Dept: PEDIATRICS | Facility: CLINIC | Age: 65
End: 2025-06-05
Payer: COMMERCIAL

## 2025-06-05 VITALS
TEMPERATURE: 97.4 F | HEART RATE: 80 BPM | WEIGHT: 204.5 LBS | OXYGEN SATURATION: 99 % | HEIGHT: 65 IN | BODY MASS INDEX: 34.07 KG/M2 | DIASTOLIC BLOOD PRESSURE: 74 MMHG | SYSTOLIC BLOOD PRESSURE: 116 MMHG | RESPIRATION RATE: 13 BRPM

## 2025-06-05 DIAGNOSIS — Z23 NEED FOR VACCINATION: ICD-10-CM

## 2025-06-05 DIAGNOSIS — Z13.220 ENCOUNTER FOR LIPID SCREENING FOR CARDIOVASCULAR DISEASE: ICD-10-CM

## 2025-06-05 DIAGNOSIS — I10 ESSENTIAL HYPERTENSION: ICD-10-CM

## 2025-06-05 DIAGNOSIS — Z13.6 ENCOUNTER FOR LIPID SCREENING FOR CARDIOVASCULAR DISEASE: ICD-10-CM

## 2025-06-05 DIAGNOSIS — Z00.00 ENCOUNTER FOR MEDICARE ANNUAL WELLNESS EXAM: Primary | ICD-10-CM

## 2025-06-05 LAB
ANION GAP SERPL CALCULATED.3IONS-SCNC: 14 MMOL/L (ref 7–15)
BUN SERPL-MCNC: 10.9 MG/DL (ref 8–23)
CALCIUM SERPL-MCNC: 9.9 MG/DL (ref 8.8–10.4)
CHLORIDE SERPL-SCNC: 100 MMOL/L (ref 98–107)
CHOLEST SERPL-MCNC: 182 MG/DL
CREAT SERPL-MCNC: 0.67 MG/DL (ref 0.51–0.95)
EGFRCR SERPLBLD CKD-EPI 2021: >90 ML/MIN/1.73M2
FASTING STATUS PATIENT QL REPORTED: YES
FASTING STATUS PATIENT QL REPORTED: YES
GLUCOSE SERPL-MCNC: 87 MG/DL (ref 70–99)
HCO3 SERPL-SCNC: 24 MMOL/L (ref 22–29)
HDLC SERPL-MCNC: 57 MG/DL
LDLC SERPL CALC-MCNC: 106 MG/DL
NONHDLC SERPL-MCNC: 125 MG/DL
POTASSIUM SERPL-SCNC: 3.6 MMOL/L (ref 3.4–5.3)
SODIUM SERPL-SCNC: 138 MMOL/L (ref 135–145)
TRIGL SERPL-MCNC: 93 MG/DL

## 2025-06-05 RX ORDER — LISINOPRIL AND HYDROCHLOROTHIAZIDE 12.5; 2 MG/1; MG/1
2 TABLET ORAL DAILY
Qty: 180 TABLET | Refills: 3 | Status: SHIPPED | OUTPATIENT
Start: 2025-06-05

## 2025-06-05 NOTE — PROGRESS NOTES
"Preventive Care Visit  [facility] Medina MD, Internal Medicine - Pediatrics  Jun 5, 2025      Assessment & Plan     Encounter for Medicare annual wellness exam    Essential hypertension  Pt has been steadily losing weight on semaglutide (now getting through [facility]) and BPs are running lower.  Advised a trial of half the dose (1 tab instead of 2) if BPs continue to remain low at home and/or if pt develops light headedness or other orthostatic symptoms.  Pt to update me with home BPs if she makes a change before her next annual visit.  - BASIC METABOLIC PANEL; Future  - lisinopril-hydrochlorothiazide (ZESTORETIC) 20-12.5 MG tablet; Take 2 tablets by mouth daily.    Encounter for lipid screening for cardiovascular disease  - Lipid panel reflex to direct LDL Fasting; Future        BMI  Estimated body mass index is 33.72 kg/m  as calculated from the following:    Height as of this encounter: 1.659 m (5' 5.3\").    Weight as of this encounter: 92.8 kg (204 lb 8 oz).   Weight management plan: Discussed healthy diet and exercise guidelines    Counseling  Appropriate preventive services were addressed with this patient via screening, questionnaire, or discussion as appropriate for fall prevention, nutrition, physical activity, Tobacco-use cessation, social engagement, weight loss and cognition.  Checklist reviewing preventive services available has been given to the patient.  Reviewed patient's diet, addressing concerns and/or questions.       Follow-up   The longitudinal plan of care for the diagnosis(es)/condition(s) as documented were addressed during this visit. Due to the added complexity in care, I will continue to support Glo in the subsequent management and with ongoing continuity of care.    Yadira Cody is a 64 year old, presenting for the following:  Physical        6/5/2025     7:04 AM   Additional Questions   Roomed by VÍCTOR NUNEZ          HPI  Is on 1.25 mg semaglutide through " [facility].  Has been losing weight still.  No side effects.  Down 38 lbs (since June 19th of 2024).    The 10-year ASCVD risk score (Mayte CARLOS, et al., 2019) is: 5.8%    Values used to calculate the score:      Age: 64 years      Sex: Female      Is Non- : No      Diabetic: No      Tobacco smoker: No      Systolic Blood Pressure: 116 mmHg      Is BP treated: Yes      HDL Cholesterol: 56 mg/dL      Total Cholesterol: 180 mg/dL           Advance Care Planning    Discussed advance care planning with patient; informed AVS has link to [facility].        5/31/2025   General Health   How would you rate your overall physical health? Good   Feel stress (tense, anxious, or unable to sleep) Only a little   (!) STRESS CONCERN      5/31/2025   Nutrition   Diet: Regular (no restrictions)         5/31/2025   Exercise   Days per week of moderate/strenous exercise 6 days   Average minutes spent exercising at this level 30 min         5/31/2025   Social Factors   Frequency of gathering with friends or relatives Twice a week   Worry food won't last until get money to buy more No   Food not last or not have enough money for food? No   Do you have housing? (Housing is defined as stable permanent housing and does not include staying outside in a car, in a tent, in an abandoned building, in an overnight shelter, or couch-surfing.) Yes   Are you worried about losing your housing? No   Lack of transportation? No   Unable to get utilities (heat,electricity)? No         5/31/2025   Fall Risk   Fallen 2 or more times in the past year? No   Trouble with walking or balance? No          5/31/2025   Activities of Daily Living- Home Safety   Needs help with the following daily activites None of the above   Safety concerns in the home None of the above         5/31/2025   Dental   Dentist two times every year? Yes         5/31/2025   Hearing Screening   Hearing concerns? None of the above         5/31/2025   Driving Risk  Screening   Patient/family members have concerns about driving No         5/31/2025   General Alertness/Fatigue Screening   Have you been more tired than usual lately? No         5/31/2025   Urinary Incontinence Screening   Bothered by leaking urine in past 6 months No         Today's PHQ-2 Score:       6/4/2025     9:40 AM   PHQ-2 ( 1999 Pfizer)   Q1: Little interest or pleasure in doing things 0   Q2: Feeling down, depressed or hopeless 0   PHQ-2 Score 0    Q1: Little interest or pleasure in doing things Not at all   Q2: Feeling down, depressed or hopeless Not at all   PHQ-2 Score 0       Patient-reported           5/31/2025   Substance Use   Alcohol more than 3/day or more than 7/wk No   Do you have a current opioid prescription? No   How severe/bad is pain from 1 to 10? 0/10 (No Pain)   Do you use any other substances recreationally? No     Social History     Tobacco Use    Smoking status: Never     Passive exposure: Never    Smokeless tobacco: Never   Vaping Use    Vaping status: Never Used   Substance Use Topics    Alcohol use: Yes     Comment: 1-2 drinks per week    Drug use: Never           6/3/2024   LAST FHS-7 RESULTS   1st degree relative breast or ovarian cancer No   Any relative bilateral breast cancer Yes   Any male have breast cancer No   Any ONE woman have BOTH breast AND ovarian cancer No   Any woman with breast cancer before 50yrs Yes   2 or more relatives with breast AND/OR ovarian cancer No   2 or more relatives with breast AND/OR bowel cancer Yes        Mammogram Screening - Mammogram every 1-2 years updated in Health Maintenance based on mutual decision making      History of abnormal Pap smear: No - age 65 or older with adequate negative prior screening test results (3 consecutive negative cytology results, 2 consecutive negative cotesting results, or 2 consecutive negative HrHPV test results within 10 years, with the most recent test occurring within the recommended screening interval for the  test used)        Latest Ref Rng & Units 1/29/2019    10:00 AM   PAP / HPV   PAP Negative for squamous intraepithelial lesion or malignancy. Negative for squamous intraepithelial lesion or malignancy  Electronically signed by Sofya Gu CT (ASCP) on 2/6/2019 at  8:35 AM      HPV 16 DNA NEG Negative    HPV 18 DNA NEG Negative    Other HR HPV NEG Negative      ASCVD Risk   The 10-year ASCVD risk score (Mayte CARLOS, et al., 2019) is: 5.8%    Values used to calculate the score:      Age: 64 years      Sex: Female      Is Non- : No      Diabetic: No      Tobacco smoker: No      Systolic Blood Pressure: 116 mmHg      Is BP treated: Yes      HDL Cholesterol: 56 mg/dL      Total Cholesterol: 180 mg/dL            Reviewed and updated as needed this visit by Provider                      Current providers sharing in care for this patient include:  Patient Care Team:  Jessica Medina Mai, MD as PCP - General (Internal Medicine - Pediatrics)  Home Ritchie MD as MD (Ophthalmology)  Hiwot Ward OD as MD (Ophthalmology)  Jessica Medina Mai, MD as Assigned PCP  Hiwot Ward OD as Assigned Surgical Provider    The following health maintenance items are reviewed in Epic and correct as of today:  Health Maintenance   Topic Date Due    PNEUMOCOCCAL VACCINE 50+ YEARS (2 of 2 - PCV) 07/04/2013    RSV VACCINE (1 - Risk 60-74 years 1-dose series) Never done    ASTHMA ACTION PLAN  10/11/2023    COVID-19 VACCINE (5 - 2024-25 season) 09/01/2024    BMP  06/03/2025    ANNUAL REVIEW OF HM ORDERS  06/03/2025    MEDICARE ANNUAL WELLNESS VISIT  06/03/2025    COLORECTAL CANCER SCREENING  07/31/2025    MAMMO SCREENING  06/03/2026    ASTHMA CONTROL TEST  06/05/2026    FALL RISK ASSESSMENT  06/05/2026    DIABETES SCREENING  06/03/2027    LIPID  06/03/2029    ADVANCE CARE PLANNING  06/03/2029    DTAP/TDAP/TD VACCINE (7 - Td or Tdap) 05/23/2033    DEXA  11/25/2039    HEPATITIS C  "SCREENING  Completed    HIV SCREENING  Completed    PHQ-2 (once per calendar year)  Completed    INFLUENZA VACCINE  Completed    ZOSTER VACCINE  Completed    HPV VACCINE  Aged Out    MENINGITIS VACCINE  Aged Out    PAP  Discontinued       All other systems on a 10-point review are negative, unless otherwise noted in HPI       Objective    Exam  /74 (BP Location: Right arm, Patient Position: Sitting, Cuff Size: Adult Large)   Pulse 80   Temp 97.4  F (36.3  C) (Temporal)   Resp 13   Ht 1.659 m (5' 5.3\")   Wt 92.8 kg (204 lb 8 oz)   SpO2 99%   BMI 33.72 kg/m     Estimated body mass index is 33.72 kg/m  as calculated from the following:    Height as of this encounter: 1.659 m (5' 5.3\").    Weight as of this encounter: 92.8 kg (204 lb 8 oz).    Physical Exam  GENERAL: alert and no distress  EYES: Eyes grossly normal to inspection, PERRL and conjunctivae and sclerae normal  HENT: ear canals and TM's normal, nose and mouth without ulcers or lesions  NECK: no adenopathy, no asymmetry, masses, or scars  RESP: lungs clear to auscultation - no rales, rhonchi or wheezes  CV: regular rate and rhythm, normal S1 S2, no S3 or S4, no murmur, click or rub, no peripheral edema  ABDOMEN: soft, nontender, no hepatosplenomegaly, no masses and bowel sounds normal  MS: no gross musculoskeletal defects noted, no edema  SKIN: no suspicious lesions or rashes  NEURO: Normal strength and tone, mentation intact and speech normal  PSYCH: mentation appears normal, affect normal/bright        6/5/2025   Mini Cog   Clock Draw Score 2 Normal   3 Item Recall 3 objects recalled   Mini Cog Total Score 5         Vision Screen  Reason Vision Screen Not Completed: Screening Recommend: Patient/Guardian Declined (pt sees an eye doc)      Signed Electronically by: Liang Medina MD    "

## 2025-06-05 NOTE — PATIENT INSTRUCTIONS
You can get your RSV, COVID, and prevnar 20 at the pharmacy before the fall.    Patient Education   Preventive Care Advice   This is general advice given by our system to help you stay healthy. However, your care team may have specific advice just for you. Please talk to your care team about your preventive care needs.  Nutrition  Eat 5 or more servings of fruits and vegetables each day.  Try wheat bread, brown rice and whole grain pasta (instead of white bread, rice, and pasta).  Get enough calcium and vitamin D. Check the label on foods and aim for 100% of the RDA (recommended daily allowance).  Lifestyle  Exercise at least 150 minutes each week  (30 minutes a day, 5 days a week).  Do muscle strengthening activities 2 days a week. These help control your weight and prevent disease.  No smoking.  Wear sunscreen to prevent skin cancer.  Have a dental exam and cleaning every 6 months.  Yearly exams  See your health care team every year to talk about:  Any changes in your health.  Any medicines your care team has prescribed.  Preventive care, family planning, and ways to prevent chronic diseases.  Shots (vaccines)   HPV shots (up to age 26), if you've never had them before.  Hepatitis B shots (up to age 59), if you've never had them before.  COVID-19 shot: Get this shot when it's due.  Flu shot: Get a flu shot every year.  Tetanus shot: Get a tetanus shot every 10 years.  Pneumococcal, hepatitis A, and RSV shots: Ask your care team if you need these based on your risk.  Shingles shot (for age 50 and up)  General health tests  Diabetes screening:  Starting at age 35, Get screened for diabetes at least every 3 years.  If you are younger than age 35, ask your care team if you should be screened for diabetes.  Cholesterol test: At age 39, start having a cholesterol test every 5 years, or more often if advised.  Bone density scan (DEXA): At age 50, ask your care team if you should have this scan for osteoporosis (brittle  bones).  Hepatitis C: Get tested at least once in your life.  STIs (sexually transmitted infections)  Before age 24: Ask your care team if you should be screened for STIs.  After age 24: Get screened for STIs if you're at risk. You are at risk for STIs (including HIV) if:  You are sexually active with more than one person.  You don't use condoms every time.  You or a partner was diagnosed with a sexually transmitted infection.  If you are at risk for HIV, ask about PrEP medicine to prevent HIV.  Get tested for HIV at least once in your life, whether you are at risk for HIV or not.  Cancer screening tests  Cervical cancer screening: If you have a cervix, begin getting regular cervical cancer screening tests starting at age 21.  Breast cancer scan (mammogram): If you've ever had breasts, begin having regular mammograms starting at age 40. This is a scan to check for breast cancer.  Colon cancer screening: It is important to start screening for colon cancer at age 45.  Have a colonoscopy test every 10 years (or more often if you're at risk) Or, ask your provider about stool tests like a FIT test every year or Cologuard test every 3 years.  To learn more about your testing options, visit:   .  For help making a decision, visit:   https://bit.ly/xq33693.  Prostate cancer screening test: If you have a prostate, ask your care team if a prostate cancer screening test (PSA) at age 55 is right for you.  Lung cancer screening: If you are a current or former smoker ages 50 to 80, ask your care team if ongoing lung cancer screenings are right for you.  For informational purposes only. Not to replace the advice of your health care provider. Copyright   2023 [employer]. All rights reserved. Clinically reviewed by the [facility]. psicofxp [id number] - REV 01/24.

## 2025-06-09 ENCOUNTER — RESULTS FOLLOW-UP (OUTPATIENT)
Dept: PEDIATRICS | Facility: CLINIC | Age: 65
End: 2025-06-09

## 2025-07-03 ENCOUNTER — OFFICE VISIT (OUTPATIENT)
Dept: OPTOMETRY | Facility: CLINIC | Age: 65
End: 2025-07-03
Payer: COMMERCIAL

## 2025-07-03 DIAGNOSIS — H52.203 MYOPIA OF BOTH EYES WITH ASTIGMATISM: Primary | ICD-10-CM

## 2025-07-03 DIAGNOSIS — H52.13 MYOPIA OF BOTH EYES WITH ASTIGMATISM: Primary | ICD-10-CM

## 2025-07-03 DIAGNOSIS — H52.4 PRESBYOPIA: ICD-10-CM

## 2025-07-03 ASSESSMENT — REFRACTION_MANIFEST
OS_SPHERE: -+1.50
METHOD_AUTOREFRACTION: 1
OD_AXIS: 155
OS_CYLINDER: +0.75
OD_SPHERE: -1.00
OD_SPHERE: -1.25
OS_AXIS: 025
OS_CYLINDER: +0.75
OD_AXIS: 173
OS_SPHERE: -1.75
OS_ADD: +2.50
OD_CYLINDER: +0.25
OS_AXIS: 004
OD_CYLINDER: +0.50
OD_ADD: +2.50

## 2025-07-03 ASSESSMENT — VISUAL ACUITY
OD_CC: 20/20
OS_CC+: -1
OS_CC: 20/20
CORRECTION_TYPE: GLASSES
OD_CC+: -1
METHOD: SNELLEN - LINEAR
OD_CC: 20/20
OS_CC: 20/20

## 2025-07-03 ASSESSMENT — REFRACTION_WEARINGRX
OD_AXIS: 178
OS_AXIS: 010
OS_CYLINDER: +0.75
OD_SPHERE: -1.00
OD_ADD: +2.25
OS_ADD: +2.25
OS_SPHERE: -1.00
OD_CYLINDER: +0.25
SPECS_TYPE: PAL

## 2025-07-03 ASSESSMENT — KERATOMETRY
OS_AXISANGLE_DEGREES: 72
OD_AXISANGLE_DEGREES: 91
OD_K2POWER_DIOPTERS: 43.87
OD_K1POWER_DIOPTERS: 42.75
OS_AXISANGLE2_DEGREES: 162
OS_K2POWER_DIOPTERS: 44
OD_AXISANGLE2_DEGREES: 1
OS_K1POWER_DIOPTERS: 43.37

## 2025-07-03 ASSESSMENT — CONF VISUAL FIELD
OD_INFERIOR_NASAL_RESTRICTION: 0
OD_SUPERIOR_TEMPORAL_RESTRICTION: 0
OS_NORMAL: 1
OD_INFERIOR_TEMPORAL_RESTRICTION: 0
OD_SUPERIOR_NASAL_RESTRICTION: 0
OS_SUPERIOR_NASAL_RESTRICTION: 0
OD_NORMAL: 1
OS_INFERIOR_NASAL_RESTRICTION: 0
OS_INFERIOR_TEMPORAL_RESTRICTION: 0
OS_SUPERIOR_TEMPORAL_RESTRICTION: 0
METHOD: COUNTING FINGERS

## 2025-07-03 ASSESSMENT — TONOMETRY
OS_IOP_MMHG: 15
OD_IOP_MMHG: 16
IOP_METHOD: APPLANATION

## 2025-07-03 ASSESSMENT — SLIT LAMP EXAM - LIDS
COMMENTS: NORMAL
COMMENTS: NORMAL

## 2025-07-03 ASSESSMENT — EXTERNAL EXAM - LEFT EYE: OS_EXAM: 1+ BROW PTOSIS

## 2025-07-03 ASSESSMENT — EXTERNAL EXAM - RIGHT EYE: OD_EXAM: 1+ BROW PTOSIS

## 2025-07-03 ASSESSMENT — CUP TO DISC RATIO
OS_RATIO: 0.3
OD_RATIO: 0.3

## 2025-07-03 NOTE — LETTER
7/3/2025      Maylin Ni  [address] [facility] [address]      Dear Colleague,    Thank you for referring your patient, Maylin Ni, to the [facility]. Please see a copy of my visit note below.    Chief Complaint   Patient presents with     Annual Eye Exam        Last Eye Exam: 06/2024  Dilated Previously: Yes, side effects of dilation explained today    What are you currently using to see?  glasses       Distance Vision Acuity: Satisfied with vision    Near Vision Acuity: Satisfied with vision while reading and using computer with glasses    Eye Comfort: good  Do you use eye drops? : No      Geovanna Mcclendon - Optometric Assistant       History of blepharoplasty  No fohx   Medical, surgical and family histories reviewed and updated 7/3/2025.       OBJECTIVE: See Ophthalmology exam    ASSESSMENT:    ICD-10-CM    1. Myopia of both eyes with astigmatism  H52.13     H52.203       2. Presbyopia  H52.4         Slight farsighted shift, less nearsighted /optional update  PLAN:     Hiwot Ward OD     Again, thank you for allowing me to participate in the care of your patient.        Sincerely,        Hiwot Ward, OD    Electronically signed

## 2025-07-03 NOTE — PROGRESS NOTES
Chief Complaint   Patient presents with    Annual Eye Exam        Last Eye Exam: 06/2024  Dilated Previously: Yes, side effects of dilation explained today    What are you currently using to see?  glasses       Distance Vision Acuity: Satisfied with vision    Near Vision Acuity: Satisfied with vision while reading and using computer with glasses    Eye Comfort: good  Do you use eye drops? : No      Geovanna Mcclendon - Optometric Assistant       History of blepharoplasty  No fohx   Medical, surgical and family histories reviewed and updated 7/3/2025.       OBJECTIVE: See Ophthalmology exam    ASSESSMENT:    ICD-10-CM    1. Myopia of both eyes with astigmatism  H52.13     H52.203       2. Presbyopia  H52.4         Slight farsighted shift, less nearsighted /optional update  PLAN:     Hiwot Ward OD

## 2025-07-29 ENCOUNTER — TRANSFERRED RECORDS (OUTPATIENT)
Dept: ADMINISTRATIVE | Facility: CLINIC | Age: 65
End: 2025-07-29
Payer: COMMERCIAL

## 2025-07-31 PROBLEM — D12.6 ADENOMATOUS POLYP OF COLON: Status: ACTIVE | Noted: 2025-07-31

## 2025-07-31 NOTE — PROCEDURES
[facility]   [address] [place] [address], [address], [place] [address]     Patient Name: Maylin Ni  Gender:  Female  Exam Date: 07/29/2025 Visit Number:  [id number]  Age: 65 Years YOB: 1960  Attending MD: Tova Peterson MD Medical Record#:  [id number]    Procedure: Colonoscopy   Indications: Previous adenomatous polyp(s)       Family history   of colon cancer  in patient's mother. Age diagnosed:  60 and older.  Referring MD: Referral Self  Primary MD:      Jessica Medina MD  Medications: Admitting Medications:   0.9% Normal Saline at [facility]   Intra Procedure Medications:   Patient received monitored anesthesia care.     Complications: No immediate complications  ______________________________________________________________________________  Procedure:   An examination of the heart and lungs was performed and found to be within acceptable limits.  .  The patient was therefore deemed a reasonable candidate for endoscopy and sedation.   The risks and benefits of the procedure were explained to the patient.After obtaining informed consent, the patient received monitored anesthesia care and I passed the scope   without difficulty via the rectum to the cecum.  The appendiceal orifice and ic valve were identified.  The scope was retroflexed during the examination  The quality of the prep was good  (Miralax/Gatorade/2 tablets Bisacodyl/Magnesium Citrate).    This was a complete examination throughout the entire colon.    Findings:    Polyp location: transverse colon.  Quantity: 1.  Size: 1 mm.  Polyp shape:  sessile.         Maneuver: polypectomy was performed with a cold biopsy forceps.       Removal:  complete.  Retrieval: complete.  Bleeding: none.  Anal canal:  internal hemorrhoid(s)  Remainder of the exam is normal.    Impression:  Personal history of adenomatous and serrated colon polyps  Hemorrhoids, internal    Preliminary Plan:  Repeat colonoscopy in 5 years for polyp  surveillance  Pathology Results:  A: COLON, TRANSVERSE, POLYP:           1. Tubular adenoma           2. Negative for high grade dysplasia           3. Per the colonoscopy report:               a. Polyp size: 1 mm               b. Resection: Complete               c. Retrieval: Complete      MICROSCOPIC  A: Performed     Electronically signed by: Romario Marino MD    Interpreted at [facility], [address] [place] [id number]    Orders    Diagnostics:  Procedure Comments Timeframe Assessment   Colonoscopy  5 Years Z12.11     Instruction(s)/Education:  Instruction/Education Timeframe Assessment   Colon Cancer Prevention  Z86.0101   Hemorrhoids (Internal)  Z86.0101       Final Plan:  Repeat colonoscopy in 5 years for Polyp surveillance.    We will attempt to contact you at appropriate intervals via [place] mail.  We may not be able to find you or contact you at that time, therefore you should know that the responsibility for following our recommendation rests with you.  If you don't hear from us at the time your procedure is due, please contact our office to schedule an appointment.  If your contact information should change, please contact our office so that we can update your record.      _Electronically signed by:___________________  Tova Peterson MD                 07/29/2025    cc: Jessica Medina MD  cc: Jessica Medina MD

## 2025-08-12 ENCOUNTER — MYC MEDICAL ADVICE (OUTPATIENT)
Dept: PEDIATRICS | Facility: CLINIC | Age: 65
End: 2025-08-12
Payer: COMMERCIAL

## (undated) DEVICE — SOL WATER IRRIG 500ML BOTTLE 2F7113

## (undated) DEVICE — PACK MINOR EYE CUSTOM ASC

## (undated) DEVICE — EYE PREP BETADINE 5% SOLUTION 30ML 0065-0411-30

## (undated) DEVICE — GLOVE BIOGEL PI MICRO SZ 7.5 48575

## (undated) DEVICE — ESU EYE HIGH TEMP 65410-183

## (undated) DEVICE — LINEN TOWEL PACK X5 5464

## (undated) RX ORDER — FENTANYL CITRATE 50 UG/ML
INJECTION, SOLUTION INTRAMUSCULAR; INTRAVENOUS
Status: DISPENSED
Start: 2023-06-21

## (undated) RX ORDER — PROPOFOL 10 MG/ML
INJECTION, EMULSION INTRAVENOUS
Status: DISPENSED
Start: 2023-06-21

## (undated) RX ORDER — ACETAMINOPHEN 325 MG/1
TABLET ORAL
Status: DISPENSED
Start: 2023-06-21

## (undated) RX ORDER — ONDANSETRON 4 MG/1
TABLET, ORALLY DISINTEGRATING ORAL
Status: DISPENSED
Start: 2023-06-21